# Patient Record
Sex: MALE | Race: WHITE | Employment: FULL TIME | URBAN - METROPOLITAN AREA
[De-identification: names, ages, dates, MRNs, and addresses within clinical notes are randomized per-mention and may not be internally consistent; named-entity substitution may affect disease eponyms.]

---

## 2017-01-25 DIAGNOSIS — E11.40 DIABETIC NEUROPATHY WITH NEUROLOGIC COMPLICATION (HCC): ICD-10-CM

## 2017-01-25 DIAGNOSIS — Z79.4 TYPE 2 DIABETES MELLITUS WITH DIABETIC POLYNEUROPATHY, WITH LONG-TERM CURRENT USE OF INSULIN (HCC): ICD-10-CM

## 2017-01-25 DIAGNOSIS — E11.42 TYPE 2 DIABETES MELLITUS WITH DIABETIC POLYNEUROPATHY, WITH LONG-TERM CURRENT USE OF INSULIN (HCC): ICD-10-CM

## 2017-01-25 DIAGNOSIS — E11.49 DIABETIC NEUROPATHY WITH NEUROLOGIC COMPLICATION (HCC): ICD-10-CM

## 2017-01-29 DIAGNOSIS — E11.40 DIABETIC NEUROPATHY WITH NEUROLOGIC COMPLICATION (HCC): ICD-10-CM

## 2017-01-29 DIAGNOSIS — E11.49 DIABETIC NEUROPATHY WITH NEUROLOGIC COMPLICATION (HCC): ICD-10-CM

## 2017-03-17 ENCOUNTER — PATIENT MESSAGE (OUTPATIENT)
Dept: FAMILY MEDICINE CLINIC | Age: 67
End: 2017-03-17

## 2017-03-17 DIAGNOSIS — I10 ESSENTIAL HYPERTENSION: ICD-10-CM

## 2017-03-17 DIAGNOSIS — E11.49 DIABETIC NEUROPATHY WITH NEUROLOGIC COMPLICATION (HCC): Primary | ICD-10-CM

## 2017-03-17 DIAGNOSIS — Z11.59 NEED FOR HEPATITIS C SCREENING TEST: ICD-10-CM

## 2017-03-17 DIAGNOSIS — E11.40 DIABETIC NEUROPATHY WITH NEUROLOGIC COMPLICATION (HCC): ICD-10-CM

## 2017-03-17 DIAGNOSIS — E11.42 TYPE 2 DIABETES MELLITUS WITH DIABETIC POLYNEUROPATHY, WITH LONG-TERM CURRENT USE OF INSULIN (HCC): ICD-10-CM

## 2017-03-17 DIAGNOSIS — G63 POLYNEUROPATHY ASSOCIATED WITH UNDERLYING DISEASE (HCC): ICD-10-CM

## 2017-03-17 DIAGNOSIS — Z79.4 TYPE 2 DIABETES MELLITUS WITH DIABETIC POLYNEUROPATHY, WITH LONG-TERM CURRENT USE OF INSULIN (HCC): ICD-10-CM

## 2017-03-17 DIAGNOSIS — E11.40 DIABETIC NEUROPATHY WITH NEUROLOGIC COMPLICATION (HCC): Primary | ICD-10-CM

## 2017-03-17 DIAGNOSIS — E11.49 DIABETIC NEUROPATHY WITH NEUROLOGIC COMPLICATION (HCC): ICD-10-CM

## 2017-03-17 DIAGNOSIS — E78.2 MIXED HYPERLIPIDEMIA: ICD-10-CM

## 2017-03-21 RX ORDER — DULOXETIN HYDROCHLORIDE 60 MG/1
CAPSULE, DELAYED RELEASE ORAL
Qty: 90 CAPSULE | Refills: 3 | Status: SHIPPED | OUTPATIENT
Start: 2017-03-21 | End: 2018-03-15 | Stop reason: SDUPTHER

## 2017-03-21 RX ORDER — PREGABALIN 100 MG/1
100 CAPSULE ORAL 3 TIMES DAILY
Qty: 270 CAPSULE | Refills: 1 | Status: SHIPPED | OUTPATIENT
Start: 2017-03-21 | End: 2017-09-23 | Stop reason: SDUPTHER

## 2017-03-22 ENCOUNTER — TELEPHONE (OUTPATIENT)
Dept: FAMILY MEDICINE CLINIC | Age: 67
End: 2017-03-22

## 2017-03-23 ENCOUNTER — TELEPHONE (OUTPATIENT)
Dept: FAMILY MEDICINE CLINIC | Age: 67
End: 2017-03-23

## 2017-03-23 DIAGNOSIS — E11.49 DIABETIC NEUROPATHY WITH NEUROLOGIC COMPLICATION (HCC): Primary | ICD-10-CM

## 2017-03-23 DIAGNOSIS — E11.40 DIABETIC NEUROPATHY WITH NEUROLOGIC COMPLICATION (HCC): Primary | ICD-10-CM

## 2017-03-25 RX ORDER — LANCETS 30 GAUGE
EACH MISCELLANEOUS
Qty: 100 EACH | Refills: 3 | Status: SHIPPED | OUTPATIENT
Start: 2017-03-25 | End: 2018-01-11

## 2017-03-25 RX ORDER — BLOOD-GLUCOSE METER
1 KIT MISCELLANEOUS DAILY
Qty: 1 KIT | Refills: 0 | Status: SHIPPED | OUTPATIENT
Start: 2017-03-25 | End: 2020-07-07 | Stop reason: SDUPTHER

## 2017-04-14 ENCOUNTER — HOSPITAL ENCOUNTER (OUTPATIENT)
Dept: LAB | Age: 67
Discharge: HOME | End: 2017-04-14
Payer: MEDICARE

## 2017-04-14 DIAGNOSIS — Z11.59 NEED FOR HEPATITIS C SCREENING TEST: ICD-10-CM

## 2017-04-14 DIAGNOSIS — E78.2 MIXED HYPERLIPIDEMIA: ICD-10-CM

## 2017-04-14 DIAGNOSIS — I10 ESSENTIAL HYPERTENSION: ICD-10-CM

## 2017-04-14 DIAGNOSIS — E11.40 DIABETIC NEUROPATHY WITH NEUROLOGIC COMPLICATION (HCC): ICD-10-CM

## 2017-04-14 DIAGNOSIS — E11.49 DIABETIC NEUROPATHY WITH NEUROLOGIC COMPLICATION (HCC): ICD-10-CM

## 2017-04-14 LAB
ACANTHOCYTES: 0
ALBUMIN SERPL-MCNC: 3.8 G/DL (ref 3.9–4.9)
ALP BLD-CCNC: 64 U/L (ref 35–104)
ALT SERPL-CCNC: 19 U/L (ref 0–41)
ANION GAP SERPL CALCULATED.3IONS-SCNC: 5 MEQ/L (ref 7–13)
ANISOCYTOSIS: 0
AST SERPL-CCNC: 18 U/L (ref 0–40)
AUER RODS: 0
BASOPHILIC STIPPLING: 0
BASOPHILS ABSOLUTE: 0.1 K/UL (ref 0–0.2)
BASOPHILS RELATIVE PERCENT: 3 %
BILIRUB SERPL-MCNC: 0.2 MG/DL (ref 0–1.2)
BUN BLDV-MCNC: 17 MG/DL (ref 8–23)
BURR CELLS: 0
CABOT RINGS: 0
CALCIUM SERPL-MCNC: 9 MG/DL (ref 8.6–10.2)
CHLORIDE BLD-SCNC: 105 MEQ/L (ref 98–107)
CHOLESTEROL, TOTAL: 105 MG/DL (ref 0–199)
CO2: 28 MEQ/L (ref 22–29)
CREAT SERPL-MCNC: 0.84 MG/DL (ref 0.7–1.2)
CREATININE URINE: 131.9 MG/DL
DOHLE BODIES: 0
EOSINOPHILS ABSOLUTE: 0.1 K/UL (ref 0–0.7)
EOSINOPHILS RELATIVE PERCENT: 4 %
GFR AFRICAN AMERICAN: >60
GFR NON-AFRICAN AMERICAN: >60
GLOBULIN: 2.2 G/DL (ref 2.3–3.5)
GLUCOSE BLD-MCNC: 117 MG/DL (ref 74–109)
HAIRY CELLS: 0
HBA1C MFR BLD: 7.7 % (ref 4.8–5.9)
HCT VFR BLD CALC: 35.9 % (ref 42–52)
HDLC SERPL-MCNC: 52 MG/DL (ref 40–59)
HEMOGLOBIN: 12.3 G/DL (ref 14–18)
HEPATITIS C ANTIBODY INTERPRETATION: NORMAL
HOWELL-JOLLY BODIES: 0
HYPERSEGMENTED NEUTROPHILS: 0
HYPOCHROMIA: 0
LDL CHOLESTEROL CALCULATED: 40 MG/DL (ref 0–129)
LYMPHOCYTES ABSOLUTE: 1 K/UL (ref 1–4.8)
LYMPHOCYTES RELATIVE PERCENT: 32 %
MACROCYTES: 0
MCH RBC QN AUTO: 30.9 PG (ref 27–31.3)
MCHC RBC AUTO-ENTMCNC: 34.3 % (ref 33–37)
MCV RBC AUTO: 90.1 FL (ref 80–100)
MICROALBUMIN UR-MCNC: 9 MG/DL
MICROALBUMIN/CREAT UR-RTO: 68.2 MG/G (ref 0–30)
MICROCYTES: 0
MONOCYTES ABSOLUTE: 0.5 K/UL (ref 0.2–0.8)
MONOCYTES RELATIVE PERCENT: 17.1 %
NEUTROPHILS ABSOLUTE: 1.4 K/UL (ref 1.4–6.5)
NEUTROPHILS RELATIVE PERCENT: 44 %
OVALOCYTES: 0
PAPPENHEIMER BODIES: 0
PDW BLD-RTO: 12.8 % (ref 11.5–14.5)
PELGER HUET CELLS: 0 %
PLATELET # BLD: 107 K/UL (ref 130–400)
PLATELET SLIDE REVIEW: ABNORMAL
POIKILOCYTES: 0
POLYCHROMASIA: 0
POTASSIUM SERPL-SCNC: 4.7 MEQ/L (ref 3.5–5.1)
RBC # BLD: 3.98 M/UL (ref 4.7–6.1)
RBC # BLD: NORMAL 10*6/UL
SCHISTOCYTES: 0
SICKLE CELLS: 0
SODIUM BLD-SCNC: 138 MEQ/L (ref 132–144)
SPHEROCYTES: 0
STOMATOCYTES: 0
TARGET CELLS: 0
TEAR DROP CELLS: 0
TOTAL PROTEIN: 6 G/DL (ref 6.4–8.1)
TOXIC GRANULATION: 0
TRIGL SERPL-MCNC: 65 MG/DL (ref 0–200)
TSH SERPL DL<=0.05 MIU/L-ACNC: 3.46 UIU/ML (ref 0.27–4.2)
VACUOLATED NEUTROPHILS: 0
WBC # BLD: 3.1 K/UL (ref 4.8–10.8)

## 2017-04-14 PROCEDURE — 82043 UR ALBUMIN QUANTITATIVE: CPT

## 2017-04-14 PROCEDURE — 84443 ASSAY THYROID STIM HORMONE: CPT

## 2017-04-14 PROCEDURE — 36415 COLL VENOUS BLD VENIPUNCTURE: CPT

## 2017-04-14 PROCEDURE — 85025 COMPLETE CBC W/AUTO DIFF WBC: CPT

## 2017-04-14 PROCEDURE — 83036 HEMOGLOBIN GLYCOSYLATED A1C: CPT

## 2017-04-14 PROCEDURE — 80061 LIPID PANEL: CPT

## 2017-04-14 PROCEDURE — 86803 HEPATITIS C AB TEST: CPT

## 2017-04-14 PROCEDURE — 80053 COMPREHEN METABOLIC PANEL: CPT

## 2017-04-14 PROCEDURE — 82570 ASSAY OF URINE CREATININE: CPT

## 2017-06-15 ENCOUNTER — TELEPHONE (OUTPATIENT)
Dept: ADMINISTRATIVE | Age: 67
End: 2017-06-15

## 2017-06-27 ENCOUNTER — TELEPHONE (OUTPATIENT)
Dept: PHARMACY | Facility: CLINIC | Age: 67
End: 2017-06-27

## 2017-10-31 RX ORDER — DIAPER,BRIEF,ADULT, DISPOSABLE
EACH MISCELLANEOUS
Qty: 200 EACH | Refills: 5 | Status: SHIPPED | OUTPATIENT
Start: 2017-10-31 | End: 2018-01-11 | Stop reason: SDUPTHER

## 2017-11-14 ENCOUNTER — OFFICE VISIT (OUTPATIENT)
Dept: FAMILY MEDICINE CLINIC | Age: 67
End: 2017-11-14

## 2017-11-14 VITALS
WEIGHT: 250 LBS | BODY MASS INDEX: 33.86 KG/M2 | HEIGHT: 72 IN | TEMPERATURE: 97.5 F | RESPIRATION RATE: 12 BRPM | SYSTOLIC BLOOD PRESSURE: 104 MMHG | DIASTOLIC BLOOD PRESSURE: 58 MMHG | HEART RATE: 54 BPM

## 2017-11-14 DIAGNOSIS — Z79.4 TYPE 2 DIABETES MELLITUS WITH DIABETIC POLYNEUROPATHY, WITH LONG-TERM CURRENT USE OF INSULIN (HCC): Primary | ICD-10-CM

## 2017-11-14 DIAGNOSIS — E78.2 MIXED HYPERLIPIDEMIA: ICD-10-CM

## 2017-11-14 DIAGNOSIS — I10 ESSENTIAL HYPERTENSION: ICD-10-CM

## 2017-11-14 DIAGNOSIS — Z23 NEED FOR INFLUENZA VACCINATION: ICD-10-CM

## 2017-11-14 DIAGNOSIS — N52.8 OTHER MALE ERECTILE DYSFUNCTION: ICD-10-CM

## 2017-11-14 DIAGNOSIS — Z23 NEED FOR 23-POLYVALENT PNEUMOCOCCAL POLYSACCHARIDE VACCINE: ICD-10-CM

## 2017-11-14 DIAGNOSIS — M25.522 LEFT ELBOW PAIN: ICD-10-CM

## 2017-11-14 DIAGNOSIS — E11.42 TYPE 2 DIABETES MELLITUS WITH DIABETIC POLYNEUROPATHY, WITH LONG-TERM CURRENT USE OF INSULIN (HCC): Primary | ICD-10-CM

## 2017-11-14 PROCEDURE — 90732 PPSV23 VACC 2 YRS+ SUBQ/IM: CPT | Performed by: FAMILY MEDICINE

## 2017-11-14 PROCEDURE — 90662 IIV NO PRSV INCREASED AG IM: CPT | Performed by: FAMILY MEDICINE

## 2017-11-14 PROCEDURE — G0008 ADMIN INFLUENZA VIRUS VAC: HCPCS | Performed by: FAMILY MEDICINE

## 2017-11-14 PROCEDURE — 99214 OFFICE O/P EST MOD 30 MIN: CPT | Performed by: FAMILY MEDICINE

## 2017-11-14 PROCEDURE — G0009 ADMIN PNEUMOCOCCAL VACCINE: HCPCS | Performed by: FAMILY MEDICINE

## 2017-11-14 RX ORDER — TADALAFIL 5 MG/1
5 TABLET ORAL DAILY
Qty: 90 TABLET | Refills: 3 | Status: SHIPPED | OUTPATIENT
Start: 2017-11-14 | End: 2018-09-20 | Stop reason: SDUPTHER

## 2017-11-14 RX ORDER — CELECOXIB 200 MG/1
200 CAPSULE ORAL DAILY
Qty: 90 CAPSULE | Refills: 3 | Status: SHIPPED | OUTPATIENT
Start: 2017-11-14 | End: 2018-09-20 | Stop reason: SDUPTHER

## 2017-11-14 RX ORDER — EZETIMIBE 10 MG/1
10 TABLET ORAL DAILY
COMMUNITY
End: 2017-11-14 | Stop reason: ALTCHOICE

## 2017-11-14 ASSESSMENT — ENCOUNTER SYMPTOMS
TROUBLE SWALLOWING: 0
CONSTIPATION: 0
CHEST TIGHTNESS: 0
NAUSEA: 0
SHORTNESS OF BREATH: 0
BLOOD IN STOOL: 0
DIARRHEA: 0
COUGH: 0
VOMITING: 0
ABDOMINAL PAIN: 0

## 2017-11-14 ASSESSMENT — PATIENT HEALTH QUESTIONNAIRE - PHQ9
1. LITTLE INTEREST OR PLEASURE IN DOING THINGS: 0
2. FEELING DOWN, DEPRESSED OR HOPELESS: 0
SUM OF ALL RESPONSES TO PHQ9 QUESTIONS 1 & 2: 0
SUM OF ALL RESPONSES TO PHQ QUESTIONS 1-9: 0

## 2017-11-14 NOTE — PROGRESS NOTES
Diabetes Mellitus Type 2: Current symptoms/problems include neuropathy. Home blood sugar records: patient tests 4 time(s) per day  Any episodes of hypoglycemia? Yes, occasionally. Known diabetic complications: none  Hypertension is well-controlled with current medicine and low-salt diet. Hyperlipidemia has been well controlled with current diet and medication. He has no side effects. Left elbow pain he has responded fairly well to the nonsteroidal anti-inflammatory. Erectile dysfunction responds well to C[place] Patient is due for influenza and Pneumovax vaccinations. Blood pressure less than 131/81,   BP Readings from Last 1 Encounters:   11/14/17 (!) 104/58     Social history: This pt is not a smoker. This pt does take an aspirin a day. Last eye exam was 01/2016. Last diabetic foot exam was TODAY. Lab results for chronic conditions:    GFR Non- (no units)   Date Value   04/14/2017 >60.0   09/23/2016 >60.0   03/17/2016 >60.0     No results found for: GFR  Cholesterol, Total (mg/dL)   Date Value   04/14/2017 105   09/23/2016 119   03/17/2016 119     Triglycerides (mg/dL)   Date Value   04/14/2017 65   09/23/2016 140   03/17/2016 126     HDL (mg/dL)   Date Value   04/14/2017 52   09/23/2016 48   03/17/2016 47     LDL Calculated (mg/dL)   Date Value   04/14/2017 40   09/23/2016 43   03/17/2016 47     TSH (uIU/mL)   Date Value   04/14/2017 3.460   02/20/2012 3.525     Hemoglobin A1C (%)   Date Value   04/14/2017 7.7 (H)   09/23/2016 7.2 (H)   03/17/2016 8.0 (H)     MICROALBUMIN, RANDOM URINE (mg/dL)   Date Value   04/14/2017 9.00 (H)     Review of Systems   HENT: Negative for congestion and trouble swallowing. Respiratory: Negative for cough, chest tightness and shortness of breath. Cardiovascular: Negative for chest pain, palpitations and leg swelling.    Gastrointestinal: Negative for abdominal pain, blood in stool, constipation, diarrhea, nausea and vomiting. Endocrine: Negative for cold intolerance and heat intolerance. Neurological: Negative for dizziness and light-headedness. Psychiatric/Behavioral: Negative for confusion. The patient is not nervous/anxious. Diabetes Counseling   Patient was counseled regarding disease risks and adopting healthy behaviors. Patient was provided education materials to assist with self management. Patient was provided log (or received log during previous visit) to record blood pressure, food intake and/or blood sugar. Patient was instructed to keep log up-to-date and to always bring log to all office visits. Insulin R and N 30 units in the morning and 40 of each at supper. EXAM:  Constitutional Blood pressure (!) 104/58, pulse 54, temperature 97.5 °F (36.4 °C), temperature source Temporal, resp. rate 12, height 5' 11.5\" (1.816 m), weight 250 lb (113.4 kg). .   Physical Exam   Constitutional: He appears well-developed and well-nourished. HENT:   Head: Normocephalic. Eyes: Pupils are equal, round, and reactive to light. Neck: Normal range of motion. Cardiovascular: Normal rate and regular rhythm. Pulmonary/Chest: Effort normal and breath sounds normal.   Abdominal: Soft. This patient is obese. Body mass index is 34.38 kg/m². Musculoskeletal:   There is no costovertebral angle tenderness. Lumbar spine and sacroiliac joints are non tender. Dorsalis pedis and posterior tibial pulses are symmetric. No fissures between the toes. No open sores on the feet. Sensation intact to monofilament testing in 8 of 8 areas tested. No edema in feet. Radial pulses strong and symmetric. No edema in hands or wrists. Skin: Skin is warm and dry. Psychiatric: He has a normal mood and affect. His behavior is normal.       DIAGNOSIS:   1.  Type 2 diabetes mellitus with diabetic polyneuropathy, with long-term current use of insulin (HCC)  Hemoglobin A1C     DIABETES FOOT EXAM    insulin

## 2017-11-16 DIAGNOSIS — E11.42 TYPE 2 DIABETES MELLITUS WITH DIABETIC POLYNEUROPATHY, WITH LONG-TERM CURRENT USE OF INSULIN (HCC): ICD-10-CM

## 2017-11-16 DIAGNOSIS — Z79.4 TYPE 2 DIABETES MELLITUS WITH DIABETIC POLYNEUROPATHY, WITH LONG-TERM CURRENT USE OF INSULIN (HCC): ICD-10-CM

## 2017-12-27 DIAGNOSIS — G63 POLYNEUROPATHY ASSOCIATED WITH UNDERLYING DISEASE (HCC): ICD-10-CM

## 2017-12-27 RX ORDER — PREGABALIN 100 MG/1
100 CAPSULE ORAL 3 TIMES DAILY
Qty: 270 CAPSULE | Refills: 0 | Status: SHIPPED | OUTPATIENT
Start: 2017-12-27 | End: 2018-03-15 | Stop reason: SDUPTHER

## 2017-12-27 NOTE — TELEPHONE ENCOUNTER
From: Gabriela Walker  Sent: 12/27/2017 9:22 AM EST  Subject: Medication Renewal Request    Isaías Flores would like a refill of the following medications:  LYRICA 100 MG capsule Alexys Robles MD]    Preferred pharmacy: [place] [address] [phone] - F [phone]    Comment:  Could you please send this to Luis E[facility] so I may get this filled under this years prescription plan.  Thank you, happy holidays, Isaías Olivera

## 2017-12-27 NOTE — TELEPHONE ENCOUNTER
Pharmacy requests refill on medication. Please approve or deny this request.  Last seen by you on 11/14/2017. LAST FILLED 9/24/2017    No future appointments.

## 2018-01-11 ENCOUNTER — TELEPHONE (OUTPATIENT)
Dept: FAMILY MEDICINE CLINIC | Age: 68
End: 2018-01-11

## 2018-01-11 DIAGNOSIS — E11.42 TYPE 2 DIABETES MELLITUS WITH DIABETIC POLYNEUROPATHY, WITH LONG-TERM CURRENT USE OF INSULIN (HCC): Primary | ICD-10-CM

## 2018-01-11 DIAGNOSIS — Z79.4 TYPE 2 DIABETES MELLITUS WITH DIABETIC POLYNEUROPATHY, WITH LONG-TERM CURRENT USE OF INSULIN (HCC): Primary | ICD-10-CM

## 2018-01-11 RX ORDER — LANCETS 30 GAUGE
EACH MISCELLANEOUS
Qty: 200 EACH | Refills: 5 | Status: SHIPPED | OUTPATIENT
Start: 2018-01-11

## 2018-01-11 NOTE — TELEPHONE ENCOUNTER
From: Elvin Lidas  Sent: 1/11/2018 11:19 AM EST  Subject: Medication Renewal Request    Colton May CARMELITA Diaz would like a refill of the following medications:  Kareem Salazar TEST strip Marilyn Hartman MD]    Preferred pharmacy: [facility] [id number] , [address]    Comment:  One Touch Meters & Test Strips Dr Luh Prince, I need a prescription for 2 meters, one for work and one for home as the insurance company will not fill my TrueTrack. They will only fill OneTouch and I would like 2 OneTouch Verio IQ Meters and test strips to work with those meters, 5 per day [employer] Strips. Thank You Huy Mcmahan [address] 14-

## 2018-01-11 NOTE — TELEPHONE ENCOUNTER
[facility] called as they received the RX for test strips. They also need an order for a monitor and lancets. Order pended. For 5 times daily testing the RX's will require a prior authorization. Do you want a PA done or change number of testing times per day?

## 2018-01-11 NOTE — TELEPHONE ENCOUNTER
PHARMACY requesting refill please approve or deny    Last OV 11-14-17    Next Visit Date:  No future appointments.

## 2018-02-21 ENCOUNTER — TELEPHONE (OUTPATIENT)
Dept: FAMILY MEDICINE CLINIC | Age: 68
End: 2018-02-21

## 2018-02-21 DIAGNOSIS — E11.42 TYPE 2 DIABETES MELLITUS WITH DIABETIC POLYNEUROPATHY, WITH LONG-TERM CURRENT USE OF INSULIN (HCC): ICD-10-CM

## 2018-02-21 DIAGNOSIS — Z79.4 TYPE 2 DIABETES MELLITUS WITH DIABETIC POLYNEUROPATHY, WITH LONG-TERM CURRENT USE OF INSULIN (HCC): ICD-10-CM

## 2018-03-13 ENCOUNTER — HOSPITAL ENCOUNTER (OUTPATIENT)
Dept: LAB | Age: 68
Discharge: HOME | End: 2018-03-13
Payer: MEDICARE

## 2018-03-13 DIAGNOSIS — Z79.4 TYPE 2 DIABETES MELLITUS WITH DIABETIC POLYNEUROPATHY, WITH LONG-TERM CURRENT USE OF INSULIN (HCC): ICD-10-CM

## 2018-03-13 DIAGNOSIS — E11.42 TYPE 2 DIABETES MELLITUS WITH DIABETIC POLYNEUROPATHY, WITH LONG-TERM CURRENT USE OF INSULIN (HCC): ICD-10-CM

## 2018-03-13 DIAGNOSIS — E78.2 MIXED HYPERLIPIDEMIA: ICD-10-CM

## 2018-03-13 DIAGNOSIS — I10 ESSENTIAL HYPERTENSION: ICD-10-CM

## 2018-03-13 LAB
ALBUMIN SERPL-MCNC: 4.1 G/DL (ref 3.9–4.9)
ALP BLD-CCNC: 75 U/L (ref 35–104)
ALT SERPL-CCNC: 23 U/L (ref 0–41)
ANION GAP SERPL CALCULATED.3IONS-SCNC: 10 MEQ/L (ref 7–13)
AST SERPL-CCNC: 18 U/L (ref 0–40)
BASOPHILS ABSOLUTE: 0 K/UL (ref 0–0.2)
BASOPHILS RELATIVE PERCENT: 1.1 %
BILIRUB SERPL-MCNC: 0.3 MG/DL (ref 0–1.2)
BUN BLDV-MCNC: 14 MG/DL (ref 8–23)
CALCIUM SERPL-MCNC: 9 MG/DL (ref 8.6–10.2)
CHLORIDE BLD-SCNC: 101 MEQ/L (ref 98–107)
CHOLESTEROL, TOTAL: 179 MG/DL (ref 0–199)
CO2: 28 MEQ/L (ref 22–29)
CREAT SERPL-MCNC: 0.84 MG/DL (ref 0.7–1.2)
EOSINOPHILS ABSOLUTE: 0.2 K/UL (ref 0–0.7)
EOSINOPHILS RELATIVE PERCENT: 3.9 %
GFR AFRICAN AMERICAN: >60
GFR NON-AFRICAN AMERICAN: >60
GLOBULIN: 2.4 G/DL (ref 2.3–3.5)
GLUCOSE BLD-MCNC: 159 MG/DL (ref 74–109)
HBA1C MFR BLD: 8.7 % (ref 4.8–5.9)
HCT VFR BLD CALC: 45.7 % (ref 42–52)
HDLC SERPL-MCNC: 48 MG/DL (ref 40–59)
HEMOGLOBIN: 15 G/DL (ref 14–18)
LDL CHOLESTEROL CALCULATED: 93 MG/DL (ref 0–129)
LYMPHOCYTES ABSOLUTE: 1.1 K/UL (ref 1–4.8)
LYMPHOCYTES RELATIVE PERCENT: 29.1 %
MCH RBC QN AUTO: 30.9 PG (ref 27–31.3)
MCHC RBC AUTO-ENTMCNC: 32.8 % (ref 33–37)
MCV RBC AUTO: 94.2 FL (ref 80–100)
MONOCYTES ABSOLUTE: 0.4 K/UL (ref 0.2–0.8)
MONOCYTES RELATIVE PERCENT: 9.1 %
NEUTROPHILS ABSOLUTE: 2.2 K/UL (ref 1.4–6.5)
NEUTROPHILS RELATIVE PERCENT: 56.8 %
PDW BLD-RTO: 14.9 % (ref 11.5–14.5)
PLATELET # BLD: 128 K/UL (ref 130–400)
POTASSIUM SERPL-SCNC: 4.4 MEQ/L (ref 3.5–5.1)
RBC # BLD: 4.85 M/UL (ref 4.7–6.1)
SLIDE REVIEW: ABNORMAL
SODIUM BLD-SCNC: 139 MEQ/L (ref 132–144)
TOTAL PROTEIN: 6.5 G/DL (ref 6.4–8.1)
TRIGL SERPL-MCNC: 189 MG/DL (ref 0–200)
WBC # BLD: 3.9 K/UL (ref 4.8–10.8)

## 2018-03-13 PROCEDURE — 80061 LIPID PANEL: CPT

## 2018-03-13 PROCEDURE — 85025 COMPLETE CBC W/AUTO DIFF WBC: CPT

## 2018-03-13 PROCEDURE — 36415 COLL VENOUS BLD VENIPUNCTURE: CPT

## 2018-03-13 PROCEDURE — 80053 COMPREHEN METABOLIC PANEL: CPT

## 2018-03-13 PROCEDURE — 83036 HEMOGLOBIN GLYCOSYLATED A1C: CPT

## 2018-03-15 DIAGNOSIS — G63 POLYNEUROPATHY ASSOCIATED WITH UNDERLYING DISEASE (HCC): ICD-10-CM

## 2018-03-15 RX ORDER — DULOXETIN HYDROCHLORIDE 60 MG/1
CAPSULE, DELAYED RELEASE ORAL
Qty: 90 CAPSULE | Refills: 3 | Status: SHIPPED | OUTPATIENT
Start: 2018-03-15 | End: 2018-09-20 | Stop reason: SDUPTHER

## 2018-03-15 RX ORDER — PREGABALIN 100 MG/1
100 CAPSULE ORAL 3 TIMES DAILY
Qty: 270 CAPSULE | Refills: 0 | Status: SHIPPED | OUTPATIENT
Start: 2018-03-15 | End: 2018-06-20 | Stop reason: SDUPTHER

## 2018-03-15 NOTE — TELEPHONE ENCOUNTER
From: Eugene Harper  Sent: 3/15/2018 10:46 AM EDT  Subject: Medication Renewal Request    Peter Price. Jasmintyolr Navarro would like a refill of the following medications:     DULoxetine (CYMBALTA) 60 MG extended release capsule Meet Osman MD]     pregabalin (LYRICA) 100 MG capsule Meet Osman MD]    Preferred pharmacy: [facility]    Comment:  Please send to [facility].  Thanks, Dave Gar

## 2018-06-20 ENCOUNTER — OFFICE VISIT (OUTPATIENT)
Dept: FAMILY MEDICINE CLINIC | Age: 68
End: 2018-06-20
Payer: MEDICARE

## 2018-06-20 VITALS
DIASTOLIC BLOOD PRESSURE: 62 MMHG | RESPIRATION RATE: 16 BRPM | WEIGHT: 249 LBS | BODY MASS INDEX: 33.72 KG/M2 | SYSTOLIC BLOOD PRESSURE: 104 MMHG | HEART RATE: 68 BPM | HEIGHT: 72 IN | TEMPERATURE: 98 F

## 2018-06-20 DIAGNOSIS — G63 POLYNEUROPATHY ASSOCIATED WITH UNDERLYING DISEASE (HCC): ICD-10-CM

## 2018-06-20 DIAGNOSIS — E11.42 TYPE 2 DIABETES MELLITUS WITH DIABETIC POLYNEUROPATHY, WITH LONG-TERM CURRENT USE OF INSULIN (HCC): ICD-10-CM

## 2018-06-20 DIAGNOSIS — Z79.4 TYPE 2 DIABETES MELLITUS WITH DIABETIC POLYNEUROPATHY, WITH LONG-TERM CURRENT USE OF INSULIN (HCC): ICD-10-CM

## 2018-06-20 DIAGNOSIS — E66.09 CLASS 1 OBESITY DUE TO EXCESS CALORIES WITH SERIOUS COMORBIDITY AND BODY MASS INDEX (BMI) OF 34.0 TO 34.9 IN ADULT: ICD-10-CM

## 2018-06-20 DIAGNOSIS — Z79.4 TYPE 2 DIABETES MELLITUS WITH DIABETIC POLYNEUROPATHY, WITH LONG-TERM CURRENT USE OF INSULIN (HCC): Primary | ICD-10-CM

## 2018-06-20 DIAGNOSIS — E11.42 TYPE 2 DIABETES MELLITUS WITH DIABETIC POLYNEUROPATHY, WITH LONG-TERM CURRENT USE OF INSULIN (HCC): Primary | ICD-10-CM

## 2018-06-20 PROBLEM — Z83.518 FAMILY HISTORY OF MACULAR DEGENERATION: Status: ACTIVE | Noted: 2018-02-21

## 2018-06-20 PROBLEM — H52.4 HYPEROPIA OF BOTH EYES WITH ASTIGMATISM AND PRESBYOPIA: Status: ACTIVE | Noted: 2017-01-06

## 2018-06-20 PROBLEM — H52.03 HYPEROPIA OF BOTH EYES WITH ASTIGMATISM AND PRESBYOPIA: Status: ACTIVE | Noted: 2017-01-06

## 2018-06-20 PROBLEM — H25.11 NUCLEAR SCLEROTIC CATARACT OF RIGHT EYE: Status: ACTIVE | Noted: 2017-01-06

## 2018-06-20 PROBLEM — H52.203 HYPEROPIA OF BOTH EYES WITH ASTIGMATISM AND PRESBYOPIA: Status: ACTIVE | Noted: 2017-01-06

## 2018-06-20 LAB
CREATININE URINE: 92.6 MG/DL
HBA1C MFR BLD: 7.3 %
MICROALBUMIN UR-MCNC: 5.1 MG/DL
MICROALBUMIN/CREAT UR-RTO: 55.1 MG/G (ref 0–30)

## 2018-06-20 PROCEDURE — 99214 OFFICE O/P EST MOD 30 MIN: CPT | Performed by: NURSE PRACTITIONER

## 2018-06-20 PROCEDURE — 83036 HEMOGLOBIN GLYCOSYLATED A1C: CPT | Performed by: NURSE PRACTITIONER

## 2018-06-20 RX ORDER — ICOSAPENT ETHYL 1000 MG/1
CAPSULE ORAL
COMMUNITY
Start: 2018-03-21 | End: 2019-03-21

## 2018-06-20 RX ORDER — EZETIMIBE 10 MG/1
10 TABLET ORAL DAILY
COMMUNITY

## 2018-06-20 RX ORDER — PREGABALIN 100 MG/1
100 CAPSULE ORAL 3 TIMES DAILY
Qty: 90 CAPSULE | Refills: 0 | Status: SHIPPED | OUTPATIENT
Start: 2018-06-20 | End: 2018-07-17 | Stop reason: SDUPTHER

## 2018-06-20 RX ORDER — METFORMIN HYDROCHLORIDE 500 MG/1
500 TABLET, EXTENDED RELEASE ORAL 2 TIMES DAILY
Qty: 60 TABLET | Refills: 3 | Status: SHIPPED | OUTPATIENT
Start: 2018-06-20 | End: 2018-09-20

## 2018-06-27 PROBLEM — G63 POLYNEUROPATHY ASSOCIATED WITH UNDERLYING DISEASE (HCC): Status: ACTIVE | Noted: 2018-06-27

## 2018-06-27 ASSESSMENT — ENCOUNTER SYMPTOMS
COUGH: 0
VISUAL CHANGE: 0
BLURRED VISION: 0
WHEEZING: 0
CHEST TIGHTNESS: 0
SHORTNESS OF BREATH: 0
BLOOD IN STOOL: 0

## 2018-07-17 DIAGNOSIS — G63 POLYNEUROPATHY ASSOCIATED WITH UNDERLYING DISEASE (HCC): ICD-10-CM

## 2018-07-17 NOTE — TELEPHONE ENCOUNTER
Pt requests refill on medication.  Please approve or deny this request.    LOV 11/14/17 by PADMA  6/20/18 for Check up with Sami Clarke    Last refill 6/20/18    Future Appointments  Date Time Provider Amado Aceves   9/20/2018 9:15 AM Navjot Chow MD [address] [facility]

## 2018-07-24 RX ORDER — PREGABALIN 100 MG/1
100 CAPSULE ORAL 3 TIMES DAILY
Qty: 90 CAPSULE | Refills: 2 | Status: SHIPPED | OUTPATIENT
Start: 2018-07-24 | End: 2018-09-20 | Stop reason: SDUPTHER

## 2018-09-13 RX ORDER — BLOOD SUGAR DIAGNOSTIC
STRIP MISCELLANEOUS
Qty: 200 STRIP | Refills: 5 | Status: SHIPPED | OUTPATIENT
Start: 2018-09-13 | End: 2019-03-21 | Stop reason: SDUPTHER

## 2018-09-13 NOTE — TELEPHONE ENCOUNTER
Pharmacy requesting refill. Medication pended for approval/denial. Thank you.     LOV  06/20/2018 with [place]    NEXT APPT:  Future Appointments  Date Time Provider Amado Aceves   9/20/2018 9:15 AM Eugenia Lozada MD [address]

## 2018-09-20 ENCOUNTER — OFFICE VISIT (OUTPATIENT)
Dept: FAMILY MEDICINE CLINIC | Age: 68
End: 2018-09-20
Payer: MEDICARE

## 2018-09-20 VITALS
RESPIRATION RATE: 14 BRPM | HEART RATE: 61 BPM | OXYGEN SATURATION: 96 % | HEIGHT: 72 IN | BODY MASS INDEX: 34.32 KG/M2 | DIASTOLIC BLOOD PRESSURE: 62 MMHG | WEIGHT: 253.4 LBS | TEMPERATURE: 97.5 F | SYSTOLIC BLOOD PRESSURE: 104 MMHG

## 2018-09-20 DIAGNOSIS — E78.2 MIXED HYPERLIPIDEMIA: ICD-10-CM

## 2018-09-20 DIAGNOSIS — G63 POLYNEUROPATHY ASSOCIATED WITH UNDERLYING DISEASE (HCC): ICD-10-CM

## 2018-09-20 DIAGNOSIS — Z12.5 SPECIAL SCREENING FOR MALIGNANT NEOPLASM OF PROSTATE: ICD-10-CM

## 2018-09-20 DIAGNOSIS — Z23 NEED FOR INFLUENZA VACCINATION: ICD-10-CM

## 2018-09-20 DIAGNOSIS — E11.42 TYPE 2 DIABETES MELLITUS WITH DIABETIC POLYNEUROPATHY, WITH LONG-TERM CURRENT USE OF INSULIN (HCC): ICD-10-CM

## 2018-09-20 DIAGNOSIS — I10 ESSENTIAL HYPERTENSION: ICD-10-CM

## 2018-09-20 DIAGNOSIS — Z79.4 TYPE 2 DIABETES MELLITUS WITH DIABETIC POLYNEUROPATHY, WITH LONG-TERM CURRENT USE OF INSULIN (HCC): Primary | ICD-10-CM

## 2018-09-20 DIAGNOSIS — D61.818 PANCYTOPENIA (HCC): ICD-10-CM

## 2018-09-20 DIAGNOSIS — Z79.4 TYPE 2 DIABETES MELLITUS WITH DIABETIC POLYNEUROPATHY, WITH LONG-TERM CURRENT USE OF INSULIN (HCC): ICD-10-CM

## 2018-09-20 DIAGNOSIS — E11.42 TYPE 2 DIABETES MELLITUS WITH DIABETIC POLYNEUROPATHY, WITH LONG-TERM CURRENT USE OF INSULIN (HCC): Primary | ICD-10-CM

## 2018-09-20 DIAGNOSIS — N52.8 OTHER MALE ERECTILE DYSFUNCTION: ICD-10-CM

## 2018-09-20 DIAGNOSIS — M25.522 LEFT ELBOW PAIN: ICD-10-CM

## 2018-09-20 PROBLEM — Z83.518 FAMILY HISTORY OF MACULAR DEGENERATION: Status: RESOLVED | Noted: 2018-02-21 | Resolved: 2018-09-20

## 2018-09-20 LAB
ALBUMIN SERPL-MCNC: 4.1 G/DL (ref 3.9–4.9)
ALP BLD-CCNC: 71 U/L (ref 35–104)
ALT SERPL-CCNC: 29 U/L (ref 0–41)
ANION GAP SERPL CALCULATED.3IONS-SCNC: 14 MEQ/L (ref 7–13)
AST SERPL-CCNC: 27 U/L (ref 0–40)
BASOPHILS ABSOLUTE: 0 K/UL (ref 0–0.2)
BASOPHILS RELATIVE PERCENT: 0.6 %
BILIRUB SERPL-MCNC: 0.5 MG/DL (ref 0–1.2)
BUN BLDV-MCNC: 17 MG/DL (ref 8–23)
CALCIUM SERPL-MCNC: 9 MG/DL (ref 8.6–10.2)
CHLORIDE BLD-SCNC: 101 MEQ/L (ref 98–107)
CHOLESTEROL, TOTAL: 111 MG/DL (ref 0–199)
CO2: 24 MEQ/L (ref 22–29)
CREAT SERPL-MCNC: 0.83 MG/DL (ref 0.7–1.2)
EOSINOPHILS ABSOLUTE: 0.2 K/UL (ref 0–0.7)
EOSINOPHILS RELATIVE PERCENT: 4.5 %
GFR AFRICAN AMERICAN: >60
GFR NON-AFRICAN AMERICAN: >60
GLOBULIN: 2.5 G/DL (ref 2.3–3.5)
GLUCOSE BLD-MCNC: 147 MG/DL (ref 74–109)
HBA1C MFR BLD: 8.4 % (ref 4.8–5.9)
HCT VFR BLD CALC: 42.2 % (ref 42–52)
HDLC SERPL-MCNC: 47 MG/DL (ref 40–59)
HEMOGLOBIN: 14.6 G/DL (ref 14–18)
LDL CHOLESTEROL CALCULATED: 44 MG/DL (ref 0–129)
LYMPHOCYTES ABSOLUTE: 1.1 K/UL (ref 1–4.8)
LYMPHOCYTES RELATIVE PERCENT: 27.5 %
MCH RBC QN AUTO: 33.1 PG (ref 27–31.3)
MCHC RBC AUTO-ENTMCNC: 34.6 % (ref 33–37)
MCV RBC AUTO: 95.7 FL (ref 80–100)
MONOCYTES ABSOLUTE: 0.3 K/UL (ref 0.2–0.8)
MONOCYTES RELATIVE PERCENT: 8 %
NEUTROPHILS ABSOLUTE: 2.3 K/UL (ref 1.4–6.5)
NEUTROPHILS RELATIVE PERCENT: 59.4 %
PDW BLD-RTO: 14 % (ref 11.5–14.5)
PLATELET # BLD: 120 K/UL (ref 130–400)
POTASSIUM SERPL-SCNC: 4.6 MEQ/L (ref 3.5–5.1)
PROSTATE SPECIFIC ANTIGEN: 2.53 NG/ML (ref 0–6.22)
RBC # BLD: 4.41 M/UL (ref 4.7–6.1)
SLIDE REVIEW: ABNORMAL
SODIUM BLD-SCNC: 139 MEQ/L (ref 132–144)
TOTAL PROTEIN: 6.6 G/DL (ref 6.4–8.1)
TRIGL SERPL-MCNC: 100 MG/DL (ref 0–200)
WBC # BLD: 3.8 K/UL (ref 4.8–10.8)

## 2018-09-20 PROCEDURE — 90662 IIV NO PRSV INCREASED AG IM: CPT | Performed by: FAMILY MEDICINE

## 2018-09-20 PROCEDURE — G8510 SCR DEP NEG, NO PLAN REQD: HCPCS | Performed by: FAMILY MEDICINE

## 2018-09-20 PROCEDURE — G0008 ADMIN INFLUENZA VIRUS VAC: HCPCS | Performed by: FAMILY MEDICINE

## 2018-09-20 PROCEDURE — 3288F FALL RISK ASSESSMENT DOCD: CPT | Performed by: FAMILY MEDICINE

## 2018-09-20 PROCEDURE — 99214 OFFICE O/P EST MOD 30 MIN: CPT | Performed by: FAMILY MEDICINE

## 2018-09-20 RX ORDER — CELECOXIB 200 MG/1
200 CAPSULE ORAL DAILY
Qty: 90 CAPSULE | Refills: 3 | Status: SHIPPED | OUTPATIENT
Start: 2018-09-20 | End: 2019-09-05 | Stop reason: SDUPTHER

## 2018-09-20 RX ORDER — PREGABALIN 100 MG/1
100 CAPSULE ORAL 3 TIMES DAILY
Qty: 90 CAPSULE | Refills: 2 | Status: SHIPPED | OUTPATIENT
Start: 2018-09-20 | End: 2018-12-20 | Stop reason: SDUPTHER

## 2018-09-20 RX ORDER — TADALAFIL 5 MG/1
5 TABLET ORAL DAILY
Qty: 90 TABLET | Refills: 3 | Status: SHIPPED | OUTPATIENT
Start: 2018-09-20 | End: 2019-12-05 | Stop reason: SDUPTHER

## 2018-09-20 RX ORDER — DULOXETIN HYDROCHLORIDE 60 MG/1
CAPSULE, DELAYED RELEASE ORAL
Qty: 90 CAPSULE | Refills: 3 | Status: SHIPPED | OUTPATIENT
Start: 2018-09-20 | End: 2019-12-17 | Stop reason: SDUPTHER

## 2018-09-20 ASSESSMENT — ENCOUNTER SYMPTOMS
SHORTNESS OF BREATH: 0
COUGH: 0
ABDOMINAL PAIN: 0
DIARRHEA: 0
CONSTIPATION: 0
BLOOD IN STOOL: 0
TROUBLE SWALLOWING: 0
CHEST TIGHTNESS: 0
VOMITING: 0
NAUSEA: 0

## 2018-09-20 ASSESSMENT — PATIENT HEALTH QUESTIONNAIRE - PHQ9
2. FEELING DOWN, DEPRESSED OR HOPELESS: 0
SUM OF ALL RESPONSES TO PHQ QUESTIONS 1-9: 0
SUM OF ALL RESPONSES TO PHQ QUESTIONS 1-9: 0
SUM OF ALL RESPONSES TO PHQ9 QUESTIONS 1 & 2: 0
1. LITTLE INTEREST OR PLEASURE IN DOING THINGS: 0

## 2018-09-20 NOTE — PROGRESS NOTES
Vaccine Information Sheet, \"Influenza - Inactivated\" OR \"Live - Intranasal\"  given to [employer]. Patient responses:    Have you ever had a reaction to a flu vaccine? No  Are you able to eat eggs without adverse effects? Yes  Do you have any current illness? No  Have you ever had Guillian Newington Syndrome? No    Flu vaccine given per order. Please see immunization tab.
([facility] [address]  metFORMIN (GLUCOPHAGE) 1000 MG tablet    Hemoglobin A1C    Well controlled, continue current medication. 2. Essential hypertension  Comprehensive Metabolic Panel    Well controlled, continue current treatment. 3. Mixed hyperlipidemia  Lipid Panel    Well controlled, continue current treatment. 4. Polyneuropathy associated with underlying disease ([facility] [address]  pregabalin (LYRICA) 100 MG capsule    DULoxetine (CYMBALTA) 60 MG extended release capsule    Well controlled, continue current medications. 5. Pancytopenia (HCC)  CBC Auto Differential    Improving, platelet count only thing low last time, recheck status. 6.  erectile dysfunction  tadalafil (CIALIS) 5 MG tablet    Well controlled, continue current medication. 7. Left elbow pain  celecoxib (CELEBREX) 200 MG capsule    symptomatic, likely tendonitis add NSAID   8. Special screening for malignant neoplasm of prostate  Psa screening    Laboratory orders for next visit placed. 9. Need for influenza vaccination  INFLUENZA, HIGH DOSE, 65 YRS +, IM, PF, PREFILL SYR, 0.5ML (FLUZONE HD)    Immunize patient today. Plan for follow up: Follow up in 3 months with blood work as ordered. Other follow up as needed.       Electronically signed by Jeffrey Rodriguez, 10:15 AM 9/20/18

## 2018-10-23 ENCOUNTER — OFFICE VISIT (OUTPATIENT)
Dept: FAMILY MEDICINE CLINIC | Age: 68
End: 2018-10-23
Payer: MEDICARE

## 2018-10-23 VITALS
BODY MASS INDEX: 33.83 KG/M2 | TEMPERATURE: 97.8 F | RESPIRATION RATE: 14 BRPM | DIASTOLIC BLOOD PRESSURE: 64 MMHG | SYSTOLIC BLOOD PRESSURE: 110 MMHG | HEART RATE: 98 BPM | WEIGHT: 246 LBS

## 2018-10-23 DIAGNOSIS — L03.115 CELLULITIS OF RIGHT LOWER EXTREMITY: Primary | ICD-10-CM

## 2018-10-23 PROCEDURE — 99213 OFFICE O/P EST LOW 20 MIN: CPT | Performed by: INTERNAL MEDICINE

## 2018-10-23 RX ORDER — CLINDAMYCIN HYDROCHLORIDE 300 MG/1
300 CAPSULE ORAL 4 TIMES DAILY
Qty: 28 CAPSULE | Refills: 0 | Status: SHIPPED | OUTPATIENT
Start: 2018-10-23 | End: 2018-10-30

## 2018-10-23 NOTE — PROGRESS NOTES
Subjective:      Patient ID: Michael Castelan is a 70 y.o. male    Right leg pain x 6 hours    HPI    Pt presents with 6 hours of sudden onset redness and soreness of the front of the right leg. This was noticed this morning upon attempting to put his socks on. Pain was rated 2/10, nonradiating, aggravated by touch and relieved with use of tylenol. No such episode on the left leg, no known insect bite, no hx malignancy, no blood stained sputum. No chest pain, hx of DVT/PE. Compliant with Xarelto for afib. Travelled to [place] a few weeks ago. Pt is very mobile (not sedentary) at work. Attests to sudden feverish feeling with chills 3 days ago, with no URTI. This improved after using Tylenol and remnant clindamycin from his dentist.       Past Medical History:   Diagnosis Date    Bell's palsy 2002    CAD (coronary artery disease)     Diabetes mellitus ([address]     insulin    ED (erectile dysfunction)     GERD (gastroesophageal reflux disease)     Herpes zoster 2000    Hyperlipidemia     Hypertension     Obesity     Palpitations     followed by cardiology    Pancytopenia 2008    mild    Peripheral neuropathy     followed neurology    Screening PSA (prostate specific antigen) 12/15/2010    1.36    Type II diabetes mellitus without complication, uncontrolled      Past Surgical History:   Procedure Laterality Date    CATARACT REMOVAL  12/18/13    LEFT EYE    COLONOSCOPY  2004    KNEE ARTHROSCOPY  12/2009    KNEE SURGERY      LIPOMA RESECTION  1987    scrotal     Social History     Social History    Marital status:      Spouse name: N/A    Number of children: N/A    Years of education: N/A     Occupational History    Not on file.      Social History Main Topics    Smoking status: Never Smoker    Smokeless tobacco: Never Used    Alcohol use 0.6 - 1.2 oz/week     1 - 2 Glasses of wine per week    Drug use: No    Sexual activity: Not on file     Other Topics Concern    Not on file

## 2018-10-24 ASSESSMENT — ENCOUNTER SYMPTOMS
BACK PAIN: 0
NAUSEA: 0
DIARRHEA: 0
SORE THROAT: 0
COLOR CHANGE: 1
ABDOMINAL PAIN: 0
SHORTNESS OF BREATH: 0
RHINORRHEA: 0
WHEEZING: 0
SINUS PRESSURE: 0
VOMITING: 0
SINUS PAIN: 0
COUGH: 0

## 2018-12-20 ENCOUNTER — OFFICE VISIT (OUTPATIENT)
Dept: FAMILY MEDICINE CLINIC | Age: 68
End: 2018-12-20
Payer: MEDICARE

## 2018-12-20 VITALS
DIASTOLIC BLOOD PRESSURE: 60 MMHG | HEART RATE: 67 BPM | SYSTOLIC BLOOD PRESSURE: 120 MMHG | HEIGHT: 72 IN | OXYGEN SATURATION: 95 % | BODY MASS INDEX: 32.8 KG/M2 | WEIGHT: 242.2 LBS | TEMPERATURE: 97 F | RESPIRATION RATE: 14 BRPM

## 2018-12-20 DIAGNOSIS — G63 POLYNEUROPATHY ASSOCIATED WITH UNDERLYING DISEASE (HCC): ICD-10-CM

## 2018-12-20 DIAGNOSIS — E78.2 MIXED HYPERLIPIDEMIA: ICD-10-CM

## 2018-12-20 DIAGNOSIS — D61.818 PANCYTOPENIA (HCC): ICD-10-CM

## 2018-12-20 DIAGNOSIS — N31.8 HYPERACTIVITY OF BLADDER: ICD-10-CM

## 2018-12-20 DIAGNOSIS — Z79.4 TYPE 2 DIABETES MELLITUS WITH DIABETIC POLYNEUROPATHY, WITH LONG-TERM CURRENT USE OF INSULIN (HCC): Primary | ICD-10-CM

## 2018-12-20 DIAGNOSIS — E11.42 TYPE 2 DIABETES MELLITUS WITH DIABETIC POLYNEUROPATHY, WITH LONG-TERM CURRENT USE OF INSULIN (HCC): Primary | ICD-10-CM

## 2018-12-20 DIAGNOSIS — I10 ESSENTIAL HYPERTENSION: ICD-10-CM

## 2018-12-20 LAB — HBA1C MFR BLD: 7.8 %

## 2018-12-20 PROCEDURE — 99214 OFFICE O/P EST MOD 30 MIN: CPT | Performed by: FAMILY MEDICINE

## 2018-12-20 PROCEDURE — 83036 HEMOGLOBIN GLYCOSYLATED A1C: CPT | Performed by: FAMILY MEDICINE

## 2018-12-20 RX ORDER — PREGABALIN 150 MG/1
150 CAPSULE ORAL 3 TIMES DAILY
Qty: 270 CAPSULE | Refills: 1 | Status: SHIPPED | OUTPATIENT
Start: 2018-12-20 | End: 2019-03-21 | Stop reason: SDUPTHER

## 2018-12-20 ASSESSMENT — PATIENT HEALTH QUESTIONNAIRE - PHQ9
SUM OF ALL RESPONSES TO PHQ9 QUESTIONS 1 & 2: 0
SUM OF ALL RESPONSES TO PHQ QUESTIONS 1-9: 0
1. LITTLE INTEREST OR PLEASURE IN DOING THINGS: 0
SUM OF ALL RESPONSES TO PHQ QUESTIONS 1-9: 0
2. FEELING DOWN, DEPRESSED OR HOPELESS: 0

## 2018-12-22 ASSESSMENT — ENCOUNTER SYMPTOMS
NAUSEA: 0
VOMITING: 0
CONSTIPATION: 0
ABDOMINAL PAIN: 0
CHEST TIGHTNESS: 0
DIARRHEA: 0
BLOOD IN STOOL: 0
COUGH: 0
SHORTNESS OF BREATH: 0

## 2019-01-10 PROBLEM — N31.8 HYPERACTIVITY OF BLADDER: Status: ACTIVE | Noted: 2019-01-10

## 2019-03-15 ENCOUNTER — HOSPITAL ENCOUNTER (OUTPATIENT)
Dept: LAB | Age: 69
Discharge: HOME | End: 2019-03-15
Payer: MEDICARE

## 2019-03-15 DIAGNOSIS — Z79.4 TYPE 2 DIABETES MELLITUS WITH DIABETIC POLYNEUROPATHY, WITH LONG-TERM CURRENT USE OF INSULIN (HCC): ICD-10-CM

## 2019-03-15 DIAGNOSIS — I10 ESSENTIAL HYPERTENSION: ICD-10-CM

## 2019-03-15 DIAGNOSIS — D61.818 PANCYTOPENIA (HCC): ICD-10-CM

## 2019-03-15 DIAGNOSIS — E11.42 TYPE 2 DIABETES MELLITUS WITH DIABETIC POLYNEUROPATHY, WITH LONG-TERM CURRENT USE OF INSULIN (HCC): ICD-10-CM

## 2019-03-15 DIAGNOSIS — E78.2 MIXED HYPERLIPIDEMIA: ICD-10-CM

## 2019-03-15 LAB
ALBUMIN SERPL-MCNC: 4 G/DL (ref 3.5–4.6)
ALP BLD-CCNC: 64 U/L (ref 35–104)
ALT SERPL-CCNC: 29 U/L (ref 0–41)
ANION GAP SERPL CALCULATED.3IONS-SCNC: 11 MEQ/L (ref 9–15)
AST SERPL-CCNC: 46 U/L (ref 0–40)
BASOPHILS ABSOLUTE: 0 K/UL (ref 0–0.2)
BASOPHILS RELATIVE PERCENT: 0.8 %
BILIRUB SERPL-MCNC: 0.3 MG/DL (ref 0.2–0.7)
BUN BLDV-MCNC: 18 MG/DL (ref 8–23)
CALCIUM SERPL-MCNC: 8.9 MG/DL (ref 8.5–9.9)
CHLORIDE BLD-SCNC: 106 MEQ/L (ref 95–107)
CHOLESTEROL, TOTAL: 115 MG/DL (ref 0–199)
CO2: 25 MEQ/L (ref 20–31)
CREAT SERPL-MCNC: 0.88 MG/DL (ref 0.7–1.2)
EOSINOPHILS ABSOLUTE: 0.2 K/UL (ref 0–0.7)
EOSINOPHILS RELATIVE PERCENT: 4.8 %
GFR AFRICAN AMERICAN: >60
GFR NON-AFRICAN AMERICAN: >60
GLOBULIN: 2.7 G/DL (ref 2.3–3.5)
GLUCOSE BLD-MCNC: 161 MG/DL (ref 70–99)
HBA1C MFR BLD: 8.4 % (ref 4.8–5.9)
HCT VFR BLD CALC: 41.7 % (ref 42–52)
HDLC SERPL-MCNC: 45 MG/DL (ref 40–59)
HEMOGLOBIN: 14.2 G/DL (ref 14–18)
LDL CHOLESTEROL CALCULATED: 53 MG/DL (ref 0–129)
LYMPHOCYTES ABSOLUTE: 1 K/UL (ref 1–4.8)
LYMPHOCYTES RELATIVE PERCENT: 28.9 %
MCH RBC QN AUTO: 32.1 PG (ref 27–31.3)
MCHC RBC AUTO-ENTMCNC: 34 % (ref 33–37)
MCV RBC AUTO: 94.2 FL (ref 80–100)
MONOCYTES ABSOLUTE: 0.3 K/UL (ref 0.2–0.8)
MONOCYTES RELATIVE PERCENT: 9.2 %
NEUTROPHILS ABSOLUTE: 2 K/UL (ref 1.4–6.5)
NEUTROPHILS RELATIVE PERCENT: 56.3 %
PDW BLD-RTO: 13.6 % (ref 11.5–14.5)
PLATELET # BLD: 145 K/UL (ref 130–400)
POTASSIUM SERPL-SCNC: 4.7 MEQ/L (ref 3.4–4.9)
RBC # BLD: 4.43 M/UL (ref 4.7–6.1)
SLIDE REVIEW: ABNORMAL
SODIUM BLD-SCNC: 142 MEQ/L (ref 135–144)
TOTAL PROTEIN: 6.7 G/DL (ref 6.3–8)
TRIGL SERPL-MCNC: 84 MG/DL (ref 0–150)
WBC # BLD: 3.6 K/UL (ref 4.8–10.8)

## 2019-03-15 PROCEDURE — 36415 COLL VENOUS BLD VENIPUNCTURE: CPT

## 2019-03-15 PROCEDURE — 83036 HEMOGLOBIN GLYCOSYLATED A1C: CPT

## 2019-03-15 PROCEDURE — 80061 LIPID PANEL: CPT

## 2019-03-15 PROCEDURE — 80053 COMPREHEN METABOLIC PANEL: CPT

## 2019-03-15 PROCEDURE — 85025 COMPLETE CBC W/AUTO DIFF WBC: CPT

## 2019-03-21 ENCOUNTER — OFFICE VISIT (OUTPATIENT)
Dept: FAMILY MEDICINE CLINIC | Age: 69
End: 2019-03-21
Payer: MEDICARE

## 2019-03-21 VITALS
BODY MASS INDEX: 34.24 KG/M2 | RESPIRATION RATE: 18 BRPM | WEIGHT: 244.6 LBS | DIASTOLIC BLOOD PRESSURE: 64 MMHG | SYSTOLIC BLOOD PRESSURE: 108 MMHG | HEART RATE: 74 BPM | HEIGHT: 71 IN | OXYGEN SATURATION: 98 % | TEMPERATURE: 96.9 F

## 2019-03-21 DIAGNOSIS — G63 POLYNEUROPATHY ASSOCIATED WITH UNDERLYING DISEASE (HCC): ICD-10-CM

## 2019-03-21 DIAGNOSIS — I10 ESSENTIAL HYPERTENSION: ICD-10-CM

## 2019-03-21 DIAGNOSIS — E78.2 MIXED HYPERLIPIDEMIA: ICD-10-CM

## 2019-03-21 DIAGNOSIS — E11.42 TYPE 2 DIABETES MELLITUS WITH DIABETIC POLYNEUROPATHY, WITH LONG-TERM CURRENT USE OF INSULIN (HCC): Primary | ICD-10-CM

## 2019-03-21 DIAGNOSIS — N31.8 HYPERACTIVITY OF BLADDER: ICD-10-CM

## 2019-03-21 DIAGNOSIS — Z79.4 TYPE 2 DIABETES MELLITUS WITH DIABETIC POLYNEUROPATHY, WITH LONG-TERM CURRENT USE OF INSULIN (HCC): Primary | ICD-10-CM

## 2019-03-21 PROCEDURE — 99214 OFFICE O/P EST MOD 30 MIN: CPT | Performed by: FAMILY MEDICINE

## 2019-03-21 RX ORDER — PREGABALIN 150 MG/1
150 CAPSULE ORAL 3 TIMES DAILY
Qty: 270 CAPSULE | Refills: 1 | Status: SHIPPED | OUTPATIENT
Start: 2019-03-21 | End: 2019-12-16 | Stop reason: SDUPTHER

## 2019-03-21 RX ORDER — ROSUVASTATIN CALCIUM 20 MG/1
20 TABLET, COATED ORAL EVERY EVENING
COMMUNITY
Start: 2013-12-11

## 2019-03-21 ASSESSMENT — PATIENT HEALTH QUESTIONNAIRE - PHQ9
SUM OF ALL RESPONSES TO PHQ9 QUESTIONS 1 & 2: 0
SUM OF ALL RESPONSES TO PHQ QUESTIONS 1-9: 0
2. FEELING DOWN, DEPRESSED OR HOPELESS: 0
SUM OF ALL RESPONSES TO PHQ QUESTIONS 1-9: 0
1. LITTLE INTEREST OR PLEASURE IN DOING THINGS: 0

## 2019-03-22 ASSESSMENT — ENCOUNTER SYMPTOMS
ABDOMINAL PAIN: 0
CONSTIPATION: 0
SHORTNESS OF BREATH: 0
DIARRHEA: 0
VOMITING: 0
BLOOD IN STOOL: 0
CHEST TIGHTNESS: 0
NAUSEA: 0
COUGH: 0

## 2019-09-05 ENCOUNTER — OFFICE VISIT (OUTPATIENT)
Dept: FAMILY MEDICINE CLINIC | Age: 69
End: 2019-09-05
Payer: MEDICARE

## 2019-09-05 VITALS
TEMPERATURE: 97 F | SYSTOLIC BLOOD PRESSURE: 116 MMHG | DIASTOLIC BLOOD PRESSURE: 70 MMHG | OXYGEN SATURATION: 99 % | RESPIRATION RATE: 16 BRPM | HEART RATE: 63 BPM | BODY MASS INDEX: 35.33 KG/M2 | WEIGHT: 246.8 LBS | HEIGHT: 70 IN

## 2019-09-05 DIAGNOSIS — M25.522 LEFT ELBOW PAIN: ICD-10-CM

## 2019-09-05 DIAGNOSIS — Z23 NEED FOR VACCINATION: ICD-10-CM

## 2019-09-05 DIAGNOSIS — Z79.4 TYPE 2 DIABETES MELLITUS WITH DIABETIC POLYNEUROPATHY, WITH LONG-TERM CURRENT USE OF INSULIN (HCC): Primary | ICD-10-CM

## 2019-09-05 DIAGNOSIS — I48.0 PAROXYSMAL ATRIAL FIBRILLATION (HCC): Chronic | ICD-10-CM

## 2019-09-05 DIAGNOSIS — D61.818 PANCYTOPENIA (HCC): ICD-10-CM

## 2019-09-05 DIAGNOSIS — B35.1 ONYCHOMYCOSIS: ICD-10-CM

## 2019-09-05 DIAGNOSIS — I25.10 CORONARY ARTERY DISEASE INVOLVING NATIVE CORONARY ARTERY OF NATIVE HEART WITHOUT ANGINA PECTORIS: ICD-10-CM

## 2019-09-05 DIAGNOSIS — E78.2 MIXED HYPERLIPIDEMIA: ICD-10-CM

## 2019-09-05 DIAGNOSIS — I10 ESSENTIAL HYPERTENSION: ICD-10-CM

## 2019-09-05 DIAGNOSIS — E11.42 TYPE 2 DIABETES MELLITUS WITH DIABETIC POLYNEUROPATHY, WITH LONG-TERM CURRENT USE OF INSULIN (HCC): Primary | ICD-10-CM

## 2019-09-05 DIAGNOSIS — K21.9 GASTROESOPHAGEAL REFLUX DISEASE, ESOPHAGITIS PRESENCE NOT SPECIFIED: ICD-10-CM

## 2019-09-05 PROCEDURE — 99215 OFFICE O/P EST HI 40 MIN: CPT | Performed by: FAMILY MEDICINE

## 2019-09-05 RX ORDER — POLYETHYLENE GLYCOL 3350 17 G/17G
17 POWDER, FOR SOLUTION ORAL DAILY
COMMUNITY

## 2019-09-05 RX ORDER — PREGABALIN 150 MG/1
150 CAPSULE ORAL 3 TIMES DAILY
Qty: 270 CAPSULE | Refills: 0 | Status: CANCELLED | OUTPATIENT
Start: 2019-09-05 | End: 2019-12-04

## 2019-09-05 RX ORDER — CELECOXIB 200 MG/1
200 CAPSULE ORAL DAILY
Qty: 90 CAPSULE | Refills: 1 | Status: SHIPPED | OUTPATIENT
Start: 2019-09-05 | End: 2019-12-05 | Stop reason: SDUPTHER

## 2019-09-05 SDOH — HEALTH STABILITY: MENTAL HEALTH: HOW MANY STANDARD DRINKS CONTAINING ALCOHOL DO YOU HAVE ON A TYPICAL DAY?: 1 OR 2

## 2019-09-05 SDOH — HEALTH STABILITY: MENTAL HEALTH: HOW OFTEN DO YOU HAVE A DRINK CONTAINING ALCOHOL?: 4 OR MORE TIMES A WEEK

## 2019-09-05 ASSESSMENT — ENCOUNTER SYMPTOMS
COUGH: 0
CHEST TIGHTNESS: 0
NAUSEA: 0
ABDOMINAL PAIN: 0
BLOOD IN STOOL: 0
DIARRHEA: 0
VOMITING: 0
SHORTNESS OF BREATH: 0
CONSTIPATION: 0
WHEEZING: 0
ANAL BLEEDING: 0
BACK PAIN: 1

## 2019-09-05 NOTE — PROGRESS NOTES
Subjective:      Patient ID: Bahman Ramirez is a 78 y.o. male who presents today for:     Chief Complaint   Patient presents with   [facility] today to transfer care from Dr. Lorin Xavier who is no longer a [id number] [place] [address] Physician       HPI     Patient presents to [facility] care. He was previously seen by Dr. Lorin Xavier, his most recent visit was 6 months ago. He has a known history of diabetes with diabetic polyneuropathy, hypertension, hyperlipidemia, coronary artery disease, GERD, and pancytopenia. He denies adhering to any specific lower carbohydrate or lower salt diet. He denies any routine exercise outside of his normal day-to-day activity. He denies any new onset polyuria or polydipsia, new onset vision changes, or new onset paresthesias. He denies any chest pain, dyspnea, palpitations, lightheadedness, dizziness, lower extremity edema, or syncope.     Past Medical History:   Diagnosis Date    Bell's palsy 2002    CAD (coronary artery disease)     ED (erectile dysfunction)     GERD (gastroesophageal reflux disease)     Herpes zoster 2000    Hyperactivity of bladder 1/10/2019    Hyperlipidemia     Hyperopia of both eyes with astigmatism and presbyopia 1/6/2017    Hypertension     Nuclear sclerotic cataract of right eye 1/6/2017    Obesity     Pancytopenia ([address] 2008    Paroxysmal atrial fibrillation ([address] 9/5/2019    Type 2 diabetes mellitus with diabetic polyneuropathy, with long-term current use of insulin ([address] 11/14/2017     Past Surgical History:   Procedure Laterality Date    CATARACT REMOVAL Left 12/18/2013    COLONOSCOPY  2004    DR GUADARRAMA    COLONOSCOPY  01/28/2016    diverticulosis, int hemorrhoids, 5y repeat (DR MANTILLA)    CYST REMOVAL  1987    testicular cyst    KNEE ARTHROSCOPY  12/2009    LIPOMA RESECTION  1987    scrotal    TONSILLECTOMY  1954    TOTAL KNEE ARTHROPLASTY Left 2013    DR OKEEFE     Family History   Problem Relation Age of Onset    Diabetes Mother    [facility] [employer] games, fishing, boating, vegetable garden, cooking     Current Outpatient Medications on File Prior to Visit   Medication Sig Dispense Refill    insulin lispro (HUMALOG) 100 UNIT/ML injection vial 80 units      Beclomethasone Diprop Monohyd (BECONASE AQ NA) by Nasal route      polyethylene glycol (GLYCOLAX) powder Take 17 g by mouth daily      rosuvastatin (CRESTOR) 20 MG tablet Take 20 mg by mouth      blood glucose test strips (ONETOUCH VERIO) strip USE ONE STRIP TO CHECK GLUCOSE FIVE TIMES DAILY AS NEEDED 200 strip 5    insulin NPH (NOVOLIN N) 100 UNIT/ML injection vial 30 units before breakfast, 40 units with supper, 15 units at bed time 8 vial 5    pregabalin (LYRICA) 150 MG capsule Take 1 capsule by mouth 3 times daily for 180 days. 270 capsule 1    Mirabegron ER 50 MG TB24 Take 50 mg by mouth daily 90 tablet 3    DULoxetine (CYMBALTA) 60 MG extended release capsule One by mouth daily 90 capsule 3    tadalafil (CIALIS) 5 MG tablet Take 1 tablet by mouth daily 90 tablet 3    IRBESARTAN PO Take 300 mg by mouth Take 1/2 tablet BID      insulin regular (NOVOLIN R) 100 UNIT/ML injection Inject SC tid per sliding scale up to 100 units daily prn .00 6 vial 3    ezetimibe (ZETIA) 10 MG tablet Take 10 mg by mouth daily      Blood Glucose Monitoring Suppl XU Test five times daily 1 Device 0    Lancets MISC Test five times daily. 200 each 5    aspirin 81 MG tablet Take 81 mg by mouth daily      Insulin Syringes, Disposable, U-100 1 ML MISC Use qid for dosing insulin as directed. Dx 250.00 400 each 3    glucose monitoring kit (FREESTYLE) monitoring kit 1 kit by Does not apply route daily Free style lite 1 kit 0    ULTICARE INSULIN SYRINGE 30G X 1/2\" 1 ML MISC USE 4 TIMES DAILY FOR DOSING INSULIN AS DIRECTED 300 each 3    XARELTO 20 MG TABS tablet       Docusate Sodium (COLACE PO) Take 300 mg by mouth.  metoprolol (TOPROL XL) 50 MG XL tablet Take 50 mg by mouth daily.          No 54:09 PM    Click here for a link to the UpToDate guideline \"Atrial Fibrillation: Anticoagulation therapy to prevent embolization    Disclaimer: Risk Score calculation is dependent on accuracy of patient problem list and past encounter diagnosis. Assessment & Plan:      1. Type 2 diabetes mellitus with diabetic polyneuropathy, with long-term current use of insulin ([facility]  I discussed with patient the need to establish care with endocrinology due to his uncontrolled diabetes with multiple forms of insulin. He has chronic ophthalmology follow-up for diabetic eye exams and I have referred him to podiatry to help with long-term foot care due to noted neuropathy along with calluses and onychomycosis. I stressed with him the importance of routine office follow-up visits and lab work monitoring.    - metFORMIN (GLUCOPHAGE) 1000 MG tablet; Take 1 tablet by mouth 2 times daily (with meals)  Dispense: 180 tablet; Refill: 1  - Comprehensive Metabolic Panel; Future  - Lipid Panel; Future  - Hemoglobin A1C; Future  - Microalbumin / Creatinine Urine Ratio; Future  - [facility], [facility] , Veronica Sanon DPM, Podiatry, [place]    2. Paroxysmal atrial fibrillation (HCC)  Stable. Asymptomatic and rate controlled. He is on anticoagulation without signs of bleeding. We will follow over time    3. Essential hypertension  Blood pressure within normal limits today in the office. Continue current medication    - Comprehensive Metabolic Panel; Future    4. Mixed hyperlipidemia    - Comprehensive Metabolic Panel; Future  - Lipid Panel; Future    5. Coronary artery disease involving native coronary artery of native heart without angina pectoris  Patient established with cardiology for long-term follow-up. Denies any acute change in activity tolerance. We reviewed the importance of tight blood glucose, lipid, and blood pressure control long term.     - Lipid Panel;

## 2019-09-11 LAB — DIABETIC RETINOPATHY: NORMAL

## 2019-09-24 ENCOUNTER — OFFICE VISIT (OUTPATIENT)
Dept: ENDOCRINOLOGY | Age: 69
End: 2019-09-24
Payer: MEDICARE

## 2019-09-24 VITALS
HEART RATE: 64 BPM | SYSTOLIC BLOOD PRESSURE: 100 MMHG | DIASTOLIC BLOOD PRESSURE: 63 MMHG | HEIGHT: 70 IN | WEIGHT: 248 LBS | BODY MASS INDEX: 35.5 KG/M2

## 2019-09-24 DIAGNOSIS — Z79.4 TYPE 2 DIABETES MELLITUS WITH DIABETIC POLYNEUROPATHY, WITH LONG-TERM CURRENT USE OF INSULIN (HCC): Primary | Chronic | ICD-10-CM

## 2019-09-24 DIAGNOSIS — E11.42 TYPE 2 DIABETES MELLITUS WITH DIABETIC POLYNEUROPATHY, WITH LONG-TERM CURRENT USE OF INSULIN (HCC): Primary | Chronic | ICD-10-CM

## 2019-09-24 LAB
CHP ED QC CHECK: NORMAL
GLUCOSE BLD-MCNC: 122 MG/DL
HBA1C MFR BLD: 7.1 %

## 2019-09-24 PROCEDURE — 83036 HEMOGLOBIN GLYCOSYLATED A1C: CPT | Performed by: INTERNAL MEDICINE

## 2019-09-24 PROCEDURE — 95250 CONT GLUC MNTR PHYS/QHP EQP: CPT | Performed by: INTERNAL MEDICINE

## 2019-09-24 PROCEDURE — 99203 OFFICE O/P NEW LOW 30 MIN: CPT | Performed by: INTERNAL MEDICINE

## 2019-09-24 RX ORDER — FLASH GLUCOSE SCANNING READER
EACH MISCELLANEOUS
Qty: 1 DEVICE | Refills: 0 | Status: SHIPPED | OUTPATIENT
Start: 2019-09-24 | End: 2020-05-04 | Stop reason: SDUPTHER

## 2019-09-24 RX ORDER — FLASH GLUCOSE SENSOR
KIT MISCELLANEOUS
Qty: 2 EACH | Refills: 6 | Status: SHIPPED | OUTPATIENT
Start: 2019-09-24 | End: 2020-05-04 | Stop reason: SDUPTHER

## 2019-09-24 NOTE — PROGRESS NOTES
, Rfl:     Beclomethasone Diprop Monohyd (BECONASE AQ NA), by Nasal route, Disp: , Rfl:     polyethylene glycol (GLYCOLAX) powder, Take 17 g by mouth daily, Disp: , Rfl:     celecoxib (CELEBREX) 200 MG capsule, Take 1 capsule by mouth daily, Disp: 90 capsule, Rfl: 1    metFORMIN (GLUCOPHAGE) 1000 MG tablet, Take 1 tablet by mouth 2 times daily (with meals), Disp: 180 tablet, Rfl: 1    rosuvastatin (CRESTOR) 20 MG tablet, Take 20 mg by mouth, Disp: , Rfl:     blood glucose test strips (ONETOUCH VERIO) strip, USE ONE STRIP TO CHECK GLUCOSE FIVE TIMES DAILY AS NEEDED, Disp: 200 strip, Rfl: 5    insulin NPH (NOVOLIN N) 100 UNIT/ML injection vial, 30 units before breakfast, 40 units with supper, 15 units at bed time, Disp: 8 vial, Rfl: 5    Mirabegron ER 50 MG TB24, Take 50 mg by mouth daily, Disp: 90 tablet, Rfl: 3    DULoxetine (CYMBALTA) 60 MG extended release capsule, One by mouth daily, Disp: 90 capsule, Rfl: 3    tadalafil (CIALIS) 5 MG tablet, Take 1 tablet by mouth daily, Disp: 90 tablet, Rfl: 3    IRBESARTAN PO, Take 300 mg by mouth Take 1/2 tablet BID, Disp: , Rfl:     insulin regular (NOVOLIN R) 100 UNIT/ML injection, Inject SC tid per sliding scale up to 100 units daily prn .00, Disp: 6 vial, Rfl: 3    ezetimibe (ZETIA) 10 MG tablet, Take 10 mg by mouth daily, Disp: , Rfl:     Blood Glucose Monitoring Suppl XU, Test five times daily, Disp: 1 Device, Rfl: 0    Lancets MISC, Test five times daily. , Disp: 200 each, Rfl: 5    aspirin 81 MG tablet, Take 81 mg by mouth daily, Disp: , Rfl:     Insulin Syringes, Disposable, U-100 1 ML MISC, Use qid for dosing insulin as directed.  Dx 250.00, Disp: 400 each, Rfl: 3    glucose monitoring kit (FREESTYLE) monitoring kit, 1 kit by Does not apply route daily Free style lite, Disp: 1 kit, Rfl: 0    ULTICARE INSULIN SYRINGE 30G X 1/2\" 1 ML MISC, USE 4 TIMES DAILY FOR DOSING INSULIN AS DIRECTED, Disp: 300 each, Rfl: 3    XARELTO 20 MG TABS tablet, ,

## 2019-10-01 ENCOUNTER — TELEPHONE (OUTPATIENT)
Dept: FAMILY MEDICINE CLINIC | Age: 69
End: 2019-10-01

## 2019-10-02 ENCOUNTER — OFFICE VISIT (OUTPATIENT)
Dept: FAMILY MEDICINE CLINIC | Age: 69
End: 2019-10-02
Payer: MEDICARE

## 2019-10-02 VITALS
DIASTOLIC BLOOD PRESSURE: 58 MMHG | BODY MASS INDEX: 35.1 KG/M2 | RESPIRATION RATE: 16 BRPM | WEIGHT: 245.2 LBS | HEART RATE: 74 BPM | OXYGEN SATURATION: 99 % | HEIGHT: 70 IN | SYSTOLIC BLOOD PRESSURE: 112 MMHG | TEMPERATURE: 97.4 F

## 2019-10-02 DIAGNOSIS — L03.115 CELLULITIS OF RIGHT LOWER EXTREMITY: Primary | ICD-10-CM

## 2019-10-02 PROCEDURE — 99213 OFFICE O/P EST LOW 20 MIN: CPT | Performed by: FAMILY MEDICINE

## 2019-10-02 RX ORDER — CLINDAMYCIN HYDROCHLORIDE 300 MG/1
300 CAPSULE ORAL 4 TIMES DAILY
Qty: 40 CAPSULE | Refills: 0 | Status: SHIPPED | OUTPATIENT
Start: 2019-10-02 | End: 2019-10-12

## 2019-10-02 ASSESSMENT — ENCOUNTER SYMPTOMS
SHORTNESS OF BREATH: 0
COLOR CHANGE: 1
ABDOMINAL PAIN: 0
DIARRHEA: 0
CHEST TIGHTNESS: 0
VOMITING: 0
NAUSEA: 0

## 2019-10-09 ENCOUNTER — TELEPHONE (OUTPATIENT)
Dept: ENDOCRINOLOGY | Age: 69
End: 2019-10-09

## 2019-10-09 ENCOUNTER — NURSE ONLY (OUTPATIENT)
Dept: ENDOCRINOLOGY | Age: 69
End: 2019-10-09
Payer: MEDICARE

## 2019-10-09 DIAGNOSIS — E11.42 TYPE 2 DIABETES MELLITUS WITH DIABETIC POLYNEUROPATHY, WITH LONG-TERM CURRENT USE OF INSULIN (HCC): Primary | Chronic | ICD-10-CM

## 2019-10-09 DIAGNOSIS — Z79.4 TYPE 2 DIABETES MELLITUS WITH DIABETIC POLYNEUROPATHY, WITH LONG-TERM CURRENT USE OF INSULIN (HCC): Primary | Chronic | ICD-10-CM

## 2019-10-09 PROCEDURE — 95251 CONT GLUC MNTR ANALYSIS I&R: CPT | Performed by: INTERNAL MEDICINE

## 2019-10-11 ENCOUNTER — HOSPITAL ENCOUNTER (OUTPATIENT)
Dept: LAB | Age: 69
Discharge: HOME | End: 2019-10-11
Payer: MEDICARE

## 2019-10-11 DIAGNOSIS — Z79.4 TYPE 2 DIABETES MELLITUS WITH DIABETIC POLYNEUROPATHY, WITH LONG-TERM CURRENT USE OF INSULIN (HCC): ICD-10-CM

## 2019-10-11 DIAGNOSIS — I25.10 CORONARY ARTERY DISEASE INVOLVING NATIVE CORONARY ARTERY OF NATIVE HEART WITHOUT ANGINA PECTORIS: ICD-10-CM

## 2019-10-11 DIAGNOSIS — E78.2 MIXED HYPERLIPIDEMIA: ICD-10-CM

## 2019-10-11 DIAGNOSIS — D61.818 PANCYTOPENIA (HCC): ICD-10-CM

## 2019-10-11 DIAGNOSIS — I10 ESSENTIAL HYPERTENSION: ICD-10-CM

## 2019-10-11 DIAGNOSIS — E11.42 TYPE 2 DIABETES MELLITUS WITH DIABETIC POLYNEUROPATHY, WITH LONG-TERM CURRENT USE OF INSULIN (HCC): ICD-10-CM

## 2019-10-11 LAB
ALBUMIN SERPL-MCNC: 4 G/DL (ref 3.5–4.6)
ALP BLD-CCNC: 61 U/L (ref 35–104)
ALT SERPL-CCNC: 26 U/L (ref 0–41)
ANION GAP SERPL CALCULATED.3IONS-SCNC: 10 MEQ/L (ref 9–15)
AST SERPL-CCNC: 23 U/L (ref 0–40)
BASOPHILS ABSOLUTE: 0.1 K/UL (ref 0–0.2)
BASOPHILS RELATIVE PERCENT: 1.2 %
BILIRUB SERPL-MCNC: <0.2 MG/DL (ref 0.2–0.7)
BUN BLDV-MCNC: 15 MG/DL (ref 8–23)
CALCIUM SERPL-MCNC: 9.5 MG/DL (ref 8.5–9.9)
CHLORIDE BLD-SCNC: 102 MEQ/L (ref 95–107)
CHOLESTEROL, TOTAL: 107 MG/DL (ref 0–199)
CO2: 28 MEQ/L (ref 20–31)
CREAT SERPL-MCNC: 0.78 MG/DL (ref 0.7–1.2)
CREATININE URINE: 109.3 MG/DL
EOSINOPHILS ABSOLUTE: 0.1 K/UL (ref 0–0.7)
EOSINOPHILS RELATIVE PERCENT: 3.3 %
GFR AFRICAN AMERICAN: >60
GFR NON-AFRICAN AMERICAN: >60
GLOBULIN: 2.8 G/DL (ref 2.3–3.5)
GLUCOSE BLD-MCNC: 85 MG/DL (ref 70–99)
HBA1C MFR BLD: 7.7 % (ref 4.8–5.9)
HCT VFR BLD CALC: 40.5 % (ref 42–52)
HDLC SERPL-MCNC: 44 MG/DL (ref 40–59)
HEMOGLOBIN: 13.5 G/DL (ref 14–18)
LDL CHOLESTEROL CALCULATED: 44 MG/DL (ref 0–129)
LYMPHOCYTES ABSOLUTE: 1.3 K/UL (ref 1–4.8)
LYMPHOCYTES RELATIVE PERCENT: 28.7 %
MCH RBC QN AUTO: 31.5 PG (ref 27–31.3)
MCHC RBC AUTO-ENTMCNC: 33.4 % (ref 33–37)
MCV RBC AUTO: 94.2 FL (ref 80–100)
MICROALBUMIN UR-MCNC: 5.6 MG/DL
MICROALBUMIN/CREAT UR-RTO: 51.2 MG/G (ref 0–30)
MONOCYTES ABSOLUTE: 0.4 K/UL (ref 0.2–0.8)
MONOCYTES RELATIVE PERCENT: 8.2 %
NEUTROPHILS ABSOLUTE: 2.6 K/UL (ref 1.4–6.5)
NEUTROPHILS RELATIVE PERCENT: 58.6 %
PDW BLD-RTO: 14 % (ref 11.5–14.5)
PLATELET # BLD: 222 K/UL (ref 130–400)
POTASSIUM SERPL-SCNC: 4.4 MEQ/L (ref 3.4–4.9)
RBC # BLD: 4.3 M/UL (ref 4.7–6.1)
SODIUM BLD-SCNC: 140 MEQ/L (ref 135–144)
TOTAL PROTEIN: 6.8 G/DL (ref 6.3–8)
TRIGL SERPL-MCNC: 97 MG/DL (ref 0–150)
WBC # BLD: 4.4 K/UL (ref 4.8–10.8)

## 2019-10-11 PROCEDURE — 80061 LIPID PANEL: CPT

## 2019-10-11 PROCEDURE — 80053 COMPREHEN METABOLIC PANEL: CPT

## 2019-10-11 PROCEDURE — 85025 COMPLETE CBC W/AUTO DIFF WBC: CPT

## 2019-10-11 PROCEDURE — 82570 ASSAY OF URINE CREATININE: CPT

## 2019-10-11 PROCEDURE — 36415 COLL VENOUS BLD VENIPUNCTURE: CPT

## 2019-10-11 PROCEDURE — 84681 ASSAY OF C-PEPTIDE: CPT

## 2019-10-11 PROCEDURE — 82043 UR ALBUMIN QUANTITATIVE: CPT

## 2019-10-11 PROCEDURE — 83036 HEMOGLOBIN GLYCOSYLATED A1C: CPT

## 2019-10-12 LAB — C-PEPTIDE: 0.4 NG/ML (ref 1.1–4.4)

## 2019-10-25 ENCOUNTER — OFFICE VISIT (OUTPATIENT)
Dept: ENDOCRINOLOGY | Age: 69
End: 2019-10-25
Payer: MEDICARE

## 2019-10-25 VITALS
WEIGHT: 250 LBS | HEART RATE: 52 BPM | HEIGHT: 70 IN | DIASTOLIC BLOOD PRESSURE: 87 MMHG | BODY MASS INDEX: 35.79 KG/M2 | SYSTOLIC BLOOD PRESSURE: 127 MMHG

## 2019-10-25 PROBLEM — E11.42 TYPE 2 DIABETES MELLITUS WITH DIABETIC POLYNEUROPATHY, WITH LONG-TERM CURRENT USE OF INSULIN (HCC): Chronic | Status: RESOLVED | Noted: 2017-11-14 | Resolved: 2019-10-25

## 2019-10-25 PROBLEM — Z79.4 TYPE 2 DIABETES MELLITUS WITH DIABETIC POLYNEUROPATHY, WITH LONG-TERM CURRENT USE OF INSULIN (HCC): Chronic | Status: RESOLVED | Noted: 2017-11-14 | Resolved: 2019-10-25

## 2019-10-25 LAB
CHP ED QC CHECK: NORMAL
GLUCOSE BLD-MCNC: 88 MG/DL

## 2019-10-25 PROCEDURE — 82962 GLUCOSE BLOOD TEST: CPT | Performed by: INTERNAL MEDICINE

## 2019-10-25 PROCEDURE — 99213 OFFICE O/P EST LOW 20 MIN: CPT | Performed by: INTERNAL MEDICINE

## 2019-12-05 ENCOUNTER — OFFICE VISIT (OUTPATIENT)
Dept: FAMILY MEDICINE CLINIC | Age: 69
End: 2019-12-05
Payer: MEDICARE

## 2019-12-05 VITALS
TEMPERATURE: 96.9 F | WEIGHT: 250 LBS | DIASTOLIC BLOOD PRESSURE: 78 MMHG | HEIGHT: 70 IN | HEART RATE: 62 BPM | BODY MASS INDEX: 35.79 KG/M2 | OXYGEN SATURATION: 98 % | SYSTOLIC BLOOD PRESSURE: 124 MMHG | RESPIRATION RATE: 16 BRPM

## 2019-12-05 DIAGNOSIS — Z91.81 AT HIGH RISK FOR FALLS: ICD-10-CM

## 2019-12-05 DIAGNOSIS — I10 ESSENTIAL HYPERTENSION: Chronic | ICD-10-CM

## 2019-12-05 DIAGNOSIS — R80.9 MICROALBUMINURIA DUE TO TYPE 1 DIABETES MELLITUS (HCC): ICD-10-CM

## 2019-12-05 DIAGNOSIS — E10.29 MICROALBUMINURIA DUE TO TYPE 1 DIABETES MELLITUS (HCC): ICD-10-CM

## 2019-12-05 DIAGNOSIS — M54.41 BILATERAL LOW BACK PAIN WITH BILATERAL SCIATICA, UNSPECIFIED CHRONICITY: ICD-10-CM

## 2019-12-05 DIAGNOSIS — M25.50 ARTHRALGIA, UNSPECIFIED JOINT: ICD-10-CM

## 2019-12-05 DIAGNOSIS — M54.42 BILATERAL LOW BACK PAIN WITH BILATERAL SCIATICA, UNSPECIFIED CHRONICITY: ICD-10-CM

## 2019-12-05 DIAGNOSIS — N52.8 OTHER MALE ERECTILE DYSFUNCTION: ICD-10-CM

## 2019-12-05 DIAGNOSIS — N31.8 HYPERACTIVITY OF BLADDER: ICD-10-CM

## 2019-12-05 DIAGNOSIS — I48.0 PAROXYSMAL ATRIAL FIBRILLATION (HCC): Chronic | ICD-10-CM

## 2019-12-05 DIAGNOSIS — I25.10 CORONARY ARTERY DISEASE INVOLVING NATIVE CORONARY ARTERY OF NATIVE HEART WITHOUT ANGINA PECTORIS: Chronic | ICD-10-CM

## 2019-12-05 DIAGNOSIS — E78.2 MIXED HYPERLIPIDEMIA: Chronic | ICD-10-CM

## 2019-12-05 DIAGNOSIS — G63 POLYNEUROPATHY ASSOCIATED WITH UNDERLYING DISEASE (HCC): ICD-10-CM

## 2019-12-05 PROCEDURE — 99214 OFFICE O/P EST MOD 30 MIN: CPT | Performed by: FAMILY MEDICINE

## 2019-12-05 RX ORDER — TADALAFIL 5 MG/1
5 TABLET ORAL DAILY
Qty: 90 TABLET | Refills: 3 | Status: SHIPPED | OUTPATIENT
Start: 2019-12-05 | End: 2021-01-22 | Stop reason: SDUPTHER

## 2019-12-05 RX ORDER — ACETAMINOPHEN 500 MG
1000 TABLET ORAL EVERY 6 HOURS PRN
COMMUNITY

## 2019-12-05 RX ORDER — CELECOXIB 200 MG/1
200 CAPSULE ORAL DAILY
Qty: 90 CAPSULE | Refills: 3 | Status: SHIPPED | OUTPATIENT
Start: 2019-12-05 | End: 2021-03-15

## 2019-12-05 RX ORDER — NAPROXEN SODIUM 220 MG
440 TABLET ORAL 2 TIMES DAILY WITH MEALS
COMMUNITY
End: 2019-12-05

## 2019-12-05 RX ORDER — PREGABALIN 150 MG/1
150 CAPSULE ORAL 3 TIMES DAILY
Qty: 270 CAPSULE | Refills: 0 | Status: CANCELLED | OUTPATIENT
Start: 2019-12-05 | End: 2020-03-04

## 2019-12-05 ASSESSMENT — ENCOUNTER SYMPTOMS
NAUSEA: 0
DIARRHEA: 0
ANAL BLEEDING: 0
COUGH: 0
SHORTNESS OF BREATH: 0
CHEST TIGHTNESS: 0
CONSTIPATION: 0
WHEEZING: 0
ABDOMINAL PAIN: 0
VOMITING: 0
BLOOD IN STOOL: 0

## 2019-12-16 DIAGNOSIS — G63 POLYNEUROPATHY ASSOCIATED WITH UNDERLYING DISEASE (HCC): ICD-10-CM

## 2019-12-17 ENCOUNTER — TELEPHONE (OUTPATIENT)
Dept: FAMILY MEDICINE CLINIC | Age: 69
End: 2019-12-17

## 2019-12-17 DIAGNOSIS — G63 POLYNEUROPATHY ASSOCIATED WITH UNDERLYING DISEASE (HCC): ICD-10-CM

## 2019-12-17 RX ORDER — DULOXETIN HYDROCHLORIDE 60 MG/1
CAPSULE, DELAYED RELEASE ORAL
Qty: 90 CAPSULE | Refills: 3 | Status: SHIPPED | OUTPATIENT
Start: 2019-12-17 | End: 2021-06-08

## 2019-12-17 RX ORDER — PREGABALIN 150 MG/1
150 CAPSULE ORAL 3 TIMES DAILY
Qty: 270 CAPSULE | Refills: 0 | Status: SHIPPED | OUTPATIENT
Start: 2019-12-17 | End: 2020-03-13

## 2020-01-22 ENCOUNTER — HOSPITAL ENCOUNTER (OUTPATIENT)
Dept: LAB | Age: 70
Discharge: HOME | End: 2020-01-22
Payer: MEDICARE

## 2020-01-22 ENCOUNTER — APPOINTMENT (OUTPATIENT)
Dept: LAB | Age: 70
End: 2020-01-22
Payer: MEDICARE

## 2020-01-22 LAB
ANION GAP SERPL CALCULATED.3IONS-SCNC: 8 MEQ/L (ref 9–15)
BUN BLDV-MCNC: 22 MG/DL (ref 8–23)
CALCIUM SERPL-MCNC: 9.3 MG/DL (ref 8.5–9.9)
CHLORIDE BLD-SCNC: 99 MEQ/L (ref 95–107)
CO2: 25 MEQ/L (ref 20–31)
CREAT SERPL-MCNC: 0.95 MG/DL (ref 0.7–1.2)
CREATININE URINE: 137.7 MG/DL
GFR AFRICAN AMERICAN: >60
GFR NON-AFRICAN AMERICAN: >60
GLUCOSE BLD-MCNC: 169 MG/DL (ref 70–99)
HBA1C MFR BLD: 7.5 % (ref 4.8–5.9)
MICROALBUMIN UR-MCNC: 9.2 MG/DL
MICROALBUMIN/CREAT UR-RTO: 66.8 MG/G (ref 0–30)
POTASSIUM SERPL-SCNC: 4.9 MEQ/L (ref 3.4–4.9)
SODIUM BLD-SCNC: 132 MEQ/L (ref 135–144)

## 2020-01-22 PROCEDURE — 36415 COLL VENOUS BLD VENIPUNCTURE: CPT

## 2020-01-22 PROCEDURE — 80048 BASIC METABOLIC PNL TOTAL CA: CPT

## 2020-01-22 PROCEDURE — 83036 HEMOGLOBIN GLYCOSYLATED A1C: CPT

## 2020-01-22 PROCEDURE — 82570 ASSAY OF URINE CREATININE: CPT

## 2020-01-22 PROCEDURE — 82043 UR ALBUMIN QUANTITATIVE: CPT

## 2020-01-29 ENCOUNTER — OFFICE VISIT (OUTPATIENT)
Dept: ENDOCRINOLOGY | Age: 70
End: 2020-01-29
Payer: MEDICARE

## 2020-01-29 VITALS
HEIGHT: 72 IN | SYSTOLIC BLOOD PRESSURE: 120 MMHG | BODY MASS INDEX: 34.27 KG/M2 | OXYGEN SATURATION: 96 % | DIASTOLIC BLOOD PRESSURE: 77 MMHG | RESPIRATION RATE: 16 BRPM | HEART RATE: 56 BPM | WEIGHT: 253 LBS

## 2020-01-29 LAB
CHP ED QC CHECK: NORMAL
GLUCOSE BLD-MCNC: 139 MG/DL

## 2020-01-29 PROCEDURE — 82962 GLUCOSE BLOOD TEST: CPT | Performed by: INTERNAL MEDICINE

## 2020-01-29 PROCEDURE — 99213 OFFICE O/P EST LOW 20 MIN: CPT | Performed by: INTERNAL MEDICINE

## 2020-01-29 NOTE — PROGRESS NOTES
Subjective:      Patient ID: Anson Dowd is a 78 y.o. male. 3 months f/u on diabetes 1 diabetes lab review patient still looking into insulin pump  Sees cardiologist he is on ARB requesting medication refill  Eye exam done in September no retinopathy  Diabetes   He presents for his follow-up diabetic visit. He has type 1 diabetes mellitus. Symptoms are stable. Diabetic complications include heart disease and nephropathy. Current diabetic treatment includes insulin injections (Novolin and N R plus NovoLog plus metformin). He is currently taking insulin pre-breakfast, pre-lunch, pre-dinner and at bedtime. His overall blood glucose range is 180-200 mg/dl. (Lab Results       Component                Value               Date                       LABA1C                   7.5 (H)             01/22/2020              Tests 4-5 times day )       Results for Leonard Gilmore (MRN [id number]) as of 1/29/2020 10:04   Ref.  Range 1/22/2020 09:25 1/22/2020 09:26 1/29/2020 09:34   Sodium Latest Ref Range: 135 - 144 mEq/L 132 (L)     Potassium Latest Ref Range: 3.4 - 4.9 mEq/L 4.9     Chloride Latest Ref Range: 95 - 107 mEq/L 99     CO2 Latest Ref Range: 20 - 31 mEq/L 25     BUN Latest Ref Range: 8 - 23 mg/dL 22     Creatinine Latest Ref Range: 0.70 - 1.20 mg/dL 0.95     Anion Gap Latest Ref Range: 9 - 15 mEq/L 8 (L)     GFR Non- Latest Ref Range: >60  >60.0     GFR African American Latest Ref Range: >60  >60.0     Glucose Latest Units: mg/dL 169 (H)  139   Calcium Latest Ref Range: 8.5 - 9.9 mg/dL 9.3     Hemoglobin A1C Latest Ref Range: 4.8 - 5.9 % 7.5 (H)     Creatinine, Ur Latest Ref Range: Not Established mg/dL  137.7    Microalbumin Creatinine Ratio Latest Ref Range: 0.0 - 30.0 mg/G  66.8 (H)    Microalbumin, Random Urine Latest Ref Range: Not Established mg/dL  9.20 (H)      Patient Active Problem List   Diagnosis    Obesity    Pancytopenia (HCC)    Hypertension    Hyperlipidemia    CAD (coronary artery disease)    ED (erectile dysfunction)    GERD (gastroesophageal reflux disease)    Hyperopia of both eyes with astigmatism and presbyopia    Nuclear sclerotic cataract of right eye    Hyperactivity of bladder    Paroxysmal atrial fibrillation ([address]    DM w/ coma type I, uncontrolled ([address]    Microalbuminuria due to type 1 diabetes mellitus (HCC)     Allergies   Allergen Reactions    Atorvastatin Other (See Comments)     Muscle ache    Shellfish Allergy Itching, Rash and Swelling    Simvastatin Other (See Comments)     cough    Pcn [Penicillins] Hives and Rash     Caused eczema as child       Current Outpatient Medications:     blood glucose test strips (ONETOUCH VERIO) strip, USE ONE STRIP TO CHECK GLUCOSE FIVE TIMES DAILY AS NEEDED, Disp: 200 strip, Rfl: 5    insulin aspart (NOVOLOG) 100 UNIT/ML injection vial, Inject 25 Units into the skin 3 times daily (before meals) Indications: Sliding scale in AM and HS, Disp: 3 vial, Rfl: 3    insulin regular (NOVOLIN R) 100 UNIT/ML injection, Inject SC tid per sliding scale up to 100 units daily prn .00, Disp: 6 vial, Rfl: 3    pregabalin (LYRICA) 150 MG capsule, Take 1 capsule by mouth 3 times daily for 90 days. , Disp: 270 capsule, Rfl: 0    DULoxetine (CYMBALTA) 60 MG extended release capsule, One by mouth daily, Disp: 90 capsule, Rfl: 3    acetaminophen (TYLENOL) 500 MG tablet, Take 1,000 mg by mouth every 6 hours as needed for Pain, Disp: , Rfl:     metFORMIN (GLUCOPHAGE) 1000 MG tablet, Take 1 tablet by mouth 2 times daily (with meals), Disp: 180 tablet, Rfl: 3    celecoxib (CELEBREX) 200 MG capsule, Take 1 capsule by mouth daily, Disp: 90 capsule, Rfl: 3    tadalafil (CIALIS) 5 MG tablet, Take 1 tablet by mouth daily, Disp: 90 tablet, Rfl: 3    mirabegron (MYRBETRIQ) 50 MG TB24, Take 50 mg by mouth daily, Disp: 90 tablet, Rfl: 3    Continuous Blood Gluc  ([employer] NUBIA 14 DAY READER) XU, As directed, Disp: 1 Device, Rfl: 0   Continuous Blood Gluc Sensor (FREESTYLE NUBIA 14 DAY SENSOR) MISC, Every 2 weeks, Disp: 2 each, Rfl: 06    insulin lispro (HUMALOG) 100 UNIT/ML injection vial, 80 units, Disp: , Rfl:     Beclomethasone Diprop Monohyd (BECONASE AQ NA), by Nasal route, Disp: , Rfl:     polyethylene glycol (GLYCOLAX) powder, Take 17 g by mouth daily, Disp: , Rfl:     rosuvastatin (CRESTOR) 20 MG tablet, Take 20 mg by mouth, Disp: , Rfl:     insulin NPH (NOVOLIN N) 100 UNIT/ML injection vial, 30 units before breakfast, 40 units with supper, 15 units at bed time, Disp: 8 vial, Rfl: 5    IRBESARTAN PO, Take 300 mg by mouth Take 1/2 tablet BID, Disp: , Rfl:     ezetimibe (ZETIA) 10 MG tablet, Take 10 mg by mouth daily, Disp: , Rfl:     Blood Glucose Monitoring Suppl XU, Test five times daily, Disp: 1 Device, Rfl: 0    Lancets MISC, Test five times daily. , Disp: 200 each, Rfl: 5    aspirin 81 MG tablet, Take 81 mg by mouth daily, Disp: , Rfl:     Insulin Syringes, Disposable, U-100 1 ML MISC, Use qid for dosing insulin as directed. Dx 250.00, Disp: 400 each, Rfl: 3    glucose monitoring kit (FREESTYLE) monitoring kit, 1 kit by Does not apply route daily Free style lite, Disp: 1 kit, Rfl: 0    ULTICARE INSULIN SYRINGE 30G X 1/2\" 1 ML MISC, USE 4 TIMES DAILY FOR DOSING INSULIN AS DIRECTED, Disp: 300 each, Rfl: 3    XARELTO 20 MG TABS tablet, , Disp: , Rfl:     Docusate Sodium (COLACE PO), Take 300 mg by mouth.  , Disp: , Rfl:     metoprolol (TOPROL XL) 50 MG XL tablet, Take 50 mg by mouth daily. , Disp: , Rfl:       Review of Systems    Vitals:    01/29/20 0933   BP: 120/77   Site: Right Upper Arm   Position: Sitting   Cuff Size: Large Adult   Pulse: 56   Resp: 16   SpO2: 96%   Weight: 253 lb (114.8 kg)   Height: 6' (1.829 m)       Objective:   Physical Exam  Constitutional:       Appearance: Normal appearance. HENT:      Head: Normocephalic and atraumatic.    Neck:      Musculoskeletal: Normal range of motion and neck

## 2020-01-30 ENCOUNTER — HOSPITAL ENCOUNTER (OUTPATIENT)
Dept: GENERAL RADIOLOGY | Age: 70
Discharge: HOME | End: 2020-02-01
Payer: MEDICARE

## 2020-01-30 ENCOUNTER — HOSPITAL ENCOUNTER (OUTPATIENT)
Age: 70
Discharge: HOME | End: 2020-02-01
Payer: MEDICARE

## 2020-01-30 PROBLEM — E10.9 TYPE 1 DIABETES MELLITUS ON INSULIN THERAPY (HCC): Status: ACTIVE | Noted: 2020-01-30

## 2020-01-30 PROBLEM — G62.9 NEUROPATHY: Status: ACTIVE | Noted: 2020-01-30

## 2020-01-30 PROBLEM — G82.20: Status: ACTIVE | Noted: 2020-01-30

## 2020-01-30 PROCEDURE — 72100 X-RAY EXAM L-S SPINE 2/3 VWS: CPT

## 2020-02-12 ENCOUNTER — HOSPITAL ENCOUNTER (OUTPATIENT)
Dept: MRI IMAGING | Age: 70
Discharge: HOME | End: 2020-02-14
Payer: MEDICARE

## 2020-02-12 PROCEDURE — 72148 MRI LUMBAR SPINE W/O DYE: CPT

## 2020-02-27 PROBLEM — M48.062 LUMBAR STENOSIS WITH NEUROGENIC CLAUDICATION: Status: ACTIVE | Noted: 2020-02-27

## 2020-03-09 ENCOUNTER — HOSPITAL ENCOUNTER (OUTPATIENT)
Dept: PHYSICAL THERAPY | Age: 70
Setting detail: THERAPIES SERIES
Discharge: HOME | End: 2020-03-09
Payer: MEDICARE

## 2020-03-09 PROCEDURE — 97162 PT EVAL MOD COMPLEX 30 MIN: CPT

## 2020-03-09 PROCEDURE — 97112 NEUROMUSCULAR REEDUCATION: CPT

## 2020-03-09 PROCEDURE — 97530 THERAPEUTIC ACTIVITIES: CPT

## 2020-03-09 ASSESSMENT — PAIN DESCRIPTION - PAIN TYPE: TYPE: CHRONIC PAIN

## 2020-03-09 ASSESSMENT — PAIN SCALES - GENERAL: PAINLEVEL_OUTOF10: 1

## 2020-03-09 ASSESSMENT — PAIN DESCRIPTION - FREQUENCY: FREQUENCY: CONTINUOUS

## 2020-03-09 ASSESSMENT — PAIN DESCRIPTION - DESCRIPTORS: DESCRIPTORS: DULL

## 2020-03-09 ASSESSMENT — PAIN DESCRIPTION - LOCATION: LOCATION: BACK

## 2020-03-09 NOTE — PROGRESS NOTES
Physical Therapy  Initial Assessment  Date: 3/9/2020  Patient Name: Jayme Corrigan  MRN: [id number]  : 1950     Treatment Diagnosis: Lumbar Stenosis with neurogenic claudication   Subjective   General  Chart Reviewed: Yes  Additional Pertinent Hx: Pt reports approx 2 yrs ago noticing foot drop of both feet over time shooting pain down post thigh of both feet; Pt reports increasing falls and now having approx 2 falls per week due to decreased balance issues. Family / Caregiver Present: No  Referring Practitioner: Dr Alexandria Dow   Referral Date : 20  Diagnosis: Lumbar stenosis with neurogenic claudication   Other (Comment): Pt reports that his balance has declined greatly since Dec 2019   PT Visit Information  Onset Date: (approx 2 yrs )  Total # of Visits Approved: 12(1-2 x week for 4-6 weeks )  Total # of Visits to Date: 1  Plan of Care/Certification Expiration Date: 20  No Show: 0  Canceled Appointment: 0  Subjective  Subjective: 1/10 back pain currently \"dull\";  First thing in the morning with back pain leg pain \"8/10\" \"sharp\" pain   Pain Screening  Patient Currently in Pain: Yes  Pain Assessment  Pain Assessment: 0-10  Pain Level: 1  Pain Type: Chronic pain  Pain Location: Back  Pain Descriptors: Dull  Pain Frequency: Continuous  Vital Signs  Patient Currently in Pain: Yes         Objective          AROM RLE (degrees)  RLE AROM: WNL  AROM LLE (degrees)  LLE AROM : WNL  AROM RUE (degrees)  RUE AROM : WNL  AROM LUE (degrees)  LUE AROM : WNL  Spine  Lumbar: SB 50% motion but not able to maintain balance during test; Rot R and L 50% with LOB during turn; Flexion 50% while holding onto handrail on wall and full trunk extension holding onto railing due to LOB  Special Tests: Garza Balance Test 25/56 (<36 is 100% falls risk)    Strength RLE  R Hip Flexion: 4-/5  R Hip ABduction: 4+/5  R Hip ADduction: 4+/5  R Knee Flexion: 4/5  R Knee Extension: 4/5  R Ankle Dorsiflexion: 4-/5;3+/5  R Ankle Plantar flexion:

## 2020-03-11 ENCOUNTER — HOSPITAL ENCOUNTER (OUTPATIENT)
Dept: PHYSICAL THERAPY | Age: 70
Setting detail: THERAPIES SERIES
Discharge: HOME | End: 2020-03-11
Payer: MEDICARE

## 2020-03-11 PROCEDURE — 97530 THERAPEUTIC ACTIVITIES: CPT

## 2020-03-11 PROCEDURE — 97116 GAIT TRAINING THERAPY: CPT

## 2020-03-11 PROCEDURE — 97110 THERAPEUTIC EXERCISES: CPT

## 2020-03-11 ASSESSMENT — PAIN DESCRIPTION - FREQUENCY: FREQUENCY: CONTINUOUS

## 2020-03-11 ASSESSMENT — PAIN DESCRIPTION - LOCATION: LOCATION: BACK

## 2020-03-11 ASSESSMENT — PAIN DESCRIPTION - DESCRIPTORS: DESCRIPTORS: DULL

## 2020-03-11 ASSESSMENT — PAIN DESCRIPTION - PAIN TYPE: TYPE: CHRONIC PAIN

## 2020-03-11 ASSESSMENT — PAIN SCALES - GENERAL: PAINLEVEL_OUTOF10: 1

## 2020-03-11 NOTE — PROGRESS NOTES
Physical Therapy  Daily Treatment Note  Date: 3/11/2020  Patient Name: Andre Ramos  MRN: [id number]     :   1950    Treatment Diagnosis: Lumbar Stenosis with neurogenic claudication     Subjective:   General  Chart Reviewed: Yes  Additional Pertinent Hx: Pt reports approx 2 yrs ago noticing foot drop of both feet over time shooting pain down post thigh of both feet; Pt reports increasing falls and now having approx 2 falls per week due to decreased balance issues. Family / Caregiver Present: No  Referring Practitioner: Dr Darnell Santoyo   PT Visit Information  Onset Date: (approx 2 yrs )  Total # of Visits Approved: 12(1-2 x week for 4-6 weeks )  Total # of Visits to Date: 2  Plan of Care/Certification Expiration Date: 20  No Show: 0  Canceled Appointment: 0  Subjective  Subjective: 1/10 back pain currently denies numness and tingling this date. Pt. states he had several LOB, usually feet tripping over something per pt. Pt. states he did not call Dr. Abby Aguilar office yet. Pt. states he will call today. Pt. states he walks with a cane for the most part, at [address] walks per patient. Pain Screening  Patient Currently in Pain: Yes  Pain Assessment  Pain Assessment: 0-10  Pain Level: 1  Pain Type: Chronic pain  Pain Location: Back  Pain Descriptors: Dull  Pain Frequency: Continuous  Vital Signs  Patient Currently in Pain: Yes  Patient Observation  Observations:  Foot slap B L worse than R        Treatment Activities:                  Ambulation  Ambulation?: Yes  More Ambulation?: Yes  Ambulation 1  Surface: level tile;carpet  Device: No Device  Assistance: Contact guard assistance  Quality of Gait: decreased foot clearance and inc B foot slap tends to reach for stable curfaces for balance with gait   Distance: 20'  Ambulation 2  Surface - 2: level tile;carpet  Device 2: Single point cane  Assistance 2: Contact guard assistance  Quality of Gait 2: improved stability with cane still demos B foot drop Distance: 61'  Ambulation 3  Surface - 3: level tile;carpet  Device 3: [address] 3: Stand by assistance  Quality of Gait 3: improved foot clearance B,  appears more steady. Distance: 120'  Comments: recommended for longer distance or with increased fatigue  Stairs/Curb  Stairs?: No     Balance  Sitting - Static: Good  Sitting - Dynamic: Good  Standing - Static: Fair;-  Standing - Dynamic: Fair;-;Poor;+          Exercises  Exercise 1: bridge with abd bracing x 10 hold 3 sec (mild pain in tailbone )  Exercise 2: SLR with abdominal bracing x 10 hold 3 sec R and L   Exercise 3: hooklying march with abd bracing x 5 ea hold 3 sec tactile cues for core stabilization   Exercise 4: sit<>stand amb 3' ambulate turn 180' reach and sit  3 times   Exercise 5: seated heel and toe raises x 10 hold 5 sec (unable to perform standing)   Exercise 6: supported standing march x 15 hold 5 sec   Exercise 7: hooklying abd bracing x 20 hold 5 sec GTB      Modalities  Cryotherapy (Minutes\Location): pt. declined   Other: Attempted to fit for L AFO but shoe box to narrow for AFO and foot to fit into shoe. Will bring work shoes to therapy next session. Assessment:   Conditions Requiring Skilled Therapeutic Intervention  Body structures, Functions, Activity limitations: Decreased functional mobility ; Decreased strength;Decreased balance; Increased pain  Assessment: Pt. able to tolerate therex witnh no inc in pain except bridge. Mild pain in tailbone but able to continue. Pt. demos improved gait and stability using [employer] but declines. Recommend for long walks or with inc fatigue or pain. Pt. more agreeable to try AFO but footwear today did not allow for fit of AFO. Pt. with inc falls risk due to  nerve involvement in Lumbar spine.     Treatment Diagnosis: Lumbar Stenosis with neurogenic claudication   Prognosis: Good  REQUIRES PT FOLLOW UP: Yes      G-Code:     OutComes Score Goals:  Short term goals  Time Frame for Short term goals: 1-2 times per week from date of evaluation on 3/9/20   Short term goal 1: Pt reports back pain as 5/10 or less with ADLS and household chores   Long term goals  Time Frame for Long term goals : 4-6 weeks from date of evaluation on 3/9/20  Long term goal 1: Pt reports back pain as 2/10 or less with community ambulation and with work duties   Long term goal 2: Pt reports no falls for > 1 month   Long term goal 3: Increase B LE strength to 4+/5 so that pt can ambulate with no reports of pain and able to ambulate safely with or without an AD  Long term goal 4: Pt scores a 46 or > on the Garza Balance Test to decrease risk of fals  Long term goal 5: Indep with HEP   Patient Goals   Patient goals :  To be able to get rid of his back pain and function like normal in regards to his balance issues     Plan:      Plan  Times per week: 1-2x per week for 4-6 weeks   Current Treatment Recommendations: Strengthening, Gait Training, Manual Therapy - Joint Manipulation, Patient/Caregiver Education & Training, Balance Training, Neuromuscular Re-education, Pain Management, Modalities, Home Exercise Program  Plan Comment: estim, US, KT tape, CP/HP         Therapy Time   Individual Concurrent Group Co-treatment   Time In  1000         Time Out  1100         Minutes  [address] [place] [address]  License and [address] Number: [id number]

## 2020-03-12 ENCOUNTER — TELEPHONE (OUTPATIENT)
Dept: FAMILY MEDICINE CLINIC | Age: 70
End: 2020-03-12

## 2020-03-12 DIAGNOSIS — G63 POLYNEUROPATHY ASSOCIATED WITH UNDERLYING DISEASE (HCC): ICD-10-CM

## 2020-03-13 RX ORDER — PREGABALIN 150 MG/1
150 CAPSULE ORAL 3 TIMES DAILY
Qty: 270 CAPSULE | Refills: 0 | OUTPATIENT
Start: 2020-03-13 | End: 2020-06-11

## 2020-03-13 RX ORDER — PREGABALIN 150 MG/1
150 CAPSULE ORAL 3 TIMES DAILY
Qty: 270 CAPSULE | Refills: 0 | Status: SHIPPED | OUTPATIENT
Start: 2020-03-13 | End: 2020-06-17 | Stop reason: SDUPTHER

## 2020-03-17 ENCOUNTER — HOSPITAL ENCOUNTER (OUTPATIENT)
Dept: PHYSICAL THERAPY | Age: 70
Setting detail: THERAPIES SERIES
Discharge: HOME | End: 2020-03-17
Payer: MEDICARE

## 2020-03-17 PROCEDURE — 97110 THERAPEUTIC EXERCISES: CPT

## 2020-03-17 PROCEDURE — 97530 THERAPEUTIC ACTIVITIES: CPT

## 2020-03-17 PROCEDURE — 97116 GAIT TRAINING THERAPY: CPT

## 2020-03-17 ASSESSMENT — PAIN DESCRIPTION - LOCATION: LOCATION: BACK

## 2020-03-17 ASSESSMENT — PAIN DESCRIPTION - PAIN TYPE: TYPE: CHRONIC PAIN

## 2020-03-19 ENCOUNTER — HOSPITAL ENCOUNTER (OUTPATIENT)
Dept: PHYSICAL THERAPY | Age: 70
Setting detail: THERAPIES SERIES
Discharge: HOME | End: 2020-03-19
Payer: MEDICARE

## 2020-03-19 PROCEDURE — 97110 THERAPEUTIC EXERCISES: CPT

## 2020-03-19 ASSESSMENT — PAIN DESCRIPTION - DESCRIPTORS: DESCRIPTORS: SHARP;DULL

## 2020-03-19 ASSESSMENT — PAIN DESCRIPTION - FREQUENCY: FREQUENCY: CONTINUOUS

## 2020-03-19 ASSESSMENT — PAIN DESCRIPTION - PAIN TYPE: TYPE: CHRONIC PAIN

## 2020-03-19 ASSESSMENT — PAIN DESCRIPTION - LOCATION: LOCATION: BACK

## 2020-03-19 NOTE — PROGRESS NOTES
Physical Therapy  Daily Treatment Note  Date: 3/19/2020  Patient Name: Archana Sanon  MRN: [id number]     :   1950    Subjective:   General  Chart Reviewed: Yes  Additional Pertinent Hx: Pt reports approx 2 yrs ago noticing foot drop of both feet over time shooting pain down post thigh of both feet; Pt reports increasing falls and now having approx 2 falls per week due to decreased balance issues. Family / Caregiver Present: No  Referring Practitioner: Dr Jose Schneider   PT Visit Information  Onset Date: (approx 2 yrs )  Total # of Visits Approved: 12(1-2 x week for 4-6 weeks )  Total # of Visits to Date: 4  Plan of Care/Certification Expiration Date: 20  No Show: 0  Canceled Appointment: 0  Subjective  Subjective: Pt reports 3/10 \"dull and but sharp with standing\"   Pain Screening  Patient Currently in Pain: Yes  Pain Assessment  Pain Assessment: 0-10  Pain Type: Chronic pain  Pain Location: Back  Pain Descriptors: Sharp;Dull  Pain Frequency: Continuous  Vital Signs  Patient Currently in Pain: Yes       Treatment Activities:                                      Exercises  Exercise 1: bridge with abd bracing x 10 hold 3 sec (mild pain in tailbone )  Exercise 2: SLR with abdominal bracing 1 x 10; 1 x 5 hold 3 sec R and L   Exercise 3: hooklying march with abd bracing x 20 ea hold 3 sec tactile cues for core stabilization   Exercise 11: Pelvic tilt x 10 H10  Exercise 12: Sidelying hip abd x 10 BLE   Exercise 13: Prone hip extension x 10 BLE   Exercise 14: Prone HS curls x 10 B LE  Exercise 15: Seated HS stretch x 3 H20  Exercise 16: Mini squats x 20                                   Assessment:   Conditions Requiring Skilled Therapeutic Intervention  Body structures, Functions, Activity limitations: Decreased functional mobility ; Decreased strength;Decreased balance; Increased pain  Assessment: Pt reports that his back pain is tolerable today.  Pt reports that he is having hard time reaching his doctors office to see about getting approval for possible surgery. Pt able to tolerate an increase in reps and also added new exercises with good tolerance. Pt is unable to do heel raises or toe raises in standing. PT gave pt a copy of HEP with the new execises from today. Advised pt to continue with exercises, especially if our office has to close do to the [employer] situation, so that the pt can maintain his current strength and also continue to gain strength and mobility. Treatment Diagnosis: Lumbar Stenosis with neurogenic claudication   Prognosis: Good  REQUIRES PT FOLLOW UP: Yes      G-Code:     OutComes Score                                                     Goals:  Short term goals  Time Frame for Short term goals: 1-2 times per week from date of evaluation on 3/9/20   Short term goal 1: Pt reports back pain as 5/10 or less with ADLS and household chores   Long term goals  Time Frame for Long term goals : 4-6 weeks from date of evaluation on 3/9/20  Long term goal 1: Pt reports back pain as 2/10 or less with community ambulation and with work duties   Long term goal 2: Pt reports no falls for > 1 month   Long term goal 3: Increase B LE strength to 4+/5 so that pt can ambulate with no reports of pain and able to ambulate safely with or without an AD  Long term goal 4: Pt scores a 46 or > on the Garza Balance Test to decrease risk of fals  Long term goal 5: Indep with HEP   Patient Goals   Patient goals : To be able to get rid of his back pain and function like normal in regards to his balance issues     Plan:  Continue with current plan.             Therapy Time   Individual Concurrent Group Co-treatment   Time In  9:00am         Time Out  10:00am         Minutes  60 min                 Tori Almazan, #[id number]

## 2020-03-24 ENCOUNTER — HOSPITAL ENCOUNTER (OUTPATIENT)
Dept: PREADMISSION TESTING | Age: 70
Discharge: HOME | End: 2020-03-28
Payer: MEDICARE

## 2020-03-24 VITALS
OXYGEN SATURATION: 99 % | WEIGHT: 245 LBS | HEIGHT: 70 IN | HEART RATE: 84 BPM | TEMPERATURE: 98.9 F | DIASTOLIC BLOOD PRESSURE: 78 MMHG | SYSTOLIC BLOOD PRESSURE: 161 MMHG | BODY MASS INDEX: 35.07 KG/M2 | RESPIRATION RATE: 16 BRPM

## 2020-03-24 LAB
ANION GAP SERPL CALCULATED.3IONS-SCNC: 13 MEQ/L (ref 9–15)
BUN BLDV-MCNC: 17 MG/DL (ref 8–23)
CALCIUM SERPL-MCNC: 10 MG/DL (ref 8.5–9.9)
CHLORIDE BLD-SCNC: 99 MEQ/L (ref 95–107)
CO2: 26 MEQ/L (ref 20–31)
CREAT SERPL-MCNC: 0.93 MG/DL (ref 0.7–1.2)
EKG ATRIAL RATE: 74 BPM
EKG P AXIS: 34 DEGREES
EKG P-R INTERVAL: 148 MS
EKG Q-T INTERVAL: 400 MS
EKG QRS DURATION: 120 MS
EKG QTC CALCULATION (BAZETT): 444 MS
EKG R AXIS: -68 DEGREES
EKG T AXIS: 33 DEGREES
EKG VENTRICULAR RATE: 74 BPM
GFR AFRICAN AMERICAN: >60
GFR NON-AFRICAN AMERICAN: >60
GLUCOSE BLD-MCNC: 186 MG/DL (ref 70–99)
HCT VFR BLD CALC: 43.4 % (ref 42–52)
HEMOGLOBIN: 14.6 G/DL (ref 14–18)
INR BLD: 0.9
MCH RBC QN AUTO: 31.7 PG (ref 27–31.3)
MCHC RBC AUTO-ENTMCNC: 33.6 % (ref 33–37)
MCV RBC AUTO: 94.4 FL (ref 80–100)
PDW BLD-RTO: 13.4 % (ref 11.5–14.5)
PLATELET # BLD: 149 K/UL (ref 130–400)
POTASSIUM SERPL-SCNC: 4.6 MEQ/L (ref 3.4–4.9)
PROTHROMBIN TIME: 12.7 SEC (ref 12.3–14.9)
RBC # BLD: 4.6 M/UL (ref 4.7–6.1)
SODIUM BLD-SCNC: 138 MEQ/L (ref 135–144)
WBC # BLD: 5.5 K/UL (ref 4.8–10.8)

## 2020-03-24 PROCEDURE — 80048 BASIC METABOLIC PNL TOTAL CA: CPT

## 2020-03-24 PROCEDURE — 85610 PROTHROMBIN TIME: CPT

## 2020-03-24 PROCEDURE — 86900 BLOOD TYPING SEROLOGIC ABO: CPT

## 2020-03-24 PROCEDURE — 86850 RBC ANTIBODY SCREEN: CPT

## 2020-03-24 PROCEDURE — 85027 COMPLETE CBC AUTOMATED: CPT

## 2020-03-24 PROCEDURE — 86901 BLOOD TYPING SEROLOGIC RH(D): CPT

## 2020-03-24 PROCEDURE — 93005 ELECTROCARDIOGRAM TRACING: CPT | Performed by: NURSE PRACTITIONER

## 2020-03-24 RX ORDER — SODIUM CHLORIDE, SODIUM LACTATE, POTASSIUM CHLORIDE, CALCIUM CHLORIDE 600; 310; 30; 20 MG/100ML; MG/100ML; MG/100ML; MG/100ML
INJECTION, SOLUTION INTRAVENOUS CONTINUOUS
Status: CANCELLED | OUTPATIENT
Start: 2020-03-27

## 2020-03-24 RX ORDER — SODIUM CHLORIDE 0.9 % (FLUSH) 0.9 %
10 SYRINGE (ML) INJECTION PRN
Status: CANCELLED | OUTPATIENT
Start: 2020-03-27

## 2020-03-24 RX ORDER — CEFAZOLIN SODIUM 2 G/50ML
2 SOLUTION INTRAVENOUS ONCE
Status: CANCELLED | OUTPATIENT
Start: 2020-03-27

## 2020-03-24 RX ORDER — LIDOCAINE HYDROCHLORIDE 10 MG/ML
1 INJECTION, SOLUTION EPIDURAL; INFILTRATION; INTRACAUDAL; PERINEURAL
Status: CANCELLED | OUTPATIENT
Start: 2020-03-27 | End: 2020-03-27

## 2020-03-24 RX ORDER — SODIUM CHLORIDE 0.9 % (FLUSH) 0.9 %
10 SYRINGE (ML) INJECTION EVERY 12 HOURS SCHEDULED
Status: CANCELLED | OUTPATIENT
Start: 2020-03-27

## 2020-03-24 RX ORDER — INSULIN PUMP CONTROLLER
6 EACH MISCELLANEOUS
Qty: 90 EACH | Refills: 3 | Status: ON HOLD
Start: 2020-03-24 | End: 2020-03-27

## 2020-03-24 ASSESSMENT — ENCOUNTER SYMPTOMS
SORE THROAT: 0
COUGH: 0
EYES NEGATIVE: 1
SHORTNESS OF BREATH: 0
CONSTIPATION: 0
TROUBLE SWALLOWING: 0
CHEST TIGHTNESS: 0
ABDOMINAL PAIN: 0
WHEEZING: 0
ALLERGIC/IMMUNOLOGIC NEGATIVE: 1
BACK PAIN: 1
STRIDOR: 0
DIARRHEA: 0
VOMITING: 0
NAUSEA: 0

## 2020-03-24 NOTE — H&P
Nurse Practitioner History and Physical      CHIEF COMPLAINT:  Back pain    HISTORY OF PRESENT ILLNESS:      The patient is a 69 y.o. male with significant past medical history of lumbar stenosis who presents for Lumbar  2-3, 3-4, 4-5 micro decompression. C/o numbness of legs & progressive  & instability. C/o intermittent low back pain. Right leg worse than left leg. PT has increased leg sx. Scheduled for OR. Past Medical History:        Diagnosis Date    Bell's palsy 2002    CAD (coronary artery disease)     ED (erectile dysfunction)     GERD (gastroesophageal reflux disease)     Herpes zoster 2000    Hyperactivity of bladder 1/10/2019    Hyperlipidemia     Hyperopia of both eyes with astigmatism and presbyopia 1/6/2017    Hypertension     Microalbuminuria due to type 1 diabetes mellitus ([address] 12/5/2019    Nuclear sclerotic cataract of right eye 1/6/2017    Obesity     Pancytopenia ([address] 2008    Paroxysmal atrial fibrillation ([address] 9/5/2019    Type 2 diabetes mellitus with diabetic polyneuropathy, with long-term current use of insulin ([address] 11/14/2017     Past Surgical History:    Past Surgical History:   Procedure Laterality Date    CATARACT REMOVAL Left 12/18/2013    COLONOSCOPY  2004    DR GUADARRAMA    COLONOSCOPY  01/28/2016    diverticulosis, int hemorrhoids, 5y repeat (DR MANTILLA)    CYST REMOVAL  1987    testicular cyst    KNEE ARTHROSCOPY  12/2009    LIPOMA RESECTION  1987    scrotal    TONSILLECTOMY  1954    TOTAL KNEE ARTHROPLASTY Left 2013    DR OKEEFE         Medications Prior to Admission:    Current Outpatient Medications   Medication Sig Dispense Refill    Insulin Disposable Pump (OMNIPOD DASH 5 PACK) MISC 6 boxes by Does not apply route every 3 days 90 each 3    pregabalin (LYRICA) 150 MG capsule Take 1 capsule by mouth 3 times daily for 90 days.  270 capsule 0    blood glucose test strips (ONETOUCH VERIO) strip USE ONE STRIP TO CHECK GLUCOSE FIVE TIMES DAILY AS NEEDED [address] [employer] Concern    Not on file   Social History Narrative    Lives alone        1 dog    3 cats        Hobbies: computer games, fishing, boating, vegetable garden, cooking       Family History:       Problem Relation Age of Onset    Diabetes Mother     High Blood Pressure Mother     High Cholesterol Mother     Cancer Father         esophagus (smoker)    Diabetes Brother     High Cholesterol Brother     Heart Attack Maternal Grandfather 76    Diabetes Paternal Grandmother     Heart Attack Paternal Grandfather 71    No Known Problems Daughter     No Known Problems Daughter        Review of Systems   Constitutional: Negative. Negative for chills and fever. HENT: Negative for congestion, ear pain, hearing loss, sore throat, tinnitus and trouble swallowing. Eyes: Negative. Negative for visual disturbance. IOL OS   Respiratory: Negative for cough, chest tightness, shortness of breath, wheezing and stridor. Cardiovascular: Negative for chest pain and palpitations. Gastrointestinal: Negative for abdominal pain, constipation, diarrhea, nausea and vomiting. Endocrine:        Hx diabetes   Genitourinary: Negative for dysuria and frequency. Myrbetrig   Musculoskeletal: Positive for back pain. Negative for myalgias and neck pain. Skin: Negative. Allergic/Immunologic: Negative. Neurological: Negative. Negative for seizures and headaches. Hematological: Negative. Psychiatric/Behavioral: Negative. Vitals: There were no vitals taken for this visit. Physical Exam  Constitutional:       Appearance: He is well-developed. He is obese. HENT:      Head: Normocephalic and atraumatic. Right Ear: Ear canal and external ear normal. There is impacted cerumen. Left Ear: Ear canal and external ear normal. There is impacted cerumen. Nose: No congestion. Mouth/Throat:      Mouth: Mucous membranes are moist.   Eyes:      General: No scleral icterus. mellitus ([facility]    Chronic progressive paraparesis (HCC)    Type 1 diabetes mellitus on insulin therapy ([facility]    Neuropathy    Lumbar stenosis with neurogenic claudication         Plan:  Scheduled for Lumbar 2-3, 3-4, 4-5 micro decompression.     KANE Julio - CNP  3/24/2020  12:54 PM

## 2020-03-25 LAB
ABO/RH: NORMAL
ANTIBODY SCREEN: NORMAL

## 2020-03-26 PROCEDURE — 93010 ELECTROCARDIOGRAM REPORT: CPT | Performed by: INTERNAL MEDICINE

## 2020-03-27 ENCOUNTER — ANESTHESIA (OUTPATIENT)
Dept: OPERATING ROOM | Age: 70
End: 2020-03-27
Payer: MEDICARE

## 2020-03-27 ENCOUNTER — HOSPITAL ENCOUNTER (OUTPATIENT)
Dept: GENERAL RADIOLOGY | Age: 70
Discharge: HOME | End: 2020-03-29
Attending: NEUROLOGICAL SURGERY
Payer: MEDICARE

## 2020-03-27 ENCOUNTER — ANESTHESIA EVENT (OUTPATIENT)
Dept: OPERATING ROOM | Age: 70
End: 2020-03-27
Payer: MEDICARE

## 2020-03-27 ENCOUNTER — HOSPITAL ENCOUNTER (OUTPATIENT)
Age: 70
Discharge: HOME | End: 2020-03-28
Attending: NEUROLOGICAL SURGERY | Admitting: NEUROLOGICAL SURGERY
Payer: MEDICARE

## 2020-03-27 VITALS
OXYGEN SATURATION: 96 % | HEART RATE: 77 BPM | HEIGHT: 70 IN | TEMPERATURE: 97.7 F | WEIGHT: 245 LBS | DIASTOLIC BLOOD PRESSURE: 56 MMHG | BODY MASS INDEX: 35.07 KG/M2 | RESPIRATION RATE: 16 BRPM | SYSTOLIC BLOOD PRESSURE: 132 MMHG

## 2020-03-27 VITALS — TEMPERATURE: 99.9 F | SYSTOLIC BLOOD PRESSURE: 145 MMHG | OXYGEN SATURATION: 97 % | DIASTOLIC BLOOD PRESSURE: 69 MMHG

## 2020-03-27 LAB
GLUCOSE BLD-MCNC: 105 MG/DL (ref 60–115)
GLUCOSE BLD-MCNC: 210 MG/DL (ref 60–115)
GLUCOSE BLD-MCNC: 247 MG/DL (ref 60–115)
GLUCOSE BLD-MCNC: 296 MG/DL (ref 60–115)
PERFORMED ON: ABNORMAL
PERFORMED ON: NORMAL

## 2020-03-27 PROCEDURE — 3209999900 FLUORO FOR SURGICAL PROCEDURES

## 2020-03-27 PROCEDURE — 2500000003 HC RX 250 WO HCPCS: Performed by: NURSE ANESTHETIST, CERTIFIED REGISTERED

## 2020-03-27 PROCEDURE — 2580000003 HC RX 258: Performed by: NEUROLOGICAL SURGERY

## 2020-03-27 PROCEDURE — 6360000002 HC RX W HCPCS: Performed by: NEUROLOGICAL SURGERY

## 2020-03-27 PROCEDURE — 3700000001 HC ADD 15 MINUTES (ANESTHESIA): Performed by: NEUROLOGICAL SURGERY

## 2020-03-27 PROCEDURE — 6360000002 HC RX W HCPCS: Performed by: ANESTHESIOLOGY

## 2020-03-27 PROCEDURE — 6370000000 HC RX 637 (ALT 250 FOR IP): Performed by: NEUROLOGICAL SURGERY

## 2020-03-27 PROCEDURE — 2580000003 HC RX 258: Performed by: NURSE PRACTITIONER

## 2020-03-27 PROCEDURE — 6360000002 HC RX W HCPCS: Performed by: NURSE ANESTHETIST, CERTIFIED REGISTERED

## 2020-03-27 PROCEDURE — 2720000010 HC SURG SUPPLY STERILE: Performed by: NEUROLOGICAL SURGERY

## 2020-03-27 PROCEDURE — 3700000000 HC ANESTHESIA ATTENDED CARE: Performed by: NEUROLOGICAL SURGERY

## 2020-03-27 PROCEDURE — 3600000004 HC SURGERY LEVEL 4 BASE: Performed by: NEUROLOGICAL SURGERY

## 2020-03-27 PROCEDURE — 3600000014 HC SURGERY LEVEL 4 ADDTL 15MIN: Performed by: NEUROLOGICAL SURGERY

## 2020-03-27 PROCEDURE — 7100000000 HC PACU RECOVERY - FIRST 15 MIN: Performed by: NEUROLOGICAL SURGERY

## 2020-03-27 PROCEDURE — 6370000000 HC RX 637 (ALT 250 FOR IP): Performed by: INTERNAL MEDICINE

## 2020-03-27 PROCEDURE — 2500000003 HC RX 250 WO HCPCS: Performed by: ANESTHESIOLOGY

## 2020-03-27 PROCEDURE — 7100000001 HC PACU RECOVERY - ADDTL 15 MIN: Performed by: NEUROLOGICAL SURGERY

## 2020-03-27 PROCEDURE — 76942 ECHO GUIDE FOR BIOPSY: CPT | Performed by: NURSE ANESTHETIST, CERTIFIED REGISTERED

## 2020-03-27 PROCEDURE — 2500000003 HC RX 250 WO HCPCS: Performed by: NEUROLOGICAL SURGERY

## 2020-03-27 PROCEDURE — 2709999900 HC NON-CHARGEABLE SUPPLY: Performed by: NEUROLOGICAL SURGERY

## 2020-03-27 RX ORDER — METOCLOPRAMIDE HYDROCHLORIDE 5 MG/ML
10 INJECTION INTRAMUSCULAR; INTRAVENOUS
Status: DISCONTINUED | OUTPATIENT
Start: 2020-03-27 | End: 2020-03-27 | Stop reason: HOSPADM

## 2020-03-27 RX ORDER — DIPHENHYDRAMINE HYDROCHLORIDE 50 MG/ML
12.5 INJECTION INTRAMUSCULAR; INTRAVENOUS
Status: DISCONTINUED | OUTPATIENT
Start: 2020-03-27 | End: 2020-03-27 | Stop reason: HOSPADM

## 2020-03-27 RX ORDER — ASPIRIN 81 MG/1
81 TABLET, CHEWABLE ORAL DAILY
Status: DISCONTINUED | OUTPATIENT
Start: 2020-03-27 | End: 2020-03-28 | Stop reason: HOSPADM

## 2020-03-27 RX ORDER — SODIUM CHLORIDE 0.9 % (FLUSH) 0.9 %
10 SYRINGE (ML) INJECTION PRN
Status: DISCONTINUED | OUTPATIENT
Start: 2020-03-27 | End: 2020-03-28 | Stop reason: HOSPADM

## 2020-03-27 RX ORDER — DULOXETIN HYDROCHLORIDE 60 MG/1
60 CAPSULE, DELAYED RELEASE ORAL DAILY
Status: DISCONTINUED | OUTPATIENT
Start: 2020-03-28 | End: 2020-03-28 | Stop reason: HOSPADM

## 2020-03-27 RX ORDER — MAGNESIUM HYDROXIDE 1200 MG/15ML
LIQUID ORAL CONTINUOUS PRN
Status: COMPLETED | OUTPATIENT
Start: 2020-03-27 | End: 2020-03-27

## 2020-03-27 RX ORDER — HYDROCODONE BITARTRATE AND ACETAMINOPHEN 5; 325 MG/1; MG/1
2 TABLET ORAL PRN
Status: DISCONTINUED | OUTPATIENT
Start: 2020-03-27 | End: 2020-03-27 | Stop reason: HOSPADM

## 2020-03-27 RX ORDER — MIDAZOLAM HYDROCHLORIDE 1 MG/ML
INJECTION INTRAMUSCULAR; INTRAVENOUS PRN
Status: DISCONTINUED | OUTPATIENT
Start: 2020-03-27 | End: 2020-03-27 | Stop reason: SDUPTHER

## 2020-03-27 RX ORDER — IRBESARTAN 150 MG/1
150 TABLET ORAL EVERY MORNING
Status: DISCONTINUED | OUTPATIENT
Start: 2020-03-28 | End: 2020-03-27 | Stop reason: CLARIF

## 2020-03-27 RX ORDER — LIDOCAINE HYDROCHLORIDE AND EPINEPHRINE BITARTRATE 20; .01 MG/ML; MG/ML
INJECTION, SOLUTION SUBCUTANEOUS PRN
Status: DISCONTINUED | OUTPATIENT
Start: 2020-03-27 | End: 2020-03-27 | Stop reason: SDUPTHER

## 2020-03-27 RX ORDER — ROSUVASTATIN CALCIUM 20 MG/1
20 TABLET, COATED ORAL EVERY EVENING
Status: DISCONTINUED | OUTPATIENT
Start: 2020-03-27 | End: 2020-03-27

## 2020-03-27 RX ORDER — SODIUM CHLORIDE, SODIUM LACTATE, POTASSIUM CHLORIDE, CALCIUM CHLORIDE 600; 310; 30; 20 MG/100ML; MG/100ML; MG/100ML; MG/100ML
INJECTION, SOLUTION INTRAVENOUS CONTINUOUS
Status: DISCONTINUED | OUTPATIENT
Start: 2020-03-27 | End: 2020-03-27

## 2020-03-27 RX ORDER — LIDOCAINE HYDROCHLORIDE 10 MG/ML
1 INJECTION, SOLUTION EPIDURAL; INFILTRATION; INTRACAUDAL; PERINEURAL
Status: DISCONTINUED | OUTPATIENT
Start: 2020-03-27 | End: 2020-03-27 | Stop reason: HOSPADM

## 2020-03-27 RX ORDER — PROPOFOL 10 MG/ML
INJECTION, EMULSION INTRAVENOUS PRN
Status: DISCONTINUED | OUTPATIENT
Start: 2020-03-27 | End: 2020-03-27 | Stop reason: SDUPTHER

## 2020-03-27 RX ORDER — DEXTROSE MONOHYDRATE 50 MG/ML
100 INJECTION, SOLUTION INTRAVENOUS PRN
Status: DISCONTINUED | OUTPATIENT
Start: 2020-03-27 | End: 2020-03-28 | Stop reason: HOSPADM

## 2020-03-27 RX ORDER — CELECOXIB 100 MG/1
200 CAPSULE ORAL DAILY
Status: DISCONTINUED | OUTPATIENT
Start: 2020-03-27 | End: 2020-03-28 | Stop reason: HOSPADM

## 2020-03-27 RX ORDER — LOSARTAN POTASSIUM 25 MG/1
50 TABLET ORAL EVERY MORNING
Status: DISCONTINUED | OUTPATIENT
Start: 2020-03-28 | End: 2020-03-28 | Stop reason: HOSPADM

## 2020-03-27 RX ORDER — PREGABALIN 150 MG/1
150 CAPSULE ORAL 3 TIMES DAILY
Status: DISCONTINUED | OUTPATIENT
Start: 2020-03-27 | End: 2020-03-28 | Stop reason: HOSPADM

## 2020-03-27 RX ORDER — KETAMINE HYDROCHLORIDE 100 MG/ML
INJECTION, SOLUTION INTRAMUSCULAR; INTRAVENOUS PRN
Status: DISCONTINUED | OUTPATIENT
Start: 2020-03-27 | End: 2020-03-27 | Stop reason: SDUPTHER

## 2020-03-27 RX ORDER — ROPIVACAINE HYDROCHLORIDE 5 MG/ML
INJECTION, SOLUTION EPIDURAL; INFILTRATION; PERINEURAL PRN
Status: DISCONTINUED | OUTPATIENT
Start: 2020-03-27 | End: 2020-03-27 | Stop reason: SDUPTHER

## 2020-03-27 RX ORDER — MEPERIDINE HYDROCHLORIDE 25 MG/ML
12.5 INJECTION INTRAMUSCULAR; INTRAVENOUS; SUBCUTANEOUS EVERY 5 MIN PRN
Status: DISCONTINUED | OUTPATIENT
Start: 2020-03-27 | End: 2020-03-27 | Stop reason: HOSPADM

## 2020-03-27 RX ORDER — DEXTROSE MONOHYDRATE 25 G/50ML
12.5 INJECTION, SOLUTION INTRAVENOUS PRN
Status: DISCONTINUED | OUTPATIENT
Start: 2020-03-27 | End: 2020-03-28 | Stop reason: HOSPADM

## 2020-03-27 RX ORDER — BUPIVACAINE HYDROCHLORIDE AND EPINEPHRINE 5; 5 MG/ML; UG/ML
INJECTION, SOLUTION EPIDURAL; INTRACAUDAL; PERINEURAL PRN
Status: DISCONTINUED | OUTPATIENT
Start: 2020-03-27 | End: 2020-03-27 | Stop reason: ALTCHOICE

## 2020-03-27 RX ORDER — ROSUVASTATIN CALCIUM 20 MG/1
20 TABLET, COATED ORAL EVERY EVENING
Status: DISCONTINUED | OUTPATIENT
Start: 2020-03-27 | End: 2020-03-28 | Stop reason: HOSPADM

## 2020-03-27 RX ORDER — ACETAMINOPHEN 325 MG/1
650 TABLET ORAL EVERY 6 HOURS PRN
Status: DISCONTINUED | OUTPATIENT
Start: 2020-03-27 | End: 2020-03-28 | Stop reason: HOSPADM

## 2020-03-27 RX ORDER — ROCURONIUM BROMIDE 10 MG/ML
INJECTION, SOLUTION INTRAVENOUS PRN
Status: DISCONTINUED | OUTPATIENT
Start: 2020-03-27 | End: 2020-03-27 | Stop reason: SDUPTHER

## 2020-03-27 RX ORDER — METOPROLOL SUCCINATE 50 MG/1
50 TABLET, EXTENDED RELEASE ORAL NIGHTLY
Status: DISCONTINUED | OUTPATIENT
Start: 2020-03-27 | End: 2020-03-28 | Stop reason: HOSPADM

## 2020-03-27 RX ORDER — SODIUM CHLORIDE 0.9 % (FLUSH) 0.9 %
10 SYRINGE (ML) INJECTION PRN
Status: DISCONTINUED | OUTPATIENT
Start: 2020-03-27 | End: 2020-03-27 | Stop reason: HOSPADM

## 2020-03-27 RX ORDER — VANCOMYCIN HYDROCHLORIDE 1 G/20ML
INJECTION, POWDER, LYOPHILIZED, FOR SOLUTION INTRAVENOUS PRN
Status: DISCONTINUED | OUTPATIENT
Start: 2020-03-27 | End: 2020-03-27 | Stop reason: ALTCHOICE

## 2020-03-27 RX ORDER — LIDOCAINE HYDROCHLORIDE 20 MG/ML
INJECTION, SOLUTION INTRAVENOUS PRN
Status: DISCONTINUED | OUTPATIENT
Start: 2020-03-27 | End: 2020-03-27 | Stop reason: SDUPTHER

## 2020-03-27 RX ORDER — ACETAMINOPHEN 325 MG/1
650 TABLET ORAL EVERY 4 HOURS PRN
Status: DISCONTINUED | OUTPATIENT
Start: 2020-03-27 | End: 2020-03-28 | Stop reason: HOSPADM

## 2020-03-27 RX ORDER — DEXAMETHASONE SODIUM PHOSPHATE 10 MG/ML
INJECTION INTRAMUSCULAR; INTRAVENOUS PRN
Status: DISCONTINUED | OUTPATIENT
Start: 2020-03-27 | End: 2020-03-27 | Stop reason: SDUPTHER

## 2020-03-27 RX ORDER — SODIUM CHLORIDE 9 MG/ML
INJECTION, SOLUTION INTRAVENOUS CONTINUOUS
Status: DISCONTINUED | OUTPATIENT
Start: 2020-03-27 | End: 2020-03-27

## 2020-03-27 RX ORDER — SODIUM CHLORIDE 0.9 % (FLUSH) 0.9 %
10 SYRINGE (ML) INJECTION EVERY 12 HOURS SCHEDULED
Status: DISCONTINUED | OUTPATIENT
Start: 2020-03-27 | End: 2020-03-27 | Stop reason: HOSPADM

## 2020-03-27 RX ORDER — FENTANYL CITRATE 50 UG/ML
25 INJECTION, SOLUTION INTRAMUSCULAR; INTRAVENOUS EVERY 10 MIN PRN
Status: DISCONTINUED | OUTPATIENT
Start: 2020-03-27 | End: 2020-03-27 | Stop reason: HOSPADM

## 2020-03-27 RX ORDER — ONDANSETRON 2 MG/ML
4 INJECTION INTRAMUSCULAR; INTRAVENOUS
Status: DISCONTINUED | OUTPATIENT
Start: 2020-03-27 | End: 2020-03-27 | Stop reason: HOSPADM

## 2020-03-27 RX ORDER — SODIUM CHLORIDE 0.9 % (FLUSH) 0.9 %
10 SYRINGE (ML) INJECTION EVERY 12 HOURS SCHEDULED
Status: DISCONTINUED | OUTPATIENT
Start: 2020-03-27 | End: 2020-03-28 | Stop reason: HOSPADM

## 2020-03-27 RX ORDER — ONDANSETRON 2 MG/ML
INJECTION INTRAMUSCULAR; INTRAVENOUS PRN
Status: DISCONTINUED | OUTPATIENT
Start: 2020-03-27 | End: 2020-03-27 | Stop reason: SDUPTHER

## 2020-03-27 RX ORDER — HYDROCODONE BITARTRATE AND ACETAMINOPHEN 5; 325 MG/1; MG/1
1 TABLET ORAL PRN
Status: DISCONTINUED | OUTPATIENT
Start: 2020-03-27 | End: 2020-03-27 | Stop reason: HOSPADM

## 2020-03-27 RX ORDER — OXYCODONE HYDROCHLORIDE 5 MG/1
5 TABLET ORAL EVERY 4 HOURS PRN
Status: DISCONTINUED | OUTPATIENT
Start: 2020-03-27 | End: 2020-03-28 | Stop reason: HOSPADM

## 2020-03-27 RX ORDER — DEXAMETHASONE SODIUM PHOSPHATE 4 MG/ML
INJECTION, SOLUTION INTRA-ARTICULAR; INTRALESIONAL; INTRAMUSCULAR; INTRAVENOUS; SOFT TISSUE PRN
Status: DISCONTINUED | OUTPATIENT
Start: 2020-03-27 | End: 2020-03-27 | Stop reason: SDUPTHER

## 2020-03-27 RX ORDER — NICOTINE POLACRILEX 4 MG
15 LOZENGE BUCCAL PRN
Status: DISCONTINUED | OUTPATIENT
Start: 2020-03-27 | End: 2020-03-28 | Stop reason: HOSPADM

## 2020-03-27 RX ADMIN — ROCURONIUM BROMIDE 50 MG: 10 INJECTION INTRAVENOUS at 09:38

## 2020-03-27 RX ADMIN — OXYCODONE 5 MG: 5 TABLET ORAL at 21:08

## 2020-03-27 RX ADMIN — SUGAMMADEX 225 MG: 100 INJECTION, SOLUTION INTRAVENOUS at 13:41

## 2020-03-27 RX ADMIN — SODIUM CHLORIDE, POTASSIUM CHLORIDE, SODIUM LACTATE AND CALCIUM CHLORIDE: 600; 310; 30; 20 INJECTION, SOLUTION INTRAVENOUS at 12:56

## 2020-03-27 RX ADMIN — MIDAZOLAM HYDROCHLORIDE 2 MG: 2 INJECTION, SOLUTION INTRAMUSCULAR; INTRAVENOUS at 09:16

## 2020-03-27 RX ADMIN — VANCOMYCIN HYDROCHLORIDE 1000 MG: 1 INJECTION, POWDER, LYOPHILIZED, FOR SOLUTION INTRAVENOUS at 09:15

## 2020-03-27 RX ADMIN — SODIUM CHLORIDE: 9 INJECTION, SOLUTION INTRAVENOUS at 16:18

## 2020-03-27 RX ADMIN — METOPROLOL SUCCINATE 50 MG: 50 TABLET, EXTENDED RELEASE ORAL at 20:58

## 2020-03-27 RX ADMIN — ROCURONIUM BROMIDE 10 MG: 10 INJECTION INTRAVENOUS at 12:28

## 2020-03-27 RX ADMIN — ROPIVACAINE HYDROCHLORIDE 30 ML: 5 INJECTION, SOLUTION EPIDURAL; INFILTRATION; PERINEURAL at 09:22

## 2020-03-27 RX ADMIN — KETAMINE HYDROCHLORIDE 50 MG: 100 INJECTION INTRAMUSCULAR; INTRAVENOUS at 09:48

## 2020-03-27 RX ADMIN — BISACODYL 5 MG: 5 TABLET, COATED ORAL at 16:18

## 2020-03-27 RX ADMIN — METFORMIN HYDROCHLORIDE 1000 MG: 500 TABLET ORAL at 18:07

## 2020-03-27 RX ADMIN — ROCURONIUM BROMIDE 10 MG: 10 INJECTION INTRAVENOUS at 10:56

## 2020-03-27 RX ADMIN — ONDANSETRON 4 MG: 2 INJECTION INTRAMUSCULAR; INTRAVENOUS at 12:59

## 2020-03-27 RX ADMIN — DEXAMETHASONE SODIUM PHOSPHATE 4 MG: 10 INJECTION INTRAMUSCULAR; INTRAVENOUS at 09:47

## 2020-03-27 RX ADMIN — LIDOCAINE HYDROCHLORIDE,EPINEPHRINE BITARTRATE 10 ML: 20; .01 INJECTION, SOLUTION INFILTRATION; PERINEURAL at 09:22

## 2020-03-27 RX ADMIN — ROCURONIUM BROMIDE 30 MG: 10 INJECTION INTRAVENOUS at 10:13

## 2020-03-27 RX ADMIN — ROCURONIUM BROMIDE 10 MG: 10 INJECTION INTRAVENOUS at 12:01

## 2020-03-27 RX ADMIN — ASPIRIN 81 MG 81 MG: 81 TABLET ORAL at 18:11

## 2020-03-27 RX ADMIN — INSULIN LISPRO 3 UNITS: 100 INJECTION, SOLUTION INTRAVENOUS; SUBCUTANEOUS at 20:59

## 2020-03-27 RX ADMIN — CELECOXIB 200 MG: 100 CAPSULE ORAL at 18:07

## 2020-03-27 RX ADMIN — LIDOCAINE HYDROCHLORIDE 50 MG: 20 INJECTION, SOLUTION INTRAVENOUS at 09:38

## 2020-03-27 RX ADMIN — OXYCODONE 5 MG: 5 TABLET ORAL at 16:18

## 2020-03-27 RX ADMIN — ROCURONIUM BROMIDE 10 MG: 10 INJECTION INTRAVENOUS at 12:52

## 2020-03-27 RX ADMIN — VANCOMYCIN HYDROCHLORIDE 1500 MG: 5 INJECTION, POWDER, LYOPHILIZED, FOR SOLUTION INTRAVENOUS at 20:57

## 2020-03-27 RX ADMIN — INSULIN HUMAN 40 UNITS: 100 INJECTION, SUSPENSION SUBCUTANEOUS at 18:53

## 2020-03-27 RX ADMIN — SODIUM CHLORIDE, POTASSIUM CHLORIDE, SODIUM LACTATE AND CALCIUM CHLORIDE: 600; 310; 30; 20 INJECTION, SOLUTION INTRAVENOUS at 09:21

## 2020-03-27 RX ADMIN — Medication 10 ML: at 21:09

## 2020-03-27 RX ADMIN — PREGABALIN 150 MG: 150 CAPSULE ORAL at 20:58

## 2020-03-27 RX ADMIN — DEXAMETHASONE SODIUM PHOSPHATE 4 MG: 4 INJECTION, SOLUTION INTRA-ARTICULAR; INTRALESIONAL; INTRAMUSCULAR; INTRAVENOUS; SOFT TISSUE at 09:22

## 2020-03-27 RX ADMIN — PROPOFOL 200 MG: 10 INJECTION, EMULSION INTRAVENOUS at 09:38

## 2020-03-27 ASSESSMENT — PULMONARY FUNCTION TESTS
PIF_VALUE: 21
PIF_VALUE: 7
PIF_VALUE: 13
PIF_VALUE: 20
PIF_VALUE: 22
PIF_VALUE: 3
PIF_VALUE: 20
PIF_VALUE: 7
PIF_VALUE: 22
PIF_VALUE: 20
PIF_VALUE: 21
PIF_VALUE: 4
PIF_VALUE: 22
PIF_VALUE: 21
PIF_VALUE: 4
PIF_VALUE: 4
PIF_VALUE: 5
PIF_VALUE: 19
PIF_VALUE: 20
PIF_VALUE: 21
PIF_VALUE: 22
PIF_VALUE: 22
PIF_VALUE: 21
PIF_VALUE: 20
PIF_VALUE: 20
PIF_VALUE: 4
PIF_VALUE: 7
PIF_VALUE: 20
PIF_VALUE: 21
PIF_VALUE: 20
PIF_VALUE: 20
PIF_VALUE: 22
PIF_VALUE: 21
PIF_VALUE: 4
PIF_VALUE: 20
PIF_VALUE: 21
PIF_VALUE: 6
PIF_VALUE: 20
PIF_VALUE: 21
PIF_VALUE: 5
PIF_VALUE: 22
PIF_VALUE: 22
PIF_VALUE: 4
PIF_VALUE: 7
PIF_VALUE: 21
PIF_VALUE: 21
PIF_VALUE: 8
PIF_VALUE: 20
PIF_VALUE: 22
PIF_VALUE: 4
PIF_VALUE: 8
PIF_VALUE: 21
PIF_VALUE: 22
PIF_VALUE: 21
PIF_VALUE: 4
PIF_VALUE: 21
PIF_VALUE: 4
PIF_VALUE: 21
PIF_VALUE: 21
PIF_VALUE: 22
PIF_VALUE: 21
PIF_VALUE: 22
PIF_VALUE: 22
PIF_VALUE: 21
PIF_VALUE: 20
PIF_VALUE: 10
PIF_VALUE: 21
PIF_VALUE: 22
PIF_VALUE: 4
PIF_VALUE: 8
PIF_VALUE: 6
PIF_VALUE: 20
PIF_VALUE: 20
PIF_VALUE: 22
PIF_VALUE: 1
PIF_VALUE: 22
PIF_VALUE: 22
PIF_VALUE: 21
PIF_VALUE: 21
PIF_VALUE: 2
PIF_VALUE: 22
PIF_VALUE: 4
PIF_VALUE: 22
PIF_VALUE: 21
PIF_VALUE: 22
PIF_VALUE: 18
PIF_VALUE: 4
PIF_VALUE: 4
PIF_VALUE: 20
PIF_VALUE: 20
PIF_VALUE: 21
PIF_VALUE: 20
PIF_VALUE: 21
PIF_VALUE: 4
PIF_VALUE: 21
PIF_VALUE: 22
PIF_VALUE: 4
PIF_VALUE: 21
PIF_VALUE: 21
PIF_VALUE: 20
PIF_VALUE: 4
PIF_VALUE: 22
PIF_VALUE: 21
PIF_VALUE: 21
PIF_VALUE: 22
PIF_VALUE: 22
PIF_VALUE: 21
PIF_VALUE: 26
PIF_VALUE: 20
PIF_VALUE: 22
PIF_VALUE: 22
PIF_VALUE: 14
PIF_VALUE: 21
PIF_VALUE: 22
PIF_VALUE: 21
PIF_VALUE: 21
PIF_VALUE: 4
PIF_VALUE: 21
PIF_VALUE: 22
PIF_VALUE: 22
PIF_VALUE: 21
PIF_VALUE: 22
PIF_VALUE: 22
PIF_VALUE: 21
PIF_VALUE: 4
PIF_VALUE: 17
PIF_VALUE: 22
PIF_VALUE: 4
PIF_VALUE: 22
PIF_VALUE: 5
PIF_VALUE: 20
PIF_VALUE: 22
PIF_VALUE: 21
PIF_VALUE: 22
PIF_VALUE: 10
PIF_VALUE: 20
PIF_VALUE: 21
PIF_VALUE: 22
PIF_VALUE: 8
PIF_VALUE: 22
PIF_VALUE: 17
PIF_VALUE: 21
PIF_VALUE: 39
PIF_VALUE: 21
PIF_VALUE: 21
PIF_VALUE: 22
PIF_VALUE: 20
PIF_VALUE: 21
PIF_VALUE: 16
PIF_VALUE: 22
PIF_VALUE: 21
PIF_VALUE: 12
PIF_VALUE: 20
PIF_VALUE: 21
PIF_VALUE: 22
PIF_VALUE: 14
PIF_VALUE: 22
PIF_VALUE: 21
PIF_VALUE: 21
PIF_VALUE: 4
PIF_VALUE: 22
PIF_VALUE: 21
PIF_VALUE: 21
PIF_VALUE: 4
PIF_VALUE: 22
PIF_VALUE: 20
PIF_VALUE: 21
PIF_VALUE: 21
PIF_VALUE: 4
PIF_VALUE: 21
PIF_VALUE: 26
PIF_VALUE: 22
PIF_VALUE: 21
PIF_VALUE: 4
PIF_VALUE: 20
PIF_VALUE: 21
PIF_VALUE: 40
PIF_VALUE: 21
PIF_VALUE: 21
PIF_VALUE: 20
PIF_VALUE: 21
PIF_VALUE: 3
PIF_VALUE: 21
PIF_VALUE: 21
PIF_VALUE: 20
PIF_VALUE: 10
PIF_VALUE: 21
PIF_VALUE: 4
PIF_VALUE: 17
PIF_VALUE: 23
PIF_VALUE: 5
PIF_VALUE: 21
PIF_VALUE: 0
PIF_VALUE: 1
PIF_VALUE: 21
PIF_VALUE: 19
PIF_VALUE: 0
PIF_VALUE: 21
PIF_VALUE: 18
PIF_VALUE: 21
PIF_VALUE: 22
PIF_VALUE: 19
PIF_VALUE: 25
PIF_VALUE: 21
PIF_VALUE: 22
PIF_VALUE: 21
PIF_VALUE: 7
PIF_VALUE: 22
PIF_VALUE: 20
PIF_VALUE: 21
PIF_VALUE: 22
PIF_VALUE: 21
PIF_VALUE: 22
PIF_VALUE: 21
PIF_VALUE: 20
PIF_VALUE: 22
PIF_VALUE: 0
PIF_VALUE: 3
PIF_VALUE: 21

## 2020-03-27 ASSESSMENT — PAIN SCALES - GENERAL
PAINLEVEL_OUTOF10: 4
PAINLEVEL_OUTOF10: 2
PAINLEVEL_OUTOF10: 5

## 2020-03-27 ASSESSMENT — PAIN - FUNCTIONAL ASSESSMENT: PAIN_FUNCTIONAL_ASSESSMENT: 0-10

## 2020-03-27 NOTE — DISCHARGE INSTR - COC
Continuity of Care Form    Patient Name: Wesley Jerry   :  1950  MRN:  [id number]    [address] [place] [address] date:  3/27/2020  Discharge date:  ***    Code Status Order: No Order   Advance Directives:   Advance Care Flowsheet Documentation     Date/Time Healthcare Directive Type of Healthcare Directive Copy in [address] Agent's Name Healthcare Agent's Phone Number    [phone]  No, patient does not have an advance directive for healthcare treatment -- -- -- -- --          Admitting Physician:  Chaitanya Caldwell MD  PCP: Ismael Corona MD    Discharging Nurse: [facility] Unit/Room#: [address]  Discharging Unit Phone Number: ***    Emergency Contact:   Extended Emergency Contact Information  Primary Emergency Contact: Hyacinth Sneha [address] Phone: [phone]  Mobile Phone: [phone]  Relation: Child    Past Surgical History:  Past Surgical History:   Procedure Laterality Date    CARDIAC CATHETERIZATION      CATARACT REMOVAL Left 2013    COLONOSCOPY      DR GUADARRAMA    COLONOSCOPY  2016    diverticulosis, int hemorrhoids, 5y repeat (DR MANTILLA)    CYST REMOVAL Bilateral     testicular cyst     EYE SURGERY      [place] with IOL OS    JOINT REPLACEMENT      LT    KNEE ARTHROSCOPY Right 2009    LIPOMA RESECTION  1987    scrotal   [address] [facility]    TOTAL KNEE ARTHROPLASTY Left     DR Micheal Catalan       Immunization History:   Immunization History   Administered Date(s) Administered    Influenza 10/26/2012, 2013    Influenza Virus Vaccine 10/10/2010, 10/14/2014    Influenza, High Dose (Fluzone 65 yrs and older) 2015, 10/04/2016, 2017, 2018    Pneumococcal Conjugate 13-valent (Lpgtrtb26) 10/04/2016    Pneumococcal Polysaccharide (Qbpjlsbod44) 10/26/2012, 2017    Tdap (Boostrix, Adacel) 10/26/2012    Zoster Live (Zostavax) 2013    Zoster Recombinant (Shingrix) 2019       Active Problems:  Patient Active Problem List   Diagnosis Code    Obesity E66.9    Pancytopenia (HCC) D61.818    Hypertension I10    Hyperlipidemia E78.5    CAD (coronary artery disease) I25.10    ED (erectile dysfunction) N52.9    GERD (gastroesophageal reflux disease) K21.9    Type 2 diabetes mellitus with diabetic polyneuropathy, with long-term current use of insulin ([facility]) E11.42, Z79.4    Hyperopia of both eyes with astigmatism and presbyopia H52.03, H52.203, H52.4    Nuclear sclerotic cataract of right eye H25.11    Hyperactivity of bladder N31.8    Paroxysmal atrial fibrillation ([facility]) I48.0    DM w/ coma type I, uncontrolled ([facility]) E10.69, E10.65    Microalbuminuria due to type 1 diabetes mellitus ([facility]) E10.29, R80.9    Chronic progressive paraparesis ([facility]) G82.20    Type 1 diabetes mellitus on insulin therapy ([facility] E10.9    Neuropathy G62.9    Lumbar stenosis with neurogenic claudication M48.062       Isolation/Infection:   Isolation          No Isolation        Patient Infection Status     None to display          Nurse Assessment:  Last Vital Signs: /79   Pulse 98   Temp 98 °F (36.7 °C) (Temporal)   Resp 16   Ht 5' 10\" (1.778 m)   Wt 245 lb (111.1 kg)   SpO2 98%   BMI 35.15 kg/m²     Last documented pain score (0-10 scale): Pain Level: 3  Last Weight:   Wt Readings from Last 1 Encounters:   20 245 lb (111.1 kg)     Mental Status:  {IP PT MENTAL STATUS:63624}    IV Access:  { GASPER IV ACCESS:141338975}    Nursing Mobility/ADLs:  Walking   {CHP DME BVLW:846046762}  Transfer  {CHP DME IKFK:321023540}  Bathing  {CHP DME UUUL:295547396}  Dressing  {CHP DME FHIR:082285897}  Toileting  {CHP DME PINZ:562559212}  Feeding  {CHP DME FLOO:212940340}  Med Admin  {CHP DME XWF}  Med Delivery   {[facility] MED Delivery:225944467}    Wound Care Documentation and Therapy:        Elimination:  Continence:   · Bowel: {YES / HO:42304}  · Bladder: {YES / Rehab): {Prognosis:2496607568}    Recommended Labs or Other Treatments After Discharge: ***    Physician Certification: I certify the above information and transfer of Kya Burgos  is necessary for the continuing treatment of the diagnosis listed and that he requires {Admit to Appropriate Level of Care:02362} for {GREATER/LESS:769786023} 30 days.      Update Admission H&P: {CHP DME Changes in AXVWQ:926698175}    PHYSICIAN SIGNATURE:  Electronically signed by Ryan Mon MD on 3/27/20 at 1:32 PM EDT

## 2020-03-27 NOTE — BRIEF OP NOTE
Brief Postoperative Note      Patient: Evy Mora  YOB: 1950  MRN: [id number]    Date of Procedure: 3/27/2020    Pre-Op Diagnosis: L 2-3,3-4, 4-5 STENOSIS    Post-Op Diagnosis: Same       Procedure(s):  L 2-3, 3-4, 4-5 MICRODECOMPRESSION    Surgeon(s):  Horace Bonner MD    Assistant:  First Assistant: Mireya Jackman;  Fabby White    Anesthesia: General    Estimated Blood Loss (mL): less than 482     Complications: None    Drains: Wound drain    Findings: stenosis    Electronically signed by Nita Mendez MD on 3/27/2020 at 1:27 PM

## 2020-03-27 NOTE — H&P
Patient:  Dereje Covington  YOB: 1950  Date: 3 27 2020     The patient is a 69 y.o. male who presents today for evaluation of the following problems:          Chief Complaint   Patient presents with    Lower Back Pain         Referred by No ref. provider found     HISTORY OF PRESENT ILLNESS:     He has not tried physical therapy. Date of last of last PT treatment: none           Estimated body mass index is 33.91 kg/m² as calculated from the following:    Height as of this encounter: 6' (1.829 m). Weight as of this encounter: 250 lb (113.4 kg).      PAST MEDICAL, FAMILY AND SOCIAL HISTORY:  Past Medical History        Past Medical History:   Diagnosis Date    Bell's palsy 2002    CAD (coronary artery disease)      ED (erectile dysfunction)      GERD (gastroesophageal reflux disease)      Herpes zoster 2000    Hyperactivity of bladder 1/10/2019    Hyperlipidemia      Hyperopia of both eyes with astigmatism and presbyopia 1/6/2017    Hypertension      Microalbuminuria due to type 1 diabetes mellitus ([address] 12/5/2019    Nuclear sclerotic cataract of right eye 1/6/2017    Obesity      Pancytopenia ([address] 2008    Paroxysmal atrial fibrillation ([address] 9/5/2019    Type 2 diabetes mellitus with diabetic polyneuropathy, with long-term current use of insulin ([address] 11/14/2017         Past Surgical History         Past Surgical History:   Procedure Laterality Date    CATARACT REMOVAL Left 12/18/2013    COLONOSCOPY   2004     DR GUADARRAMA    COLONOSCOPY   01/28/2016     diverticulosis, int hemorrhoids, 5y repeat (DR MANTILLA)    CYST REMOVAL   1987     testicular cyst    KNEE ARTHROSCOPY   12/2009    LIPOMA RESECTION   1987     scrotal    TONSILLECTOMY   1954    TOTAL KNEE ARTHROPLASTY Left 2013     DR OKEEFE         Family History         Family History   Problem Relation Age of Onset    Diabetes Mother      High Blood Pressure Mother      High Cholesterol Mother      Cancer Father esophagus (smoker)    Diabetes Brother      High Cholesterol Brother      Heart Attack Maternal Grandfather 76    Diabetes Paternal Grandmother      Heart Attack Paternal Grandfather 71    No Known Problems Daughter      No Known Problems Daughter                  Current Outpatient Medications on File Prior to Visit   Medication Sig Dispense Refill    aspirin 81 MG tablet Take 81 mg by mouth daily        blood glucose test strips (ONETOUCH VERIO) strip USE ONE STRIP TO CHECK GLUCOSE FIVE TIMES DAILY AS NEEDED 200 strip 5    insulin aspart (NOVOLOG) 100 UNIT/ML injection vial Inject 25 Units into the skin 3 times daily (before meals) Indications: Sliding scale in AM and HS 3 vial 3    insulin regular (NOVOLIN R) 100 UNIT/ML injection Inject SC tid per sliding scale up to 100 units daily prn .00 6 vial 3    pregabalin (LYRICA) 150 MG capsule Take 1 capsule by mouth 3 times daily for 90 days.  270 capsule 0    DULoxetine (CYMBALTA) 60 MG extended release capsule One by mouth daily 90 capsule 3    acetaminophen (TYLENOL) 500 MG tablet Take 1,000 mg by mouth every 6 hours as needed for Pain        metFORMIN (GLUCOPHAGE) 1000 MG tablet Take 1 tablet by mouth 2 times daily (with meals) 180 tablet 3    celecoxib (CELEBREX) 200 MG capsule Take 1 capsule by mouth daily 90 capsule 3    tadalafil (CIALIS) 5 MG tablet Take 1 tablet by mouth daily 90 tablet 3    mirabegron (MYRBETRIQ) 50 MG TB24 Take 50 mg by mouth daily 90 tablet 3    Continuous Blood Gluc  (FREESTYLE NUBIA 14 DAY READER) XU As directed 1 Device 0    Continuous Blood Gluc Sensor (FREESTYLE NUBIA 14 DAY SENSOR) MISC Every 2 weeks 2 each 06    insulin lispro (HUMALOG) 100 UNIT/ML injection vial 80 units        Beclomethasone Diprop Monohyd (BECONASE AQ NA) by Nasal route        polyethylene glycol (GLYCOLAX) powder Take 17 g by mouth daily        rosuvastatin (CRESTOR) 20 MG tablet Take 20 mg by mouth        insulin NPH well maintained. Mild Schmorl's node formation adjacent to the discs. Mild anterior syndesmophytes. No disc-related anterior extradural defect, canal or foraminal stenosis. L1-2 (L1): Mild disc space narrowing and loss of T2 signal indicating mild desiccation. Mild anterior disc bulging and syndesmophyte formation. Loss of concavity of the posterior disc margin and mild to moderate ligamentous hypertrophy causing mild mass    effect on the posterior lateral aspect of the thecal sac. Overall minimal lumbar canal narrowing. Minimal foraminal narrowing due to facet joint arthrosis. L2-3 (L2): Moderate disc space narrowing and desiccation. Mild Schmorl's node formation at the disc margin and mixed  degenerative endplate signal changes. Diffuse bulge of the desiccated disc posteriorly along with moderate right greater than left facet    joint arthrosis results in mild to moderate lumbar canal narrowing. Moderate bilateral foraminal stenosis due to facet joint arthrosis and to a lesser extent disc bulging. L3-4 (L3): Moderate to severe disc space narrowing and desiccation. Vacuum disc phenomena within the disc. Mild anterior syndesmophytes. Diffuse bulge of the disc, facet joint arthrosis and ligamentous hypertrophy result in severe lumbar canal stenosis    with more narrowing of the right side of the spinal canal. Moderate to severe right foraminal stenosis due to facet joint arthrosis, disc space height loss and bulging desiccated disc. Foraminal narrowing on the left is moderate due to facet joint    arthrosis and disc bulging. L4-5 (L4): Moderate to severe disc space narrowing, desiccation and vacuum disc phenomena. Mild degenerative endplate signal changes at the superior endplate of L5. Diffuse bulge of the disc eccentric towards the right along with moderate right greater    than left ligamentous hypertrophy results in severe lumbar canal stenosis, slightly less pronounced than at L3-4. Severe right foraminal stenosis due to facet arthrosis, and disc endplate osteophyte complex. Moderate left foraminal stenosis due to facet    joint arthrosis and bulging disc. L5-S1 (L5):Moderate to severe disc space narrowing, desiccation and vacuum disc phenomena. Grade 1 anterolisthesis of L5 relative to S1 due to disc space height loss and moderate to marked lower facet joint arthrosis. Diffuse bulging of the disc causes    very minimal mass effect on the anterior thecal sac, but along with facet joint arthrosis there is mild to moderate lumbar canal narrowing. Severe left foraminal stenosis due to disc space height loss, anterolisthesis, facet joint arthrosis and    desiccated disc-endplate osteophyte complex. Milder degree of right foraminal stenosis mainly due to disc space height loss, disc bulge and the anterolisthesis. Impression       Diffuse degenerative disc disease, facet joint arthrosis and ligamentous hypertrophy with varying degrees of lumbar canal and foraminal stenosis described in detail above. Lumbar canal stenosis is severe at L3-4 and L4-5. HISTORY OF PRESENT ILLNESS:  Javier Arechiga,  1950. The patient is 78years old, 6 feet tall and 250 pounds with a BMI of 33. He comes in today with a positive history for progressive paraparesis in his lower extremities. He still maintains bladder and bowel control. He comes in complaining of pain but he is actually having not only pain, but severe weakness into both lower extremities, progressive over these last several months. 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 From the waist down he is having back pain. His legs are getting weak. He has had trouble with his balance. He has more weakness on the right side than he does on the left. Sensation is still intact but he gets some tingling into the feet.   However, he has neuropathy from his type 1 diabetes mellitus, being on insulin for over 30 years, cared for by Dr. Selvin Villa. In the mornings his buttocks and the quads and his calves all are tight, aching and hard to move. MRI and x-rays show severe canal stenosis L2, 3, 4, 5. He has an old 50-year injury to his right ankle. He has had his left knee replaced. He has had no treatment. This has been coming on progressively over this last year, but particularly bad these last few months. He has been using a cane for two weeks because his legs just will not support him. PHYSICAL EXAMINATION:  Examination shows a very pleasant, intelligent 54-year-old male. He gets up out of the chair. He walks with a shuffling type of gait. He cannot go up on his toes or his heels because of his weakness. Individual muscle strength testing shows he is paraparetic in both lower extremities, more so distally. Light touch still seemed to be intact but probably reduced, again not sure if this is from his neuropathy or from cauda equina syndrome. Deep tendon reflexes are absent in the lower extremities. Radialis pulses palpable. Abdomen protuberant. Bowel sounds present. No adenopathy in the groins or neck. He has good strength, sensation and range of motion in the upper extremities. Lungs were clear. Heart tones were normal.  No rubs or murmurs. Cranial nerves II through XII were grossly intact. TREATMENT AND RECOMMENDATIONS:  This patient has progressive paraparesis. I am very concerned about cauda equina syndrome. I have discussed the procedure, indications and risks of L2, 3, 4, 5 microdecompression. The risks are small but still present including something serious like even death, paralysis, sensory loss, loss of bladder or bowel control, pain, bleeding, infection, CSF leak, spinal instability, chance of recurrence and so forth. Surgery is not a guarantee of normalcy.   We cannot undo any permanent damage already done, and cannot change the course of any of the patient's other

## 2020-03-27 NOTE — DISCHARGE SUMMARY
[address]                      [address], [address] [facility]                               DISCHARGE SUMMARY    PATIENT NAME: Sudhir Celeste                     :        1950  MED REC NO:   [id number]                            ROOM:       W265  ACCOUNT NO:   [de-identified]                           ADMIT DATE: 2020  PROVIDER:     Morena Campbell MD                      [facility] [place] DATE: 2020, bilateral L2-3, L3-4, L4-5 microdissection and  decompression. Wound drain in place, removal on the next day. The  patient tolerated the procedure well. Once he is ambulatory with  bladder control, plan to discharge in good condition. The patient has diabetes mellitus, on insulin. The hospitalist was  consulted for evaluation and treatment of his diabetes and medication  needs. DISCHARGE DIAGNOSES:  1. L2-3, L3-4, L4-5 severe canal stenosis with neurogenic claudication  and paraparesis, improved. 2.  Insulin-dependent diabetes mellitus, long-term. The patient has been instructed to keep the wound clean and dry about  three days, avoiding soaking or soap for a week. Recheck in a month. If he has any questions or problems, contact the office.   Patient discharged to home postoperative day #1  Addended 3/30/2020    Redd Thomas MD    D: 2020 13:36:33       T: 2020 13:46:40     [email or web address]  Job#: [id number]     Doc#: [id number]    CC:

## 2020-03-27 NOTE — PROGRESS NOTES
Pt states he'd like a cool cloth for his eyes as his eyes are burning. Damp washcloth on pts eyes. Dr Uriah Erickson at bedside speaking with pt and was informed of above. States this is ok.

## 2020-03-27 NOTE — ANESTHESIA POSTPROCEDURE EVALUATION
Department of Anesthesiology  Postprocedure Note    Patient: Anson Dowd  MRN: [id number]  YOB: 1950  Date of evaluation: 3/27/2020  Time:  1:58 PM     Procedure Summary     Date:  03/27/20 Room / Location:  [address]    Anesthesia Start:  0419 Anesthesia Stop:  3908    Procedure:  L 2-3, 3-4, 4-5 MICRODECOMPRESSION (N/A ) Diagnosis:  (L 2-3,3-4, 4-5 STENOSIS)    Surgeon:  Roddy Sarmiento MD Responsible Provider:  KANE Lambert CRNA    Anesthesia Type:  general, regional ASA Status:  3          Anesthesia Type: general, regional    Maik Phase I: Maik Score: 8    Maik Phase II:      Last vitals: Reviewed and per EMR flowsheets.        Anesthesia Post Evaluation    Patient location during evaluation: bedside  Patient participation: complete - patient participated  Level of consciousness: awake and awake and alert  Pain score: 0  Airway patency: patent  Nausea & Vomiting: no nausea and no vomiting  Complications: no  Cardiovascular status: blood pressure returned to baseline and hemodynamically stable  Respiratory status: acceptable  Hydration status: euvolemic

## 2020-03-27 NOTE — ANESTHESIA PROCEDURE NOTES
Peripheral Block    Patient location during procedure: pre-op  Start time: 3/27/2020 9:18 AM  End time: 3/27/2020 9:26 AM  Staffing  Anesthesiologist: Leslie Jackson MD  Performed: anesthesiologist   Preanesthetic Checklist  Completed: patient identified, site marked, surgical consent, pre-op evaluation, timeout performed, IV checked, risks and benefits discussed, monitors and equipment checked, anesthesia consent given, oxygen available and patient being monitored  Peripheral Block  Patient position: sitting  Prep: ChloraPrep  Patient monitoring: cardiac monitor, continuous pulse ox, frequent blood pressure checks and IV access  Block type: Erector spinae  Laterality: bilateral  Injection technique: single-shot  Procedures: ultrasound guided and nerve stimulator  Local infiltration: ropivacaine, lidocaine and decadron (20 ml of NS added)  Infiltration strength: 0.5 %  Dose: 30 mL  Provider prep: mask and sterile gloves (Sterile probe cover)  Local infiltration: ropivacaine, lidocaine and decadron (20 ml of NS added)  Needle  Needle type: combined needle/nerve stimulator   Needle gauge: 21 G  Needle length: 10 cm  Needle localization: anatomical landmarks and ultrasound guidance  Assessment  Injection assessment: negative aspiration for heme, no paresthesia on injection and local visualized surrounding nerve on ultrasound  Paresthesia pain: immediately resolved  Slow fractionated injection: yes  Hemodynamics: stable  Additional Notes  Ultrasound image printed and saved in patient chart.     Sterile probe cover used    Reason for block: post-op pain management and at surgeon's request

## 2020-03-27 NOTE — ANESTHESIA PRE PROCEDURE
Department of Anesthesiology  Preprocedure Note       Name:  Beata Lloyd   Age:  69 y.o.  :  1950                                          MRN:  [id number]         Date:  3/27/2020      Surgeon: Mini Uribe):  Emma Hunt MD    Procedure: L 2-3, 3-4, 4-5 MICRODECOMPRESSION, 2.5 HRS/ 1 C-ARM/ POWER RONGEUR (N/A )    Medications prior to admission:   Prior to Admission medications    Medication Sig Start Date End Date Taking? Authorizing Provider   insulin lispro (HUMALOG) 100 UNIT/ML injection vial Inject 25 Units into the skin 3 times daily (before meals) Indications: at lunch time   Yes Historical Provider, MD   pregabalin (LYRICA) 150 MG capsule Take 1 capsule by mouth 3 times daily for 90 days.  3/13/20 6/11/20 Yes Sudheer Bell MD   blood glucose test strips (ONETOUCH VERIO) strip USE ONE STRIP TO CHECK GLUCOSE FIVE TIMES DAILY AS NEEDED 20  Yes GOLDIE Munroe MD   insulin aspart (NOVOLOG) 100 UNIT/ML injection vial Inject 25 Units into the skin 3 times daily (before meals) Indications: Sliding scale in AM and HS  Patient taking differently: Inject 25 Units into the skin 3 times daily (before meals) Indications: Sliding scale in AM and HS Took 12 units this AM 20  Yes GOLDIE Munroe MD   insulin regular (NOVOLIN R) 100 UNIT/ML injection Inject SC tid per sliding scale up to 100 units daily prn .00  Patient taking differently: Inject 40 Units into the skin every evening Indications: at supper / breakfast 30-35 units Inject SC tid per sliding scale up to 100 units daily prn .00 20  Yes GOLDIE Munroe MD   DULoxetine (CYMBALTA) 60 MG extended release capsule One by mouth daily  Patient taking differently: every evening One by mouth daily 19  Yes Sudheer Bell MD   acetaminophen (TYLENOL) 500 MG tablet Take 1,000 mg by mouth every 6 hours as needed for Pain   Yes Historical Provider, MD   metFORMIN (GLUCOPHAGE) 1000 MG tablet Take 1 tablet by mouth 2 times daily (with meals)  DM w/ coma type I, uncontrolled ([facility]) E10.69, E10.65    Microalbuminuria due to type 1 diabetes mellitus ([address] E10.29, R80.9    Chronic progressive paraparesis ([facility]) G82.20    Type 1 diabetes mellitus on insulin therapy ([address] E10.9    Neuropathy G62.9    Lumbar stenosis with neurogenic claudication M48.062       Past Medical History:        Diagnosis Date    Arthritis     Bell's palsy 2002    CAD (coronary artery disease)     no stents    ED (erectile dysfunction)     GERD (gastroesophageal reflux disease)     Herpes zoster 2000    Hyperactivity of bladder 1/10/2019    Hyperlipidemia     meds > 20 yrs    Hyperopia of both eyes with astigmatism and presbyopia 1/6/2017    Hypertension     meds > 20 yrs    Microalbuminuria due to type 1 diabetes mellitus ([address] 12/5/2019    Nuclear sclerotic cataract of right eye 1/6/2017    Obesity     Pancytopenia ([address] 2008    Paroxysmal atrial fibrillation ([address] 9/5/2019    Type 2 diabetes mellitus with diabetic polyneuropathy, with long-term current use of insulin ([address] 11/14/2017    hx since 1988 / Insulin dependent 1991       Past Surgical History:        Procedure Laterality Date    CARDIAC CATHETERIZATION  2007    CATARACT REMOVAL Left 12/18/2013    COLONOSCOPY  2004    DR GUADARRAMA    COLONOSCOPY  01/28/2016    diverticulosis, int hemorrhoids, 5y repeat (DR MANTILLA)    CYST REMOVAL Bilateral 1987    testicular cyst     EYE SURGERY      [place] with IOL OS    JOINT REPLACEMENT  2013    LTKR    KNEE ARTHROSCOPY Right 12/2009    LIPOMA RESECTION  1987    scrotal    TONSILLECTOMY  1954    TOTAL KNEE ARTHROPLASTY Left 2013    DR OKEEFE       Social History:    Social History     Tobacco Use    Smoking status: Never Smoker    Smokeless tobacco: Never Used   Substance Use Topics    Alcohol use:  Yes     Alcohol/week: 1.0 - 2.0 standard drinks     Types: 1 - 2 Glasses of wine per week     Frequency: 4 or more times a week     Drinks per session: 1 or 2 Binge frequency: Less than monthly     Comment: social                                Counseling given: Not Answered      Vital Signs (Current): There were no vitals filed for this visit. BP Readings from Last 3 Encounters:   03/24/20 (!) 161/78   01/29/20 120/77   12/05/19 124/78       NPO Status: Time of last liquid consumption: 2300                        Time of last solid consumption: 2200                        Date of last liquid consumption: 03/26/20                        Date of last solid food consumption: 03/26/20    BMI:   Wt Readings from Last 3 Encounters:   03/24/20 245 lb (111.1 kg)   02/27/20 250 lb (113.4 kg)   01/30/20 250 lb (113.4 kg)     There is no height or weight on file to calculate BMI.    CBC:   Lab Results   Component Value Date    WBC 5.5 03/24/2020    RBC 4.60 03/24/2020    RBC 4.66 02/20/2012    HGB 14.6 03/24/2020    HCT 43.4 03/24/2020    MCV 94.4 03/24/2020    RDW 13.4 03/24/2020     03/24/2020       CMP:   Lab Results   Component Value Date     03/24/2020    K 4.6 03/24/2020    CL 99 03/24/2020    CO2 26 03/24/2020    BUN 17 03/24/2020    CREATININE 0.93 03/24/2020    GFRAA >60.0 03/24/2020    LABGLOM >60.0 03/24/2020    GLUCOSE 186 03/24/2020    GLUCOSE 190 02/20/2012    PROT 6.8 10/11/2019    CALCIUM 10.0 03/24/2020    BILITOT <0.2 10/11/2019    ALKPHOS 61 10/11/2019    AST 23 10/11/2019    ALT 26 10/11/2019       POC Tests: No results for input(s): POCGLU, POCNA, POCK, POCCL, POCBUN, POCHEMO, POCHCT in the last 72 hours.     Coags:   Lab Results   Component Value Date    PROTIME 12.7 03/24/2020    PROTIME 10.2 02/20/2012    INR 0.9 03/24/2020       HCG (If Applicable): No results found for: PREGTESTUR, PREGSERUM, HCG, HCGQUANT     ABGs: No results found for: PHART, PO2ART, ZDU2UKB, ZBY6IBE, BEART, Y0WTNULJ     Type & Screen (If Applicable):  No results found for: ARMANI [facility]    Anesthesia Evaluation    Airway: Mallampati: II  TM distance: >3 FB   Neck ROM: full  Mouth opening: > = 3 FB Dental: normal exam         Pulmonary:Negative Pulmonary ROS breath sounds clear to auscultation                             Cardiovascular:    (+) hypertension:, hyperlipidemia    (-) CAD    ECG reviewed  Rhythm: regular                      Neuro/Psych:   (+) neuromuscular disease:,             GI/Hepatic/Renal:   (+) GERD: well controlled,           Endo/Other:    (+) DiabetesType II DM, using insulin, . Abdominal:           Vascular: negative vascular ROS. Anesthesia Plan      general and regional     ASA 3     (US guided Erector Spinae Plane block)  Induction: intravenous. MIPS: Prophylactic antiemetics administered. Anesthetic plan and risks discussed with patient. Plan discussed with CRNA.     Attending anesthesiologist reviewed and agrees with Pre Eval content              Mike Galvin MD   3/27/2020

## 2020-03-28 LAB
ANION GAP SERPL CALCULATED.3IONS-SCNC: 10 MEQ/L (ref 9–15)
BASOPHILS ABSOLUTE: 0 K/UL (ref 0–0.2)
BASOPHILS RELATIVE PERCENT: 0.5 %
BUN BLDV-MCNC: 21 MG/DL (ref 8–23)
CALCIUM SERPL-MCNC: 8.3 MG/DL (ref 8.5–9.9)
CHLORIDE BLD-SCNC: 101 MEQ/L (ref 95–107)
CO2: 26 MEQ/L (ref 20–31)
CREAT SERPL-MCNC: 1.13 MG/DL (ref 0.7–1.2)
EOSINOPHILS ABSOLUTE: 0 K/UL (ref 0–0.7)
EOSINOPHILS RELATIVE PERCENT: 0.5 %
GFR AFRICAN AMERICAN: >60
GFR NON-AFRICAN AMERICAN: >60
GLUCOSE BLD-MCNC: 174 MG/DL (ref 60–115)
GLUCOSE BLD-MCNC: 87 MG/DL (ref 70–99)
HBA1C MFR BLD: 7.5 % (ref 4.8–5.9)
HCT VFR BLD CALC: 36.2 % (ref 42–52)
HEMOGLOBIN: 12.3 G/DL (ref 14–18)
LYMPHOCYTES ABSOLUTE: 1.2 K/UL (ref 1–4.8)
LYMPHOCYTES RELATIVE PERCENT: 15.8 %
MCH RBC QN AUTO: 32.1 PG (ref 27–31.3)
MCHC RBC AUTO-ENTMCNC: 34.1 % (ref 33–37)
MCV RBC AUTO: 94.2 FL (ref 80–100)
MONOCYTES ABSOLUTE: 1 K/UL (ref 0.2–0.8)
MONOCYTES RELATIVE PERCENT: 12.7 %
NEUTROPHILS ABSOLUTE: 5.5 K/UL (ref 1.4–6.5)
NEUTROPHILS RELATIVE PERCENT: 70.5 %
PDW BLD-RTO: 13.3 % (ref 11.5–14.5)
PERFORMED ON: ABNORMAL
PLATELET # BLD: 154 K/UL (ref 130–400)
POTASSIUM SERPL-SCNC: 4.6 MEQ/L (ref 3.4–4.9)
RBC # BLD: 3.85 M/UL (ref 4.7–6.1)
SODIUM BLD-SCNC: 137 MEQ/L (ref 135–144)
WBC # BLD: 7.8 K/UL (ref 4.8–10.8)

## 2020-03-28 PROCEDURE — 36415 COLL VENOUS BLD VENIPUNCTURE: CPT

## 2020-03-28 PROCEDURE — 2580000003 HC RX 258: Performed by: NEUROLOGICAL SURGERY

## 2020-03-28 PROCEDURE — 83036 HEMOGLOBIN GLYCOSYLATED A1C: CPT

## 2020-03-28 PROCEDURE — 6370000000 HC RX 637 (ALT 250 FOR IP): Performed by: INTERNAL MEDICINE

## 2020-03-28 PROCEDURE — 80048 BASIC METABOLIC PNL TOTAL CA: CPT

## 2020-03-28 PROCEDURE — 85025 COMPLETE CBC W/AUTO DIFF WBC: CPT

## 2020-03-28 PROCEDURE — 6370000000 HC RX 637 (ALT 250 FOR IP): Performed by: NEUROLOGICAL SURGERY

## 2020-03-28 RX ADMIN — ACETAMINOPHEN 650 MG: 325 TABLET ORAL at 09:29

## 2020-03-28 RX ADMIN — ASPIRIN 81 MG 81 MG: 81 TABLET ORAL at 09:30

## 2020-03-28 RX ADMIN — Medication 10 ML: at 09:30

## 2020-03-28 RX ADMIN — INSULIN HUMAN 40 UNITS: 100 INJECTION, SUSPENSION SUBCUTANEOUS at 09:36

## 2020-03-28 RX ADMIN — LOSARTAN POTASSIUM 50 MG: 25 TABLET ORAL at 09:30

## 2020-03-28 RX ADMIN — METFORMIN HYDROCHLORIDE 1000 MG: 500 TABLET ORAL at 09:29

## 2020-03-28 RX ADMIN — OXYCODONE 5 MG: 5 TABLET ORAL at 02:19

## 2020-03-28 RX ADMIN — CELECOXIB 200 MG: 100 CAPSULE ORAL at 09:29

## 2020-03-28 RX ADMIN — PREGABALIN 150 MG: 150 CAPSULE ORAL at 09:29

## 2020-03-28 RX ADMIN — BISACODYL 5 MG: 5 TABLET, COATED ORAL at 09:29

## 2020-03-28 RX ADMIN — OXYCODONE 5 MG: 5 TABLET ORAL at 06:27

## 2020-03-28 RX ADMIN — DULOXETINE HYDROCHLORIDE 60 MG: 60 CAPSULE, DELAYED RELEASE ORAL at 09:29

## 2020-03-28 ASSESSMENT — PAIN SCALES - GENERAL
PAINLEVEL_OUTOF10: 6
PAINLEVEL_OUTOF10: 7
PAINLEVEL_OUTOF10: 5

## 2020-03-28 NOTE — OP NOTE
Foraminotomy  performed for the exit of the nerve root. This procedure was first  performed at right L4-L5, it was then repeated at the other five levels  right L3-L4, right L2-L3 then left L2-L3, left L3-L4 and left L4-L5. There was minimal blood loss. The patient tolerated it well. The wound  was thoroughly irrigated. Some vancomycin powder was placed. A wound  drain was placed. The micro retractors were removed. The fascia was  closed with 0 Vicryl suture. Subcutaneous tissues were closed with 2-0,  3-0, 4-0 Vicryl suture. EBL less than 100 mL. No blood transfused.         Rebecca Collins MD    D: 03/27/2020 13:34:27       T: 03/27/2020 13:44:31     [email or web address]  Job#: [id number]     Doc#: [id number]    CC:

## 2020-03-28 NOTE — PROGRESS NOTES
2100 axox4 follows commands c/o pain in legs medicated with oxycodone, neuro within normal limits, dressing dry intact to back    0625 hemovac drain discontinued

## 2020-03-28 NOTE — PROGRESS NOTES
Patient is doing well. The patient has been discharged by Dr. Luis Darden. Patient is already dressed up and ready to go. Patient denies any chest pain, abdominal pain, nausea, vomiting, diarrhea, dysuria or hematuria. ROS: 12 system review otherwise is negative for acute signs or symptoms over the last 24 hrs. General appearance: alert, appears stated age and cooperative, no acute distress, morbidly obese. Skin: Skin color, texture, turgor normal. No rashes or lesions  HEENT: Head: Normocephalic, no lesions, without obvious abnormality. Neck: no adenopathy, no carotid bruit, no JVD, supple, symmetrical, trachea midline and thyroid not enlarged, symmetric, no tenderness/mass/nodules  Lungs: clear to auscultation bilaterally  Heart: regular rate and rhythm, S1, S2 normal, no murmur, click, rub or gallop  Abdomen: soft, non-tender; bowel sounds normal; no masses,  no organomegaly increased abdominal girth therefore clinically I could not exclude the possibility of intra-abdominal mass or organomegaly. Extremities: extremities normal, atraumatic, no cyanosis or edema  Neurologic: Mental status: Alert, oriented, thought content appropriate       Assessment and plan: This documentation may or may not be complete, inclusive or conclusive. *Status post discectomy.     *Hypertension.     *Diabetes.     *Obesity.     *History of paroxysmal A. fib. Currently in sinus rhythm. Patient has been discharged already by Dr. Elmer Sullivan in. The patient was instructed to follow-up with the primary care doctor. Patient has been taking complex insulin regimen. Patient is not willing to change his regimen understanding of potential danger. Patient reported that he was seen by endocrinologist Dr. Alesha Londono few weeks ago. Dr. Cutler was not happy about his insulin regimen. Patient did not allow any change to take place. I may or may not have addressed all of this pt symptoms, medical issues, abnormal labs and findings.  Pt will

## 2020-03-28 NOTE — CONSULTS
Continuous Blood Gluc Sensor (FREESTYLE NUBIA 14 DAY SENSOR) MISC Every 2 weeks 9/24/19  Yes Will Portillo MD   Beclomethasone Diprop Monohyd (BECONASE AQ NA) by Nasal route 2 times daily    Yes Historical Provider, MD   polyethylene glycol (GLYCOLAX) powder Take 17 g by mouth daily   Yes Historical Provider, MD   insulin NPH (NOVOLIN N) 100 UNIT/ML injection vial 30 units before breakfast, 40 units with supper, 15 units at bed time  Patient taking differently: Inject 40 Units into the skin every evening Indications: with supper / 30-35 units at breakfast 30 units before breakfast, 40 units with supper, 15 units at bed time 3/21/19  Yes Renetta Cui MD   IRBESARTAN PO Take 300 mg by mouth every morning Take 1/2 tablet BID   Yes Historical Provider, MD   Blood Glucose Monitoring Suppl XU Test five times daily 1/11/18  Yes Renetta Cui MD   Lancets MISC Test five times daily. 1/11/18  Yes Renetta Cui MD   Insulin Syringes, Disposable, U-100 1 ML MISC Use qid for dosing insulin as directed.  Dx 250.00 11/14/17  Yes Renetta Cui MD   glucose monitoring kit (FREESTYLE) monitoring kit 1 kit by Does not apply route daily Free style lite 3/25/17  Yes Renetta Cui MD   Harlee Severance INSULIN SYRINGE 30G X 1/2\" 1 ML MISC USE 4 TIMES DAILY FOR DOSING INSULIN AS DIRECTED 10/18/14  Yes Renetta Cui MD   Docusate Sodium (COLACE PO) Take 400 mg by mouth every evening    Yes Historical Provider, MD   metoprolol (TOPROL XL) 50 MG XL tablet Take 50 mg by mouth nightly    Yes Historical Provider, MD   celecoxib (CELEBREX) 200 MG capsule Take 1 capsule by mouth daily 12/5/19   Guy Jaimes MD   rosuvastatin (CRESTOR) 20 MG tablet Take 20 mg by mouth every evening  12/11/13   Historical Provider, MD   ezetimibe (ZETIA) 10 MG tablet Take 10 mg by mouth daily Indications: takes 1-2 times per week     Historical Provider, MD   aspirin 81 MG tablet Take 81 mg by mouth daily    Historical Provider, MD   XARELTO 20 MG TABS tablet edema  Neurologic: Mental status: Alert, oriented, thought content appropriate     No results for input(s): WBC, HGB, PLT in the last 72 hours. No results for input(s): NA, K, CL, CO2, BUN, CREATININE, GLUCOSE, AST, ALT, ALB, BILITOT, ALKPHOS in the last 72 hours. Troponin T: No results for input(s): TROPONINI in the last 72 hours. ABGs: No results found for: PHART, PO2ART, TEL2CWQ  INR: No results for input(s): INR in the last 72 hours. URINALYSIS:No results for input(s): NITRITE, COLORU, PHUR, LABCAST, WBCUA, RBCUA, MUCUS, TRICHOMONAS, YEAST, BACTERIA, CLARITYU, SPECGRAV, LEUKOCYTESUR, UROBILINOGEN, BILIRUBINUR, BLOODU, GLUCOSEU, AMORPHOUS in the last 72 hours. Invalid input(s): Cinthia Chan  -----------------------------------------------------------------   Fluoro For Surgical Procedures    Result Date: 3/27/2020  Patient MRN: [id number] : 1950 Age:  69 years Gender: Male Order Date: 3/27/2020 8:59 AM. Exam: FLUORO FOR SURGICAL PROCEDURES Number of Views: 2 Indication:  Lumbar microdecompression Comparison: MRI lumbar spine 2020 Findings: 2 images obtained. Spinal needle noted superficial posterior elements L3-L4 motion segment level. Impression:  Fluoroscopic time 8.0 seconds. Spinal lead positioning as above. Assessment and Plan     *Status post discectomy. *Hypertension. *Diabetes. *Obesity. *History of paroxysmal A. fib. Currently in sinus rhythm. Plan:  Continue his preadmission home medications. Surgical team is to address his back surgery related issues including good ambulation status, wound care, DVT prophylaxis and other related issues. Patient would need to follow-up with his primary care doctor and endocrinologist.  Berdie Shed will be resumed only if cleared by spine surgery team.    Thank you Dr Kirk Covington for this consultation. Please call me anytime if needed.     Patient Active Problem List   Diagnosis Code    Obesity E66.9    Pancytopenia ([facility] S73.479  Hypertension I10    Hyperlipidemia E78.5    CAD (coronary artery disease) I25.10    ED (erectile dysfunction) N52.9    GERD (gastroesophageal reflux disease) K21.9    Type 2 diabetes mellitus with diabetic polyneuropathy, with long-term current use of insulin ([facility]) E11.42, Z79.4    Hyperopia of both eyes with astigmatism and presbyopia H52.03, H52.203, H52.4    Nuclear sclerotic cataract of right eye H25.11    Hyperactivity of bladder N31.8    Paroxysmal atrial fibrillation ([facility]) I48.0    DM w/ coma type I, uncontrolled ([facility]) E10.69, E10.65    Microalbuminuria due to type 1 diabetes mellitus ([facility]) E10.29, R80.9    Chronic progressive paraparesis ([facility]) G82.20    Type 1 diabetes mellitus on insulin therapy ([facility] E10.9    Neuropathy G62.9    Lumbar stenosis with neurogenic claudication M48.062       Shay Suazo MD  Admitting Hospitalist    TTS: 85mins where I focused more than 75% of my attention on rendering care, and planning treatment course for this patient, in addition to talking to RN team, mid levels, consulting with other physicians and following up on labs and imaging. High Risk Readmission Screening Tool Score Noted.      Emergency Contact:

## 2020-04-02 ENCOUNTER — TELEPHONE (OUTPATIENT)
Dept: ENDOCRINOLOGY | Age: 70
End: 2020-04-02

## 2020-04-24 RX ORDER — INSULIN PUMP CONTROLLER
EACH MISCELLANEOUS
Qty: 30 EACH | Refills: 3 | Status: SHIPPED | OUTPATIENT
Start: 2020-04-24 | End: 2020-07-07

## 2020-04-29 ENCOUNTER — VIRTUAL VISIT (OUTPATIENT)
Dept: ENDOCRINOLOGY | Age: 70
End: 2020-04-29
Payer: MEDICARE

## 2020-04-29 PROCEDURE — 99442 PR PHYS/QHP TELEPHONE EVALUATION 11-20 MIN: CPT | Performed by: INTERNAL MEDICINE

## 2020-04-29 NOTE — PROGRESS NOTES
87    POC Glucose Latest Ref Range: 60 - 115 mg/dl  174 (H)   Calcium Latest Ref Range: 8.5 - 9.9 mg/dL 8.3 (L)    Hemoglobin A1C Latest Ref Range: 4.8 - 5.9 % 7.5 (H)    WBC Latest Ref Range: 4.8 - 10.8 K/uL 7.8    RBC Latest Ref Range: 4.70 - 6.10 M/uL 3.85 (L)    Hemoglobin Quant Latest Ref Range: 14.0 - 18.0 g/dL 12.3 (L)    Hematocrit Latest Ref Range: 42.0 - 52.0 % 36.2 (L)    MCV Latest Ref Range: 80.0 - 100.0 fL 94.2    MCH Latest Ref Range: 27.0 - 31.3 pg 32.1 (H)    MCHC Latest Ref Range: 33.0 - 37.0 % 34.1    RDW Latest Ref Range: 11.5 - 14.5 % 13.3    Platelet Count Latest Ref Range: 130 - 400 K/uL 154    Neutrophils % Latest Units: % 70.5    Lymphocyte % Latest Units: % 15.8    Monocytes % Latest Units: % 12.7    Eosinophils % Latest Units: % 0.5    Basophils % Latest Units: % 0.5    Neutrophils Absolute Latest Ref Range: 1.4 - 6.5 K/uL 5.5    Lymphocytes Absolute Latest Ref Range: 1.0 - 4.8 K/uL 1.2    Monocytes Absolute Latest Ref Range: 0.2 - 0.8 K/uL 1.0 (H)    Eosinophils Absolute Latest Ref Range: 0.0 - 0.7 K/uL 0.0    Basophils Absolute Latest Ref Range: 0.0 - 0.2 K/uL 0.0          Review of Systems    Prior to Visit Medications    Medication Sig Taking? Authorizing Provider   Insulin Disposable Pump (OMNIPOD DASH 5 PACK) MISC Change every 72 hrs  Will Cabello MD   insulin NPH (NOVOLIN N) 100 UNIT/ML injection vial 30 units before breakfast, 40 units with supper, 15 units at bed time  Will Cabello MD   insulin lispro (HUMALOG) 100 UNIT/ML injection vial Inject 25 Units into the skin 3 times daily (before meals) Indications: at lunch time  Historical Provider, MD   pregabalin (LYRICA) 150 MG capsule Take 1 capsule by mouth 3 times daily for 90 days.   Dav Thomas MD   blood glucose test strips (ONETOUCH VERIO) strip USE ONE STRIP TO CHECK GLUCOSE FIVE TIMES DAILY AS NEEDED  Karena Franklin MD   insulin aspart (NOVOLOG) 100 UNIT/ML injection vial Inject 25 Units into the skin 3 times daily (before meals) Indications: Sliding scale in AM and HS  Patient taking differently: Inject 25 Units into the skin 3 times daily (before meals) Indications: Sliding scale in AM and HS Took 12 units this AM  Dequan Stallings MD   insulin regular (NOVOLIN R) 100 UNIT/ML injection Inject SC tid per sliding scale up to 100 units daily prn .00  Patient taking differently: Inject 40 Units into the skin every evening Indications: at supper / breakfast 30-35 units Inject SC tid per sliding scale up to 100 units daily prn .00  Dequan Stallings MD   DULoxetine (CYMBALTA) 60 MG extended release capsule One by mouth daily  Patient taking differently: every evening One by mouth daily  Kaci Ivy MD   acetaminophen (TYLENOL) 500 MG tablet Take 1,000 mg by mouth every 6 hours as needed for Pain  Historical Provider, MD   metFORMIN (GLUCOPHAGE) 1000 MG tablet Take 1 tablet by mouth 2 times daily (with meals)  Kaci Ivy MD   celecoxib (CELEBREX) 200 MG capsule Take 1 capsule by mouth daily  Kaci Ivy MD   tadalafil (CIALIS) 5 MG tablet Take 1 tablet by mouth daily  Kaci Ivy MD   mirabegron (MYRBETRIQ) 50 MG TB24 Take 50 mg by mouth daily  Kaci Ivy MD   Continuous Blood Gluc  (FREESTYLE NUBIA 14 DAY READER) XU As directed  Dequan Stallings MD   Continuous Blood Gluc Sensor (FREESTYLE NUBIA 14 DAY SENSOR) [facility] Every 2 weeks  Will Cabello MD   Beclomethasone Diprop Monohyd (BECONASE AQ NA) by Nasal route 2 times daily   Historical Provider, MD   polyethylene glycol (GLYCOLAX) powder Take 17 g by mouth daily  Historical Provider, MD   rosuvastatin (CRESTOR) 20 MG tablet Take 20 mg by mouth every evening   Historical Provider, MD   IRBESARTAN PO Take 300 mg by mouth every morning Take 1/2 tablet BID  Historical Provider, MD   ezetimibe (ZETIA) 10 MG tablet Take 10 mg by mouth daily Indications: takes 1-2 times per week   Historical Provider, MD   Blood Glucose Monitoring Suppl XU Test five times daily  Hayley Webb MD   Lancets MISC Test five times daily. Hayley Webb MD   aspirin 81 MG tablet Take 81 mg by mouth daily  Historical Provider, MD   Insulin Syringes, Disposable, U-100 1 ML MISC Use qid for dosing insulin as directed. Dx 250.00  Hayley Webb MD   glucose monitoring kit (FREESTYLE) monitoring kit 1 kit by Does not apply route daily Free style lite  Hayley Webb MD   [facility] INSULIN SYRINGE 30G X 1/2\" 1 ML MISC USE 4 TIMES DAILY FOR DOSING INSULIN AS DIRECTED  Hayley Webb MD   XARELTO 20 MG TABS tablet   Historical Provider, MD   Docusate Sodium (COLACE PO) Take 400 mg by mouth every evening   Historical Provider, MD   metoprolol (TOPROL XL) 50 MG XL tablet Take 50 mg by mouth nightly   Historical Provider, MD       Social History     Tobacco Use    Smoking status: Never Smoker    Smokeless tobacco: Never Used   Substance Use Topics    Alcohol use: Yes     Alcohol/week: 1.0 - 2.0 standard drinks     Types: 1 - 2 Glasses of wine per week     Frequency: 4 or more times a week     Drinks per session: 1 or 2     Binge frequency: Less than monthly     Comment: social    Drug use:  No            PHYSICAL EXAMINATION:  [ INSTRUCTIONS:  \"[x]\" Indicates a positive item  \"[]\" Indicates a negative item  -- DELETE ALL ITEMS NOT EXAMINED]  [] Alert  [] Oriented to person/place/time    [] No apparent distress  [] Toxic appearing    [] Face flushed appearing [] Sclera clear  [] Lips are cyanotic      [] Breathing appears normal  [] Appears tachypneic      [] Rash on visible skin    [] Cranial Nerves II-XII grossly intact    [] Motor grossly intact in visible upper extremities    [] Motor grossly intact in visible lower extremities    [] Normal Mood  [] Anxious appearing    [] Depressed appearing  [] Confused appearing      [] Poor short term memory  [] Poor long term memory    [] OTHER:      Due to this being a TeleHealth encounter, evaluation of the following organ systems is limited:

## 2020-05-04 RX ORDER — FLASH GLUCOSE SCANNING READER
EACH MISCELLANEOUS
Qty: 1 DEVICE | Refills: 0 | Status: SHIPPED | OUTPATIENT
Start: 2020-05-04 | End: 2021-07-23 | Stop reason: SDUPTHER

## 2020-05-04 RX ORDER — FLASH GLUCOSE SENSOR
KIT MISCELLANEOUS
Qty: 2 EACH | Refills: 6 | Status: SHIPPED
Start: 2020-05-04 | End: 2020-07-07 | Stop reason: SDUPTHER

## 2020-05-12 ENCOUNTER — HOSPITAL ENCOUNTER (OUTPATIENT)
Dept: PHYSICAL THERAPY | Age: 70
Setting detail: THERAPIES SERIES
Discharge: HOME | End: 2020-05-12
Payer: MEDICARE

## 2020-05-12 PROCEDURE — 97163 PT EVAL HIGH COMPLEX 45 MIN: CPT

## 2020-05-12 PROCEDURE — 97110 THERAPEUTIC EXERCISES: CPT

## 2020-05-12 PROCEDURE — 97116 GAIT TRAINING THERAPY: CPT

## 2020-05-12 ASSESSMENT — PAIN DESCRIPTION - FREQUENCY: FREQUENCY: INTERMITTENT

## 2020-05-12 ASSESSMENT — PAIN DESCRIPTION - LOCATION: LOCATION: BACK

## 2020-05-12 ASSESSMENT — PAIN DESCRIPTION - ORIENTATION: ORIENTATION: RIGHT;LEFT

## 2020-05-12 ASSESSMENT — PAIN SCALES - GENERAL: PAINLEVEL_OUTOF10: 0

## 2020-05-12 ASSESSMENT — PAIN DESCRIPTION - DESCRIPTORS: DESCRIPTORS: ACHING;BURNING

## 2020-05-12 ASSESSMENT — PAIN DESCRIPTION - PAIN TYPE: TYPE: SURGICAL PAIN;CHRONIC PAIN

## 2020-05-15 ENCOUNTER — HOSPITAL ENCOUNTER (OUTPATIENT)
Dept: PHYSICAL THERAPY | Age: 70
Setting detail: THERAPIES SERIES
Discharge: HOME | End: 2020-05-15
Payer: MEDICARE

## 2020-05-15 PROCEDURE — 97116 GAIT TRAINING THERAPY: CPT

## 2020-05-15 PROCEDURE — 97110 THERAPEUTIC EXERCISES: CPT

## 2020-05-15 PROCEDURE — 97112 NEUROMUSCULAR REEDUCATION: CPT

## 2020-05-15 NOTE — PROGRESS NOTES
term goal 4: Oswestry in regards to endurance <= 30% disability  Long term goal 5: Pt with standing HEP independence for continued strengthening and endurance for function  Patient Goals   Patient goals : \"I need to get stronger and have better endurance and balance so I don't fall and get back to normal. \"    Plan:  2x week for 4-5 weeks or 10 visits  Focus strength, balance, gait and stairs           Therapy Time   Individual Concurrent Group Co-treatment   Time In  905         Time Out  1005         Minutes  [address] [place] [address], [id number]

## 2020-05-20 ENCOUNTER — HOSPITAL ENCOUNTER (OUTPATIENT)
Dept: PHYSICAL THERAPY | Age: 70
Setting detail: THERAPIES SERIES
Discharge: HOME | End: 2020-05-20
Payer: MEDICARE

## 2020-05-20 PROCEDURE — 97112 NEUROMUSCULAR REEDUCATION: CPT

## 2020-05-20 PROCEDURE — 97116 GAIT TRAINING THERAPY: CPT

## 2020-05-20 PROCEDURE — 97140 MANUAL THERAPY 1/> REGIONS: CPT

## 2020-05-20 PROCEDURE — 97110 THERAPEUTIC EXERCISES: CPT

## 2020-05-20 ASSESSMENT — PAIN DESCRIPTION - ORIENTATION: ORIENTATION: RIGHT

## 2020-05-20 ASSESSMENT — PAIN DESCRIPTION - LOCATION: LOCATION: BACK

## 2020-05-20 ASSESSMENT — PAIN DESCRIPTION - DIRECTION: RADIATING_TOWARDS: RIGHT LOWER FLANK

## 2020-05-20 ASSESSMENT — PAIN DESCRIPTION - PAIN TYPE: TYPE: SURGICAL PAIN;CHRONIC PAIN

## 2020-05-20 ASSESSMENT — PAIN DESCRIPTION - FREQUENCY: FREQUENCY: INTERMITTENT

## 2020-05-20 ASSESSMENT — PAIN DESCRIPTION - DESCRIPTORS: DESCRIPTORS: ACHING;BURNING

## 2020-05-20 ASSESSMENT — PAIN SCALES - GENERAL: PAINLEVEL_OUTOF10: 1

## 2020-05-22 ENCOUNTER — HOSPITAL ENCOUNTER (OUTPATIENT)
Dept: PHYSICAL THERAPY | Age: 70
Setting detail: THERAPIES SERIES
Discharge: HOME | End: 2020-05-22
Payer: MEDICARE

## 2020-05-22 PROCEDURE — 97116 GAIT TRAINING THERAPY: CPT

## 2020-05-22 PROCEDURE — 97112 NEUROMUSCULAR REEDUCATION: CPT

## 2020-05-22 PROCEDURE — 97110 THERAPEUTIC EXERCISES: CPT

## 2020-05-22 ASSESSMENT — PAIN DESCRIPTION - LOCATION: LOCATION: BACK

## 2020-05-22 ASSESSMENT — PAIN DESCRIPTION - DESCRIPTORS: DESCRIPTORS: ACHING;SORE

## 2020-05-22 ASSESSMENT — PAIN DESCRIPTION - ORIENTATION: ORIENTATION: RIGHT

## 2020-05-22 ASSESSMENT — PAIN DESCRIPTION - FREQUENCY: FREQUENCY: INTERMITTENT

## 2020-05-22 ASSESSMENT — PAIN DESCRIPTION - PAIN TYPE: TYPE: SURGICAL PAIN;CHRONIC PAIN

## 2020-05-22 ASSESSMENT — PAIN DESCRIPTION - DIRECTION: RADIATING_TOWARDS: RIGHT LOWER FLANK

## 2020-05-22 NOTE — PROGRESS NOTES
1  Surface: level tile;carpet  Device: No Device  Other Apparatus: (defers prefab AFO use from previous PT- feet slap but clear )  Assistance: Stand by assistance  Quality of Gait: more foot slap than last trial, but quicker pace, mild to moderate Trendelenburg; educated more improtant to do better gait for less pressure on low back than to rush and increase distance  Distance: 440ft  Comments: 2 min 57 seconds to complete        Balance  Sitting - Static: Good  Sitting - Dynamic: Good          Exercises  Exercise 1: bridging with ball for add/IR x 10   Exercise 2: SLR with abdominal bracing 3 hold 2x5 alternating left and right in sets of 5  Exercise 3: seated hamstring stretch 30sec x 2left and right   Exercise 4:  sit to stand to sit 2x5 reps, obtaining balance each time, control sit \"no plop\"  Exercise 7: sidestepping heel leading left and right 20 feet x2 (better control and use of glut med)  Exercise 8: backwards walking 1UE on rail 2x 40ft with SBA  Exercise 9: scissor bridge 1 pause each motion 2x5 reps  Exercise 10: sideliying hip abd 2x10 reps with 2 hold left and right  Exercise 11: hamstring 90/9-00  60 sec x 1 PROM by PT, ()/() left -30deg psot stretch ( -42 deg) at start with mild spasming, right -28deg psot stretch ( -33 deg at start )   Exercise 12: standing alt hip ext x10 each leg, B UE support  Exercise 13: standing alt toe raises x2-3 hold, very weak L worse than R, x10 reps each leg alternating BUE support  Exercise 14: bridging with ball 3 hold x 10 reps  Exercise 15: hooklying alt hip flex to 90deg  with abdominal bracing  x10 each leg        Assessment:   Conditions Requiring Skilled Therapeutic Intervention  Body structures, Functions, Activity limitations: Decreased functional mobility ; Decreased endurance;Decreased balance;Decreased strength  Assessment: Pt with greater strength and better ambulation contorl this date. Pt with limited ability to do HEP as too busy at work.  Encouraged pt to

## 2020-05-27 ENCOUNTER — HOSPITAL ENCOUNTER (OUTPATIENT)
Dept: PHYSICAL THERAPY | Age: 70
Setting detail: THERAPIES SERIES
Discharge: HOME | End: 2020-05-27
Payer: MEDICARE

## 2020-05-27 PROCEDURE — 97110 THERAPEUTIC EXERCISES: CPT

## 2020-05-27 PROCEDURE — 97116 GAIT TRAINING THERAPY: CPT

## 2020-05-29 ENCOUNTER — HOSPITAL ENCOUNTER (OUTPATIENT)
Dept: PHYSICAL THERAPY | Age: 70
Setting detail: THERAPIES SERIES
Discharge: HOME | End: 2020-05-29
Payer: MEDICARE

## 2020-05-29 PROCEDURE — 97112 NEUROMUSCULAR REEDUCATION: CPT

## 2020-05-29 PROCEDURE — 97110 THERAPEUTIC EXERCISES: CPT

## 2020-05-29 PROCEDURE — 97116 GAIT TRAINING THERAPY: CPT

## 2020-05-29 NOTE — PROGRESS NOTES
Physical Therapy  Daily Treatment Note  Date: 2020  Patient Name: Mliss Fothergill  MRN: [id number]     :   1950    Subjective:   General  Referring Practitioner: Dr Cece Maurer   PT Visit Information  Onset Date: 20(surgery date)  Total # of Visits Approved: 10  Total # of Visits to Date: 6  Plan of Care/Certification Expiration Date: 20  No Show: 0  Canceled Appointment: 0  Subjective  Subjective: Pt reports no falls since last visit. Even went through the medd and took more time wihtout incident. Pain Screening  Patient Currently in Pain: No(didn't take tylenol this AM either 20)  Vital Signs  Patient Currently in Pain: No(didn't take tylenol this AM either 20)  Patient Observation  Observations: Pt seems steadier overall and reports feels like balance is getting better.        Treatment Activities:   Manual therapy  Other: no c/o or need for balance at this time      Ambulation  Ambulation?: Yes  More Ambulation?: Yes  Ambulation 1  Surface: level tile;carpet  Device: No Device  Assistance: Supervision;Modified Independent  Quality of Gait: good balance, increased pace, upright posture, increased foot slap started at approx 350ft  Distance: 440 ft   Comments: 2min 37 sec to complete, mild SOB and mask in place, only 4 minute4 rest after 2 flights of stairs prior to walk attempt,, burning R flanke to 2/10  Stairs/Curb  Stairs?: Yes  Stairs  # Steps : 20  Stairs Height: 6\"  Rails: Left ascending  Device: No Device  Assistance: Modified independent   Comment: reciprical up, descending wtih some recipirical and some marked time due to R ankle limited ROM and pain from long time issue, mask makes it more difficuult; no rest; RPE 5/10 post, mild SOB post     Exercises  Exercise 3: seated hamstring stretch 30sec x 3 left and right   Exercise 5: standing at rail 2figner hold on rail each handalt hip ext 3 hold x 10 reps each leg  Exercise 6: standing alt hip ext with BUE  2finger each hand support 5hold x10 reps each leg   Exercise 7: sidestepping without UE support 40ftx2 wtih CGA/SBA and gait belt minor LOB self corrected  Exercise 8: backwards walking gait belt no UE support with CGA and cues to slightly bend fwd to keep balance and not use PT for support, more fatigue and more difficult to balance post walk versus sidestepping  Exercise 9: tandem walk with one rail 60ft CGA- challenging  Exercise 10: squat walk 50 ft SBA, not very challenging- no flank c/o  Exercise 11: 100ft and 50ft cone test x16 cones with tight turns, no LOB, challenging,   Exercise 12: 50ft 8 cone squat  test, one loss of balance with Min A to correct, educated to go slow with lifting from floor-- pt states very aware of strength adn going slowly at work. Assessment:   Conditions Requiring Skilled Therapeutic Intervention  Body structures, Functions, Activity limitations: Decreased functional mobility ; Decreased endurance;Decreased balance;Decreased strength  Assessment: Pt able to handle more challenging neuro re educateion / gait activites this date. Making gains in balance, gait and endurance. WIll continue to advacne per plan. Pt reprots not really having time for HEP as working too much- 10 hour days. Occasional c/o burning in R flank- resolves with rest. No need for ice post. No need for KT tape. Will continue per plan.    Treatment Diagnosis: Lumbar Stenosis with neurogenic claudication postlumbar surgery with bone spur removal on 3/27/20  Prognosis: Good  REQUIRES PT FOLLOW UP: Yes  Activity Tolerance  Activity Tolerance: Patient Tolerated treatment well      Goals:  Short term goals  Time Frame for Short term goals: 2 weeks  Short term goal 1: Pt reporting HEP at least 1x daily 6/7 days   Short term goal 2: Pt able to initiate standing MICHAEL with UE support in sets of 5 reps for increasing strength and endurance  Short term goal 3: Pt able to walk 300ft before fatigued with more upright posture  Short term goal 4: Assess stairs with 1 rial x 10 steps reciprically with mild fatigue post   Short term goal 5: Pt able to ride recumbant bike 5 minutes before fatigued or increased symptoms. Long term goals  Time Frame for Long term goals : 4-5 weeks   Long term goal 1: Pt able to complete 6 minute walk test at >= 500ft with less than or equal to one standing rest   Long term goal 2: Improve gait with increased upright posture and decreased foot slap in gait trials, pt reporting no falls at home . Long term goal 3: Increase B LE strength to 4+/5 hips, >=4/5 ankle DF so that pt can ambulate safely with or without an AD  Long term goal 4: Oswestry in regards to endurance <= 30% disability  Long term goal 5: Pt with standing HEP independence for continued strengthening and endurance for function  Patient Goals   Patient goals : \"I need to get stronger and have better endurance and balance so I don't fall and get back to normal. \"    Plan:   Continue 2x week per plan , advance, gait, endurance, and balance as tolerated.            Therapy Time   Individual Concurrent Group Co-treatment   Time In  900         Time Out  1000         Minutes  [address] [place] [address]

## 2020-06-03 ENCOUNTER — HOSPITAL ENCOUNTER (OUTPATIENT)
Dept: PHYSICAL THERAPY | Age: 70
Setting detail: THERAPIES SERIES
Discharge: HOME | End: 2020-06-03
Payer: MEDICARE

## 2020-06-03 PROCEDURE — 97112 NEUROMUSCULAR REEDUCATION: CPT

## 2020-06-03 PROCEDURE — 97116 GAIT TRAINING THERAPY: CPT

## 2020-06-03 PROCEDURE — 97110 THERAPEUTIC EXERCISES: CPT

## 2020-06-03 ASSESSMENT — PAIN SCALES - GENERAL: PAINLEVEL_OUTOF10: 0

## 2020-06-03 ASSESSMENT — PAIN DESCRIPTION - DESCRIPTORS: DESCRIPTORS: ACHING

## 2020-06-03 ASSESSMENT — PAIN DESCRIPTION - LOCATION: LOCATION: BACK

## 2020-06-03 ASSESSMENT — PAIN DESCRIPTION - ORIENTATION: ORIENTATION: RIGHT

## 2020-06-03 ASSESSMENT — PAIN DESCRIPTION - FREQUENCY: FREQUENCY: INTERMITTENT

## 2020-06-05 ENCOUNTER — HOSPITAL ENCOUNTER (OUTPATIENT)
Dept: PHYSICAL THERAPY | Age: 70
Setting detail: THERAPIES SERIES
Discharge: HOME | End: 2020-06-05
Payer: MEDICARE

## 2020-06-05 PROCEDURE — 97116 GAIT TRAINING THERAPY: CPT

## 2020-06-05 PROCEDURE — 97110 THERAPEUTIC EXERCISES: CPT

## 2020-06-05 PROCEDURE — 97112 NEUROMUSCULAR REEDUCATION: CPT

## 2020-06-05 ASSESSMENT — PAIN SCALES - GENERAL: PAINLEVEL_OUTOF10: 2

## 2020-06-05 ASSESSMENT — PAIN DESCRIPTION - LOCATION: LOCATION: BACK

## 2020-06-05 ASSESSMENT — PAIN DESCRIPTION - PAIN TYPE: TYPE: SURGICAL PAIN

## 2020-06-05 ASSESSMENT — PAIN DESCRIPTION - ORIENTATION: ORIENTATION: RIGHT

## 2020-06-05 NOTE — PROGRESS NOTES
No device  Assistance 2: Supervision;Stand by assistance  Distance: 130' 16 stairs and 79' prior to seated rest   Stairs/Curb  Stairs?: Yes  Stairs  # Steps : 16  Stairs Height: 6\"  Rails: Right ascending  Device: No Device  Assistance: Modified independent   Comment: reciprical up nonreciprical down use of rail inc going up inc R flank pain. Exercises  Exercise 1: bridge x 15 hold 3 sec   Exercise 2: seated toe raises 2 x10 hold 5 sec   Exercise 3: SLR x 10 hold 5 sec B VC for DF   Exercise 4: standing BUE support at rail hip ext x 10 hold 5 sec   Exercise 5: standing at rail BUE support lightly hip  ABD x 10 hold 5   Exercise 6: standing march BUE light support on rail x 10 hold 5 sec    Exercise 7: sit<>stands from low plinth no hands eccentric control only lightly tapping surface. Able to complete 9 reps and then fatigued had to rest.    Exercise 11: toe tapping cones x 16 knocking one over use of rail for one UE CGA   Exercise 12: 15' squat to  4 cones shakey and difficulty to stand erect no LOB SBA   Exercise 13: sidestepping over cones at rail R and L 4 cones x 2 ea                                   Assessment:   Conditions Requiring Skilled Therapeutic Intervention  Body structures, Functions, Activity limitations: Decreased functional mobility ; Decreased endurance;Decreased balance;Decreased strength  Assessment: Pt. with increased fatigue noted during gait trial this date although imporved sara foot slap bagan approx 100' R > L. However able to continue lap with no LOB. Mild inc in R flank pain non alternating therex able to continue with  inc holds . One LOB able to correct when standing from plinth. \"My foot got stuck. \"  Able to  continue to focus strenth and balance fatigue post but no pain.     Treatment Diagnosis: Lumbar Stenosis with neurogenic claudication postlumbar surgery with bone spur removal on 3/27/20  Prognosis: Good  REQUIRES PT FOLLOW UP: Yes  Activity Minutes  [address] [facility], PTA  License and [address] Number: [id number]

## 2020-06-10 ENCOUNTER — HOSPITAL ENCOUNTER (OUTPATIENT)
Dept: PHYSICAL THERAPY | Age: 70
Setting detail: THERAPIES SERIES
Discharge: HOME | End: 2020-06-10
Payer: MEDICARE

## 2020-06-10 ENCOUNTER — TELEPHONE (OUTPATIENT)
Dept: FAMILY MEDICINE CLINIC | Age: 70
End: 2020-06-10

## 2020-06-10 PROCEDURE — 97530 THERAPEUTIC ACTIVITIES: CPT

## 2020-06-10 PROCEDURE — 97110 THERAPEUTIC EXERCISES: CPT

## 2020-06-10 PROCEDURE — 97116 GAIT TRAINING THERAPY: CPT

## 2020-06-10 ASSESSMENT — PAIN DESCRIPTION - FREQUENCY: FREQUENCY: INTERMITTENT

## 2020-06-10 ASSESSMENT — PAIN DESCRIPTION - LOCATION: LOCATION: BACK

## 2020-06-10 ASSESSMENT — PAIN DESCRIPTION - DESCRIPTORS: DESCRIPTORS: ACHING

## 2020-06-10 ASSESSMENT — PAIN SCALES - GENERAL: PAINLEVEL_OUTOF10: 2

## 2020-06-10 ASSESSMENT — PAIN DESCRIPTION - ORIENTATION: ORIENTATION: RIGHT

## 2020-06-10 ASSESSMENT — PAIN DESCRIPTION - PAIN TYPE: TYPE: SURGICAL PAIN;CHRONIC PAIN

## 2020-06-10 NOTE — PROGRESS NOTES
endurance and LE strenth toa chichancee all LTG, recommend continue 2x week for the next 4-5 weeks to acheive all goals. Treatment Diagnosis: Lumbar Stenosis with neurogenic claudication postlumbar surgery with bone spur removal on 3/27/20  Prognosis: Good  Decision Making: Medium Complexity  REQUIRES PT FOLLOW UP: Yes  Activity Tolerance  Activity Tolerance: Patient Tolerated treatment well(endurance limitations also)      Goals:  Short term goals  Time Frame for Short term goals: 2 weeks  Short term goal 1: Pt reporting HEP at least 1x daily 6/7 days (6/10 hamstring and ankle DF dailiy + work instead )  Short term goal 2: Pt able to initiate standing MICHAEL with UE support in sets of 5 reps for increasing strength and endurance(met 6/10/20)  Short term goal 3: Pt able to walk 300ft before fatigued with more upright posture- GOAL MET (met 6/10/20)  Short term goal 4: Assess stairs with 1 rail x 10 steps reciprically with mild fatigue post (previoulsy tested = met 6/10/20)  Short term goal 5: Pt able to ride recumbant bike 5 minutes before fatigued or increased symptoms. (NT bike not available 6/10/20)  Long term goals  Time Frame for Long term goals : 8-10 weeks(increased at recheck 6/10/20)  Long term goal 1: Pt able to complete 6 minute walk test at >= 500ft with less than or equal to one standing rest (only able to complete one lap =440ft; in progress 6/10/20)  Long term goal 2: Improve gait with increased upright posture and decreased foot slap in gait trials, pt reporting no falls at home .(gait continuing to improve P Met 6/10/20)  Long term goal 3:  Increase B LE strength to 4+/5 hips, >=4/5 ankle DF so that pt can ambulate safely with or without an AD(improving P Met 6/10/20)  Long term goal 4: Oswestry in regards to endurance <= 30% disability(Oswestry 23/50 = 46 % disability , P Met 6/10/20 more active)  Long term goal 5: Pt with standing HEP independence for continued strengthening and endurance for function(pt

## 2020-06-11 RX ORDER — PREGABALIN 150 MG/1
150 CAPSULE ORAL 3 TIMES DAILY
Qty: 270 CAPSULE | Refills: 0 | OUTPATIENT
Start: 2020-06-11 | End: 2020-09-09

## 2020-06-11 NOTE — TELEPHONE ENCOUNTER
Because this is a controlled substance patient needs an appointment. Once appointment is scheduled we can send back to keira. He can do this from the comfort of his home virtually or over the phone.

## 2020-06-12 ENCOUNTER — HOSPITAL ENCOUNTER (OUTPATIENT)
Dept: PHYSICAL THERAPY | Age: 70
Setting detail: THERAPIES SERIES
Discharge: HOME | End: 2020-06-12
Payer: MEDICARE

## 2020-06-12 PROCEDURE — 97110 THERAPEUTIC EXERCISES: CPT

## 2020-06-12 PROCEDURE — 97116 GAIT TRAINING THERAPY: CPT

## 2020-06-12 ASSESSMENT — PAIN DESCRIPTION - DESCRIPTORS: DESCRIPTORS: ACHING

## 2020-06-12 ASSESSMENT — PAIN DESCRIPTION - LOCATION: LOCATION: SHOULDER

## 2020-06-12 ASSESSMENT — PAIN DESCRIPTION - PAIN TYPE: TYPE: ACUTE PAIN

## 2020-06-12 ASSESSMENT — PAIN SCALES - GENERAL: PAINLEVEL_OUTOF10: 1

## 2020-06-12 ASSESSMENT — PAIN DESCRIPTION - ORIENTATION: ORIENTATION: LEFT

## 2020-06-17 ENCOUNTER — VIRTUAL VISIT (OUTPATIENT)
Dept: FAMILY MEDICINE CLINIC | Age: 70
End: 2020-06-17
Payer: MEDICARE

## 2020-06-17 ENCOUNTER — HOSPITAL ENCOUNTER (OUTPATIENT)
Dept: PHYSICAL THERAPY | Age: 70
Setting detail: THERAPIES SERIES
Discharge: HOME | End: 2020-06-17
Payer: MEDICARE

## 2020-06-17 PROBLEM — G63 POLYNEUROPATHY ASSOCIATED WITH UNDERLYING DISEASE (HCC): Status: ACTIVE | Noted: 2020-06-17

## 2020-06-17 PROCEDURE — 3051F HG A1C>EQUAL 7.0%<8.0%: CPT | Performed by: FAMILY MEDICINE

## 2020-06-17 PROCEDURE — 99214 OFFICE O/P EST MOD 30 MIN: CPT | Performed by: FAMILY MEDICINE

## 2020-06-17 PROCEDURE — 97116 GAIT TRAINING THERAPY: CPT

## 2020-06-17 PROCEDURE — 97110 THERAPEUTIC EXERCISES: CPT

## 2020-06-17 RX ORDER — PREGABALIN 150 MG/1
150 CAPSULE ORAL 3 TIMES DAILY
Qty: 270 CAPSULE | Refills: 0 | Status: SHIPPED | OUTPATIENT
Start: 2020-06-17 | End: 2020-09-28

## 2020-06-17 ASSESSMENT — ENCOUNTER SYMPTOMS
NAUSEA: 0
CHEST TIGHTNESS: 0
BLOOD IN STOOL: 0
DIARRHEA: 0
ANAL BLEEDING: 0
VOMITING: 0
ABDOMINAL PAIN: 0
COUGH: 0
SHORTNESS OF BREATH: 0
CONSTIPATION: 0
WHEEZING: 0

## 2020-06-17 ASSESSMENT — PATIENT HEALTH QUESTIONNAIRE - PHQ9
1. LITTLE INTEREST OR PLEASURE IN DOING THINGS: 0
SUM OF ALL RESPONSES TO PHQ QUESTIONS 1-9: 0
SUM OF ALL RESPONSES TO PHQ QUESTIONS 1-9: 0
SUM OF ALL RESPONSES TO PHQ9 QUESTIONS 1 & 2: 0
2. FEELING DOWN, DEPRESSED OR HOPELESS: 0

## 2020-06-17 ASSESSMENT — PAIN DESCRIPTION - PAIN TYPE: TYPE: ACUTE PAIN

## 2020-06-17 ASSESSMENT — PAIN SCALES - GENERAL: PAINLEVEL_OUTOF10: 1

## 2020-06-17 ASSESSMENT — PAIN DESCRIPTION - LOCATION: LOCATION: SHOULDER

## 2020-06-17 ASSESSMENT — PAIN DESCRIPTION - DESCRIPTORS: DESCRIPTORS: ACHING

## 2020-06-17 ASSESSMENT — PAIN DESCRIPTION - ORIENTATION: ORIENTATION: LEFT

## 2020-06-17 NOTE — PROGRESS NOTES
100 UNIT/ML injection Inject SC tid per sliding scale up to 100 units daily prn .00  Patient taking differently: Inject 40 Units into the skin every evening Indications: at supper / breakfast 30-35 units Inject SC tid per sliding scale up to 100 units daily prn .00  Breakfast 30, supper 40, 20 Yes Dequan Stallings MD   DULoxetine (CYMBALTA) 60 MG extended release capsule One by mouth daily  Patient taking differently: every evening One by mouth daily Yes Kaci Ivy MD   acetaminophen (TYLENOL) 500 MG tablet Take 1,000 mg by mouth every 6 hours as needed for Pain Yes Historical Provider, MD   celecoxib (CELEBREX) 200 MG capsule Take 1 capsule by mouth daily Yes Kaci Ivy MD   tadalafil (CIALIS) 5 MG tablet Take 1 tablet by mouth daily Yes Kaci Ivy MD   mirabegron (MYRBETRIQ) 50 MG TB24 Take 50 mg by mouth daily Yes Kaci Ivy MD   Beclomethasone Diprop Monohyd (BECONASE AQ NA) by Nasal route 2 times daily  Yes Historical Provider, MD   polyethylene glycol (GLYCOLAX) powder Take 17 g by mouth daily Yes Historical Provider, MD   rosuvastatin (CRESTOR) 20 MG tablet Take 20 mg by mouth every evening  Yes Historical Provider, MD   IRBESARTAN PO Take 300 mg by mouth every morning Take 1/2 tablet BID Yes Historical Provider, MD   ezetimibe (ZETIA) 10 MG tablet Take 10 mg by mouth daily Indications: takes 1-2 times per week  Yes Historical Provider, MD   Blood Glucose Monitoring Suppl XU Test five times daily Yes Dayton Toussaint MD   Lancets MISC Test five times daily. Yes Dayton Toussaint MD   aspirin 81 MG tablet Take 81 mg by mouth daily Yes Historical Provider, MD   Insulin Syringes, Disposable, U-100 1 ML MISC Use qid for dosing insulin as directed.  Dx 250.00 Yes Dayton Toussaint MD   glucose monitoring kit (FREESTYLE) monitoring kit 1 kit by Does not apply route daily Free style lite Yes MD Bel Dumontolette Sprung INSULIN SYRINGE 30G X 1/2\" 1 ML MISC USE 4 TIMES DAILY FOR DOSING

## 2020-06-19 ENCOUNTER — HOSPITAL ENCOUNTER (OUTPATIENT)
Dept: PHYSICAL THERAPY | Age: 70
Setting detail: THERAPIES SERIES
Discharge: HOME | End: 2020-06-19
Payer: MEDICARE

## 2020-06-19 PROCEDURE — 97116 GAIT TRAINING THERAPY: CPT

## 2020-06-19 PROCEDURE — 97110 THERAPEUTIC EXERCISES: CPT

## 2020-06-19 PROCEDURE — 97112 NEUROMUSCULAR REEDUCATION: CPT

## 2020-06-24 ENCOUNTER — HOSPITAL ENCOUNTER (OUTPATIENT)
Dept: PHYSICAL THERAPY | Age: 70
Setting detail: THERAPIES SERIES
Discharge: HOME | End: 2020-06-24
Payer: MEDICARE

## 2020-06-24 PROCEDURE — 97110 THERAPEUTIC EXERCISES: CPT

## 2020-06-24 PROCEDURE — 97116 GAIT TRAINING THERAPY: CPT

## 2020-06-24 ASSESSMENT — PAIN DESCRIPTION - FREQUENCY: FREQUENCY: INTERMITTENT

## 2020-06-24 ASSESSMENT — PAIN DESCRIPTION - DESCRIPTORS: DESCRIPTORS: ACHING

## 2020-06-24 ASSESSMENT — PAIN DESCRIPTION - LOCATION: LOCATION: ANKLE

## 2020-06-24 ASSESSMENT — PAIN DESCRIPTION - ORIENTATION: ORIENTATION: RIGHT

## 2020-06-24 ASSESSMENT — PAIN DESCRIPTION - PAIN TYPE: TYPE: CHRONIC PAIN

## 2020-06-24 NOTE — PROGRESS NOTES
Physical Therapy  Daily Treatment Note  Date: 2020  Patient Name: Abhishek Han  MRN: [id number]     :   1950    Treatment Diagnosis: Lumbar Stenosis with neurogenic claudication postlumbar surgery with bone spur removal on 3/27/20     Restrictions  Position Activity Restriction  Other position/activity restrictions: pt reports per surgeon- listen to back and do what can tolerate    Subjective:   General  Chart Reviewed: Yes  Additional Pertinent Hx: Pt reports surgery 3/27/20 to low back L2-5- removed bone spurs, no hardware, pt reports hasn't used cane since surgery, one episode 3 weeks postop with LLE going numb and limp- no fall, cautious since then with distance, able to ride tractor for an acre of grass  Family / Caregiver Present: No  Referring Practitioner: Dr Florentino Valdes   PT Visit Information  Onset Date: 20(surgery date)  Total # of Visits Approved: 19(9+10=19)  Total # of Visits to Date: 15  Plan of Care/Certification Expiration Date: 07/10/20  No Show: 0  Canceled Appointment: 0  Subjective  Subjective: Pt. states he had a rough day yesterday working alot. Pt. states he has swelling in R ankle with pain level walking 1-2/10. Pain Screening  Patient Currently in Pain: Yes  Pain Assessment  Pain Assessment: 0-10  Pain Level: (1-2)  Pain Type: Chronic pain  Pain Location: Ankle  Pain Orientation: Right  Pain Descriptors: Aching(swollen )  Pain Frequency: Intermittent  Vital Signs  Patient Currently in Pain: Yes       Treatment Activities:                  Ambulation  Ambulation?: Yes  Ambulation 1  Surface: level tile;carpet  Device: No Device  Other Apparatus: (pt defers use of prefab AFO to decrease foot slap)  Assistance: Supervision;Modified Independent  Quality of Gait: foot slap around 120 ft.  Inc postural sway   no reaching for walls but ambulation nearby wall one standing RB approx 20 sec   Distance: 880'   Comments: 6 min 800' , 6 min 40 sec 880' WC follow for safety )  Long term goals  Time Frame for Long term goals : 8-10 weeks(increased at recheck 6/10/20)  Long term goal 1: Pt able to complete 6 minute walk test at >= 500ft with less than or equal to one standing rest (GOAL ' in 6 min 1 standing RB )  Long term goal 2: Improve gait with increased upright posture and decreased foot slap in gait trials, pt reporting no falls at home .(gait continuing to improve P Met 6/10/20)  Long term goal 3: Increase B LE strength to 4+/5 hips, >=4/5 ankle DF so that pt can ambulate safely with or without an AD(improving P Met 6/10/20)  Long term goal 4: Oswestry in regards to endurance <= 30% disability(Oswestry 23/50 = 46 % disability , P Met 6/10/20 more active)  Long term goal 5: Pt with standing HEP independence for continued strengthening and endurance for function(pt able to do but incosistent stand with HEP 6/10/20)  Patient Goals   Patient goals : \"I need to get stronger and have better endurance and balance so I don't fall and get back to normal. \"(6/10/20 in progress)    Plan:      continue 2x week for 4-5 more weeks to increase functional gait, endurance and strength while decreasing R low back flank c/o.         Therapy Time   Individual Concurrent Group Co-treatment   Time In  900         Time Out  [address]  License and [address] [address] Number: [id number]

## 2020-06-26 ENCOUNTER — HOSPITAL ENCOUNTER (OUTPATIENT)
Dept: PHYSICAL THERAPY | Age: 70
Setting detail: THERAPIES SERIES
Discharge: HOME | End: 2020-06-26
Payer: MEDICARE

## 2020-06-26 PROCEDURE — 97116 GAIT TRAINING THERAPY: CPT

## 2020-06-26 PROCEDURE — 97110 THERAPEUTIC EXERCISES: CPT

## 2020-06-26 PROCEDURE — 97112 NEUROMUSCULAR REEDUCATION: CPT

## 2020-06-26 ASSESSMENT — PAIN DESCRIPTION - LOCATION: LOCATION: ANKLE

## 2020-06-26 ASSESSMENT — PAIN DESCRIPTION - PAIN TYPE: TYPE: CHRONIC PAIN

## 2020-06-26 ASSESSMENT — PAIN DESCRIPTION - ORIENTATION: ORIENTATION: RIGHT

## 2020-06-26 NOTE — PROGRESS NOTES
Physical Therapy  Daily Treatment Note  Date: 2020  Patient Name: Mliss Fothergill  MRN: [id number]     :   1950    Treatment Diagnosis: Lumbar Stenosis with neurogenic claudication postlumbar surgery with bone spur removal on 3/27/20     Restrictions  Position Activity Restriction  Other position/activity restrictions: pt reports per surgeon- listen to back and do what can tolerate    Subjective:   General  Chart Reviewed: Yes  Additional Pertinent Hx: Pt reports surgery 3/27/20 to low back L2-5- removed bone spurs, no hardware, pt reports hasn't used cane since surgery, one episode 3 weeks postop with LLE going numb and limp- no fall, cautious since then with distance, able to ride tractor for an acre of grass  Family / Caregiver Present: No  Referring Practitioner: Dr Cece Maurer   PT Visit Information  Onset Date: 20(surgery date)  Total # of Visits Approved: 19(9+10=19)  Total # of Visits to Date: 15  Plan of Care/Certification Expiration Date: 07/10/20  No Show: 0  Canceled Appointment: 0  Subjective  Subjective: Pt. states he had to walk very narrow in his garden between rows. \"I just take my time. \"  Pt. denies falls or LOB. Pt. states he was able to go out on biat on Wednesday. Pt. rpeorts R ankle pain currently. \"swollen\"  Pt. states he was able to walk down floating dock and around on the boat. Pain Screening  Patient Currently in Pain: Yes  Pain Assessment  Pain Assessment: 0-10  Pain Level: (1-2)  Pain Type: Chronic pain  Pain Location: Ankle  Pain Orientation: Right  Vital Signs  Patient Currently in Pain: Yes       Treatment Activities:                  Ambulation  Ambulation?: Yes  Ambulation 1  Surface: level tile;carpet  Device: No Device  Other Apparatus: (pt defers use of prefab AFO to decrease foot slap)  Assistance: Supervision;Modified Independent  Quality of Gait: foot slap around 120 ft.  Inc postural sway   no reaching for walls but ambulation nearby wall   Distance:

## 2020-07-07 ENCOUNTER — HOSPITAL ENCOUNTER (OUTPATIENT)
Dept: LAB | Age: 70
Discharge: HOME | End: 2020-07-07
Payer: MEDICARE

## 2020-07-07 ENCOUNTER — OFFICE VISIT (OUTPATIENT)
Dept: FAMILY MEDICINE CLINIC | Age: 70
End: 2020-07-07
Payer: MEDICARE

## 2020-07-07 VITALS
DIASTOLIC BLOOD PRESSURE: 72 MMHG | RESPIRATION RATE: 14 BRPM | WEIGHT: 250.2 LBS | HEART RATE: 62 BPM | TEMPERATURE: 96.5 F | SYSTOLIC BLOOD PRESSURE: 124 MMHG | HEIGHT: 72 IN | BODY MASS INDEX: 33.89 KG/M2

## 2020-07-07 PROBLEM — E10.42 POLYNEUROPATHY DUE TO TYPE 1 DIABETES MELLITUS (HCC): Status: ACTIVE | Noted: 2020-07-07

## 2020-07-07 LAB
ALBUMIN SERPL-MCNC: 4.1 G/DL (ref 3.5–4.6)
ALP BLD-CCNC: 78 U/L (ref 35–104)
ALT SERPL-CCNC: 20 U/L (ref 0–41)
ANION GAP SERPL CALCULATED.3IONS-SCNC: 12 MEQ/L (ref 9–15)
AST SERPL-CCNC: 25 U/L (ref 0–40)
BASOPHILS ABSOLUTE: 0 K/UL (ref 0–0.2)
BASOPHILS RELATIVE PERCENT: 0.8 %
BILIRUB SERPL-MCNC: 0.4 MG/DL (ref 0.2–0.7)
BUN BLDV-MCNC: 15 MG/DL (ref 8–23)
CALCIUM SERPL-MCNC: 9.5 MG/DL (ref 8.5–9.9)
CHLORIDE BLD-SCNC: 104 MEQ/L (ref 95–107)
CHOLESTEROL, TOTAL: 126 MG/DL (ref 0–199)
CO2: 24 MEQ/L (ref 20–31)
CREAT SERPL-MCNC: 0.89 MG/DL (ref 0.7–1.2)
CREATININE URINE: 55.5 MG/DL
EOSINOPHILS ABSOLUTE: 0.2 K/UL (ref 0–0.7)
EOSINOPHILS RELATIVE PERCENT: 3.6 %
GFR AFRICAN AMERICAN: >60
GFR NON-AFRICAN AMERICAN: >60
GLOBULIN: 2.5 G/DL (ref 2.3–3.5)
GLUCOSE BLD-MCNC: 133 MG/DL (ref 70–99)
HBA1C MFR BLD: 7.4 % (ref 4.8–5.9)
HCT VFR BLD CALC: 41.1 % (ref 42–52)
HDLC SERPL-MCNC: 49 MG/DL (ref 40–59)
HEMOGLOBIN: 13.5 G/DL (ref 14–18)
LDL CHOLESTEROL CALCULATED: 55 MG/DL (ref 0–129)
LYMPHOCYTES ABSOLUTE: 1.3 K/UL (ref 1–4.8)
LYMPHOCYTES RELATIVE PERCENT: 29.5 %
MCH RBC QN AUTO: 30.3 PG (ref 27–31.3)
MCHC RBC AUTO-ENTMCNC: 32.9 % (ref 33–37)
MCV RBC AUTO: 92 FL (ref 80–100)
MICROALBUMIN UR-MCNC: 3.3 MG/DL
MICROALBUMIN/CREAT UR-RTO: 59.5 MG/G (ref 0–30)
MONOCYTES ABSOLUTE: 0.4 K/UL (ref 0.2–0.8)
MONOCYTES RELATIVE PERCENT: 10.1 %
NEUTROPHILS ABSOLUTE: 2.4 K/UL (ref 1.4–6.5)
NEUTROPHILS RELATIVE PERCENT: 56 %
PDW BLD-RTO: 14.5 % (ref 11.5–14.5)
PLATELET # BLD: 167 K/UL (ref 130–400)
POTASSIUM SERPL-SCNC: 5.3 MEQ/L (ref 3.4–4.9)
RBC # BLD: 4.47 M/UL (ref 4.7–6.1)
SODIUM BLD-SCNC: 140 MEQ/L (ref 135–144)
TOTAL PROTEIN: 6.6 G/DL (ref 6.3–8)
TRIGL SERPL-MCNC: 109 MG/DL (ref 0–150)
WBC # BLD: 4.3 K/UL (ref 4.8–10.8)

## 2020-07-07 PROCEDURE — 80053 COMPREHEN METABOLIC PANEL: CPT

## 2020-07-07 PROCEDURE — 82043 UR ALBUMIN QUANTITATIVE: CPT

## 2020-07-07 PROCEDURE — G0438 PPPS, INITIAL VISIT: HCPCS | Performed by: FAMILY MEDICINE

## 2020-07-07 PROCEDURE — 85025 COMPLETE CBC W/AUTO DIFF WBC: CPT

## 2020-07-07 PROCEDURE — 80061 LIPID PANEL: CPT

## 2020-07-07 PROCEDURE — 36415 COLL VENOUS BLD VENIPUNCTURE: CPT

## 2020-07-07 PROCEDURE — 82570 ASSAY OF URINE CREATININE: CPT

## 2020-07-07 PROCEDURE — 3051F HG A1C>EQUAL 7.0%<8.0%: CPT | Performed by: FAMILY MEDICINE

## 2020-07-07 PROCEDURE — 83036 HEMOGLOBIN GLYCOSYLATED A1C: CPT

## 2020-07-07 RX ORDER — PREGABALIN 150 MG/1
150 CAPSULE ORAL 3 TIMES DAILY
Qty: 270 CAPSULE | Refills: 5 | Status: CANCELLED | OUTPATIENT
Start: 2020-07-07 | End: 2020-10-05

## 2020-07-07 RX ORDER — CLINDAMYCIN HYDROCHLORIDE 300 MG/1
CAPSULE ORAL
COMMUNITY
Start: 2020-06-10

## 2020-07-07 ASSESSMENT — LIFESTYLE VARIABLES
HOW OFTEN DO YOU HAVE SIX OR MORE DRINKS ON ONE OCCASION: 0
HOW OFTEN DURING THE LAST YEAR HAVE YOU BEEN UNABLE TO REMEMBER WHAT HAPPENED THE NIGHT BEFORE BECAUSE YOU HAD BEEN DRINKING: 0
HOW OFTEN DURING THE LAST YEAR HAVE YOU HAD A FEELING OF GUILT OR REMORSE AFTER DRINKING: 0
AUDIT TOTAL SCORE: 3
HAS A RELATIVE, FRIEND, DOCTOR, OR ANOTHER HEALTH PROFESSIONAL EXPRESSED CONCERN ABOUT YOUR DRINKING OR SUGGESTED YOU CUT DOWN: 0
HOW OFTEN DURING THE LAST YEAR HAVE YOU NEEDED AN ALCOHOLIC DRINK FIRST THING IN THE MORNING TO GET YOURSELF GOING AFTER A NIGHT OF HEAVY DRINKING: 0
HOW OFTEN DO YOU HAVE A DRINK CONTAINING ALCOHOL: 3
HAVE YOU OR SOMEONE ELSE BEEN INJURED AS A RESULT OF YOUR DRINKING: 0
AUDIT-C TOTAL SCORE: 3
HOW MANY STANDARD DRINKS CONTAINING ALCOHOL DO YOU HAVE ON A TYPICAL DAY: 0
HOW OFTEN DURING THE LAST YEAR HAVE YOU FOUND THAT YOU WERE NOT ABLE TO STOP DRINKING ONCE YOU HAD STARTED: 0
HOW OFTEN DURING THE LAST YEAR HAVE YOU FAILED TO DO WHAT WAS NORMALLY EXPECTED FROM YOU BECAUSE OF DRINKING: 0

## 2020-07-07 ASSESSMENT — PATIENT HEALTH QUESTIONNAIRE - PHQ9
SUM OF ALL RESPONSES TO PHQ QUESTIONS 1-9: 0
SUM OF ALL RESPONSES TO PHQ QUESTIONS 1-9: 0

## 2020-07-07 NOTE — PATIENT INSTRUCTIONS
Personalized Preventive Plan for [facility] - 7/7/2020  Medicare offers a range of preventive health benefits. Some of the tests and screenings are paid in full while other may be subject to a deductible, co-insurance, and/or copay. Some of these benefits include a comprehensive review of your medical history including lifestyle, illnesses that may run in your family, and various assessments and screenings as appropriate. After reviewing your medical record and screening and assessments performed today your provider may have ordered immunizations, labs, imaging, and/or referrals for you. A list of these orders (if applicable) as well as your Preventive Care list are included within your After Visit Summary for your review. Other Preventive Recommendations:    · A preventive eye exam performed by an eye specialist is recommended every 1-2 years to screen for glaucoma; cataracts, macular degeneration, and other eye disorders. · A preventive dental visit is recommended every 6 months. · Try to get at least 150 minutes of exercise per week or 10,000 steps per day on a pedometer . · Order or download the FREE \"Exercise & Physical Activity: Your Everyday Guide\" from [employer]. Call 7-578.958.9123 or search [employer] online. · You need 9644-4597 mg of calcium and 5136-2198 IU of vitamin D per day. It is possible to meet your calcium requirement with diet alone, but a vitamin D supplement is usually necessary to meet this goal.  · When exposed to the sun, use a sunscreen that protects against both UVA and UVB radiation with an SPF of 30 or greater. Reapply every 2 to 3 hours or after sweating, drying off with a towel, or swimming. · Always wear a seat belt when traveling in a car. Always wear a helmet when riding a bicycle or motorcycle. Heart-Healthy Diet   Sodium, Fat, and Cholesterol Controlled Diet       What Is a Heart Healthy Diet?    A heart-healthy diet is one that limits sodium , certain types of fat , and cholesterol . This type of diet is recommended for:   People with any form of cardiovascular disease (eg, coronary heart disease , peripheral vascular disease , previous heart attack , previous stroke )   People with risk factors for cardiovascular disease, such as high blood pressure , high cholesterol , or diabetes   Anyone who wants to lower their risk of developing cardiovascular disease   Sodium    Sodium is a mineral found in many foods. In general, most people consume much more sodium than they need. Diets high in sodium can increase blood pressure and lead to edema (water retention). On a heart-healthy diet, you should consume no more than 2,300 mg (milligrams) of sodium per dayabout the amount in one teaspoon of table salt. The foods highest in sodium include table salt (about 50% sodium), processed foods, convenience foods, and preserved foods. Cholesterol    Cholesterol is a fat-like, waxy substance in your blood. Our bodies make some cholesterol. It is also found in animal products, with the highest amounts in fatty meat, egg yolks, whole milk, cheese, shellfish, and organ meats. On a heart-healthy diet, you should limit your cholesterol intake to less than 200 mg per day. It is normal and important to have some cholesterol in your bloodstream. But too much cholesterol can cause plaque to build up within your arteries, which can eventually lead to a heart attack or stroke. The two types of cholesterol that are most commonly referred to are:   Low-density lipoprotein (LDL) cholesterol  Also known as bad cholesterol, this is the cholesterol that tends to build up along your arteries. Bad cholesterol levels are increased by eating fats that are saturated or hydrogenated. Optimal level of this cholesterol is less than 100. Over 130 starts to get risky for heart disease.    High-density lipoprotein (HDL) cholesterol  Also known as good cholesterol, this type of cholesterol actually carries cholesterol away from your arteries and may, therefore, help lower your risk of having a heart attack. You want this level to be high (ideally greater than 60). It is a risk to have a level less than 40. You can raise this good cholesterol by eating olive oil, canola oil, avocados, or nuts. Exercise raises this level, too. Fat    Fat is calorie dense and packs a lot of calories into a small amount of food. Even though fats should be limited due to their high calorie content, not all fats are bad. In fact, some fats are quite healthful. Fat can be broken down into four main types. The good-for-you fats are:   Monounsaturated fat  found in oils such as olive and canola, avocados, and nuts and natural nut butters; can decrease cholesterol levels, while keeping levels of HDL cholesterol high   Polyunsaturated fat  found in oils such as safflower, sunflower, soybean, corn, and sesame; can decrease total cholesterol and LDL cholesterol   Omega-3 fatty acids  particularly those found in fatty fish (such as salmon, trout, tuna, mackerel, herring, and sardines); can decrease risk of arrhythmias, decrease triglyceride levels, and slightly lower blood pressure   The fats that you want to limit are:   Saturated fat  found in animal products, many fast foods, and a few vegetables; increases total blood cholesterol, including LDL levels   Animal fats that are saturated include: butter, lard, whole-milk dairy products, meat fat, and poultry skin   Vegetable fats that are saturated include: hydrogenated shortening, palm oil, coconut oil, cocoa butter   Hydrogenated or trans fat  found in margarine and vegetable shortening, most shelf stable snack foods, and fried foods; increases LDL and decreases HDL     It is generally recommended that you limit your total fat for the day to less than 30% of your total calories.  If you follow an 1800-calorie heart healthy diet, for example, this would mean 60 grams of fat or less per day. Saturated fat and trans fat in your diet raises your blood cholesterol the most, much more than dietary cholesterol does. For this reason, on a heart-healthy diet, less than 7% of your calories should come from saturated fat and ideally 0% from trans fat. On an 1800-calorie diet, this translates into less than 14 grams of saturated fat per day, leaving 46 grams of fat to come from mono- and polyunsaturated fats.    Food Choices on a Heart Healthy Diet   Food Category   Foods Recommended   Foods to Avoid   Grains   Breads and rolls without salted tops Most dry and cooked cereals Unsalted crackers and breadsticks Low-sodium or homemade breadcrumbs or stuffing All rice and pastas   Breads, rolls, and crackers with salted tops High-fat baked goods (eg, muffins, donuts, pastries) Quick breads, self-rising flour, and biscuit mixes Regular bread crumbs Instant hot cereals Commercially prepared rice, pasta, or stuffing mixes   Vegetables   Most fresh, frozen, and low-sodium canned vegetables Low-sodium and salt-free vegetable juices Canned vegetables if unsalted or rinsed   Regular canned vegetables and juices, including sauerkraut and pickled vegetables Frozen vegetables with sauces Commercially prepared potato and vegetable mixes   Fruits   Most fresh, frozen, and canned fruits All fruit juices   Fruits processed with salt or sodium   Milk   Nonfat or low-fat (1%) milk Nonfat or low-fat yogurt Cottage cheese, low-fat ricotta, cheeses labeled as low-fat and low-sodium   Whole milk Reduced-fat (2%) milk Malted and chocolate milk Full fat yogurt Most cheeses (unless low-fat and low salt) Buttermilk (no more than 1 cup per week)   Meats and Beans   Lean cuts of fresh or frozen beef, veal, lamb, or pork (look for the word loin) Fresh or frozen poultry without the skin Fresh or frozen fish and some shellfish Egg whites and egg substitutes (Limit whole eggs to three per week) Tofu Nuts or seeds (unsalted, dry-roasted), low-sodium peanut butter Dried peas, beans, and lentils   Any smoked, cured, salted, or canned meat, fish, or poultry (including gimenez, chipped beef, cold cuts, hot dogs, sausages, sardines, and anchovies) Poultry skins Breaded and/or fried fish or meats Canned peas, beans, and lentils Salted nuts   Fats and Oils   Olive oil and canola oil Low-sodium, low-fat salad dressings and mayonnaise   Butter, margarine, coconut and palm oils, gimenez fat   Snacks, Sweets, and Condiments   Low-sodium or unsalted versions of broths, soups, soy sauce, and condiments Pepper, herbs, and spices; vinegar, lemon, or lime juice Low-fat frozen desserts (yogurt, sherbet, fruit bars) Sugar, cocoa powder, honey, syrup, jam, and preserves Low-fat, trans-fat free cookies, cakes, and pies Héctor and animal crackers, fig bars, johnny snaps   High-fat desserts Broth, soups, gravies, and sauces, made from instant mixes or other high-sodium ingredients Salted snack foods Canned olives Meat tenderizers, seasoning salt, and most flavored vinegars   Beverages   Low-sodium carbonated beverages Tea and coffee in moderation Soy milk   Commercially softened water   Suggestions   Make whole grains, fruits, and vegetables the base of your diet. Choose heart-healthy fats such as canola, olive, and flaxseed oil, and foods high in heart-healthy fats, such as nuts, seeds, soybeans, tofu, and fish. Eat fish at least twice per week; the fish highest in omega-3 fatty acids and lowest in mercury include salmon, herring, mackerel, sardines, and canned chunk light tuna. If you eat fish less than twice per week or have high triglycerides, talk to your doctor about taking fish oil supplements. Read food labels.    For products low in fat and cholesterol, look for fat free, low-fat, cholesterol free, saturated fat free, and trans fat freeAlso scan the Nutrition Facts Label, which lists saturated fat, trans fat, 30 or greater is considered to be obese  A waist circumference greater than 35 inches (88 cm) in women and 40 inches (102 cm) in men increases the risk of obesity-related complications, such as heart disease and diabetes. People who are obese and who have a larger waist size may need more aggressive weight loss treatment than others. Talk to your doctor or nurse for advice. Types of treatment -- Based on your measurements and your medical history, your doctor or nurse can determine what combination of weight loss treatments would work best for you. Treatments may include changes in lifestyle, exercise, dieting, and, in some cases, weight loss medicines or weight loss surgery. Weight loss surgery, also called bariatric surgery, is reserved for people with severe obesity who have not responded to other weight loss treatments. SETTING A WEIGHT LOSS GOAL -- It is important to set a realistic weight loss goal. Your first goal should be to avoid gaining more weight and staying at your current weight (or within 5 percent). Many people have a \"dream\" weight that is difficult or impossible to achieve. People at high risk of developing diabetes who are able to lose 5 percent of their body weight and maintain this weight will reduce their risk of developing diabetes by about 50 percent and reduce their blood pressure. This is a success. Losing more than 15 percent of your body weight and staying at this weight is an extremely good result, even if you never reach your \"dream\" or \"ideal\" weight. LIFESTYLE CHANGES -- Programs that help you to change your lifestyle are usually run by psychologists or other professionals. The goals of lifestyle changes are to help you change your eating habits, become more active, and be more aware of how much you eat and exercise, helping you to make healthier choices. This type of treatment can be broken down into three steps:   The triggers that make you want to eat   Eating   What happens after you eat  Triggers to eat -- Determining what triggers you to eat involves figuring out what foods you eat and where and when you eat. To figure out what triggers you to eat, keep a record for a few days of everything you eat, the places where you eat, how often you eat, and the emotions you were feeling when you ate. For some people, the trigger is related to a certain time of day or night. For others, the trigger is related to a certain place, like sitting at a desk working. Eating -- You can change your eating habits by breaking the chain of events between the trigger for eating and eating itself. There are many ways to do this. For instance, you can:  Limit where you eat to a few places (eg, dining room)   Restrict the number of utensils (eg, only a fork) used for eating   Drink a sip of water between each bite   Chew your food a certain number of times   Get up and stop eating every few minutes  What happens after you eat -- Rewarding yourself for good eating behaviors can help you to develop better habits. This is not a reward for weight loss; instead, it is a reward for changing unhealthy behaviors. Do not use food as a reward. Some people find money, clothing, or personal care (eg, a hair cut, manicure, or massage) to be effective rewards. Treat yourself immediately after making better eating choices to reinforce the value of the good behavior. You need to have clear behavior goals, and you must have a time frame for reaching your goals. Reward small changes along the way to your final goal.  Other factors that contribute to successful weight loss -- Changing your behavior involves more than just changing unhealthy eating habits; it also involves finding people around you to support your weight loss, reducing stress, and learning to be strong when tempted by food. Establish a \"siddhartha\" system -- Having a friend or family member available to provide support and reinforce good behavior is very helpful. The support person needs to understand your goals. Learn to be strong -- Learning to be strong when tempted by food is an important part of losing weight. As an example, you will need to learn how to say \"no\" and continue to say no when urged to eat at parties and social gatherings. Develop strategies for events before you go, such as eating before you go or taking low-calorie snacks and drinks with you. Develop a support system -- Having a support system is helpful when losing weight. This is why many [employer] groups are successful. Family support is also essential; if your family does not support your efforts to lose weight, this can slow your progress or even keep you from losing weight. Positive thinking -- People often have conversations with themselves in their head; these conversations can be positive or negative. If you eat a piece of cake that was not planned, you may respond by thinking, \"Oh, you stupid idiot, you've blown your diet! \" and as a result, you may eat more cake. A positive thought for the same event could be, \"Well, I ate cake when it was not on my plan. Now I should do something to get back on track. \" A positive approach is much more likely to be successful than a negative one. Reduce stress -- Although stress is a part of everyday life, it can trigger uncontrolled eating in some people. It is important to find a way to get through these difficult times without eating or by eating low-calorie food, like raw vegetables. It may be helpful to imagine a relaxing place that allows you to temporarily escape from stress. With deep breaths and closed eyes, you can imagine this relaxing place for a few minutes. Self-help programs -- Self-help programs like W[employer] Dru Watchers®, Overeaters Anonymous®, and Take Off Una (TOPS)© work for some people.  As with all weight loss programs, you are most likely to be successful with these plans if you make long-term changes in how you eat.  CHOOSING A DIET -- A calorie is a unit of energy found in food. Your body needs calories to function. The goal of any diet is to burn up more calories than you eat. How quickly you lose weight depends upon several factors, such as your age, gender, and starting weight. Older people have a slower metabolism than young people, so they lose weight more slowly. Men lose more weight than women of similar height and weight when dieting because they use more energy. People who are extremely overweight lose weight more quickly than those who are only mildly overweight. Try not to drink alcohol or drinks with added sugar, and most sweets (candy, cakes, cookies), since they rarely contain important nutrients. Portion-controlled diets -- One simple way to diet is to buy packaged foods, like frozen low-calorie meals or meal-replacement canned drinks. A typical meal plan for one day may include:  A meal-replacement drink or breakfast bar for breakfast   A meal-replacement drink or a frozen low-calorie (250 to 350 calories) meal for lunch   A frozen low-calorie meal or other prepackaged, calorie-controlled meal, along with extra vegetables for dinner  This would give you 1000 to 1500 calories per day. Low-fat diet -- To reduce the amount of fat in your diet, you can:  Eat low-fat foods. Low-fat foods are those that contain less than 30 percent of calories from fat. Fat is listed on the food facts label (figure 1). Count fat grams. For a 1500 calorie diet, this would mean about 45 g or fewer of fat per day. Low-carbohydrate diet -- Low- and very-low-carbohydrate diets (eg, Atkins diet, [employer]) have become popular ways to lose weight quickly.   With a very-low-carbohydrate diet, you eat between 0 and 60 grams of carbohydrates per day (a standard diet contains 200 to 300 grams of carbohydrates)   With a low-carbohydrate diet, you eat between 60 and 130 grams of carbohydrates per day  Carbohydrates are found in fruits, vegetables, and grains (including breads, rice, pasta, and cereal), alcoholic beverages, and in dairy products. Meat and fish do not contain carbohydrates. Side effects of very-low-carbohydrate diets can include constipation, headache, bad breath, muscle cramps, diarrhea, and weakness. Mediterranean diet -- The term \"Mediterranean diet\" refers to a way of eating that is common in [place] regions around the [facility]. Although there is some variation in Mediterranean diets, there are some similarities. Most Mediterranean diets include:  A high level of monounsaturated fats (from olive or canola oil, walnuts, pecans, almonds) and a low level of saturated fats (from butter)   A high amount of vegetables, fruits, legumes, and grains (7 to 10 servings of fruits and vegetables per day)   A moderate amount of milk and dairy products, mostly in the form of cheese. Use low-fat dairy products (skim milk, fat-free yogurt, low-fat cheese). A relatively low amount of red meat and meat products. Substitute fish or poultry for red meat. For those who drink alcohol, a modest amount (mainly as red wine) may help to protect against cardiovascular disease. A modest amount is up to one (4 ounce) glass per day for women and up to two glasses per day for men. Which diet is best? -- Studies have compared different diets, including:  Very-low-carbohydrate (Atkins)   Macronutrient balance controlling glycemic load (Zone®)   Reduced-calorie (Weight Watchers®)   Very-low-fat (Ornish)  No one diet is \"best\" for weight loss. Any diet will help you to lose weight if you stick with the diet. Therefore, it is important to choose a diet that includes foods you like. Fad diets -- Fad diets often promise quick weight loss (more than 1 to 2 pounds per week) and may claim that you do not need to exercise or give up favorite foods. Some fad diets cost a lot of money, because you have to pay for seminars or pills. Fad diets generally lack any scientific evidence that they are safe and effective, but instead rely on \"before\" and \"after\" photos or testimonials. Diets that sound too good to be true usually are. These plans are a waste of time and money and are not recommended. A doctor, nurse, or nutritionist can help you find a safe and effective way to lose weight and keep it off. WEIGHT LOSS MEDICINES -- Taking a weight loss medicine may be helpful when used in combination with diet, exercise, and lifestyle changes. However, it is important to understand the risks and benefits of these medicines. It is also important to be realistic about your goal weight using a weight loss medicine; you may not reach your \"dream\" weight, but you may be able to reduce your risk of diabetes or heart disease. Weight loss medicines may be recommended for people who have not been able to lose weight with diet and exercise who have a:  BMI of 30 or more    BMI between 27 and 29.9 and have other medical problems, such as diabetes, high cholesterol, or high blood pressure  Two weight loss medicines are approved in the [facility] for long-term use. These are sibutramine and orlistat. Other weight loss medicines (phentermine, diethylpropion) are available but are only approved for short-term use (up to 12 weeks). Sibutramine -- Sibutramine (Meridia®, Reductil®) is a medicine that reduces your appetite. In people who take the medicine for one year, the average weight loss is 10 percent of the initial body weight (5 percent more than those who took a placebo treatment). Side effects of sibutramine include insomnia, dry mouth, and constipation. Increases in blood pressure can occur. Therefore, blood pressure is usually monitored during treatment. There is no evidence that sibutramine causes heart or lung problems (like dexfenfluramine and fenfluramine (Phen/Fen)).  However, experts agree that sibutramine should not used by people with coronary heart disease, heart failure, uncontrolled hypertension, stroke, irregular heart rhythms, or peripheral vascular disease (poor circulation in the legs). Orlistat -- Orlistat (Xenical® 120 mg capsules) is a medicine that reduces the amount of fat your body absorbs from the foods you eat. A lower-dose version is now available without a prescription (Dread® 60 mg capsules) in many countries, including the [place]. The medicine is recommended three times per day, taken with a meal; you can skip a dose if you skip a meal or if the meal contains no fat. After one year of treatment with orlistat, the average weight loss is approximately 8 to 10 percent of initial body weight (4 percent more than in those who took a placebo). Cholesterol levels often improve, and blood pressure sometimes falls. In people with diabetes, orlistat may help control blood sugar levels. Side effects occur in 15 to 10 percent of people and may include stomach cramps, gas, diarrhea, leakage of stool, or oily stools. These problems are more likely when you take orlistat with a high-fat meal (if more than 30 percent of calories in the meal are from fat). Side effects usually improve as you learn to avoid high-fat foods. Severe liver injury has been reported rarely in patients taking orlistat, but it is not known if orlistat caused the liver problems. Diet supplements -- Diet supplements are widely used by people who are trying to lose weight, although the safety and efficacy of these supplements are often unproven. A few of the more common diet supplements are discussed below; none of these are recommended because they have not been studied carefully, and there is no proof they are safe or effective. Chitosan and wheat dextrin are ineffective for weight loss, and their use is not recommended. Ephedra, a compound related to ephedrine, is no longer available in the [place] due to safety concerns.  Many nonprescription diet pills previously contained ephedra. Although some studies have shown that ephedra helps with weight loss, there can be serious side effects (psychiatric symptoms, palpitations, and stomach upset), including death. There are not enough data about the safety and efficacy of chromium, ginseng, glucomannan, green tea, hydroxycitric acid, L carnitine, psyllium, pyruvate supplements, [place] wort, and conjugated linoleic acid. Two supplements from [facility], [address] [place] (also known as the Randalllavonne Taylor 15 pill) and Herbathin dietary supplement, have been shown to contain prescription drugs. Hoodia gordonii is a dietary supplement derived from a plant in [place]. It is not recommended because there is no proof that it is safe or effective. Bitter orange (Citrus aurantium) can increase your heart rate and blood pressure and is not recommended. SHOULD I HAVE SURGERY TO LOSE WEIGHT? -- Weight loss surgery is recommended ONLY for people with one of the following:  Severe obesity (body mass index above 40) (calculator 1 and calculator 2) who have not responded to diet, exercise, or weight loss medicines   Body mass index between 35 and 40, along with a serious medical problem (including diabetes, severe joint pain, or sleep apnea) that would improve with weight loss  You should be sure that you understand the potential risks and benefits of weight loss surgery. You must be motivated and willing to make lifelong changes in how you eat to reach and maintain a healthier weight after surgery. You must also be realistic about weight loss after surgery (see 'Effectiveness of weight loss surgery' below). PREPARING FOR WEIGHT LOSS SURGERY -- Most people who have weight loss surgery will meet with several specialists before surgery is scheduled. This often includes a dietitian, mental health counselor, a doctor who specializes in care of obese people, and a surgeon who performs weight loss surgery (bariatric surgeon).  You may need to work with these providers for several weeks or months before surgery. The nutritionist will explain what and how much you will be able to eat after surgery. You may also need to lose a small amount of weight before surgery. The mental health specialist will help you to cope with stress and other factors that can make it harder to lose weight or trigger you to eat   The medical doctor will determine whether you need other tests, counseling, or treatment before surgery. He or she might also help you begin a medical weight loss program so that you can lose some weight before surgery. The bariatric surgeon will meet with you to discuss the surgeries available to treat obesity. He or she will also make sure you are a good candidate for surgery. TYPES OF WEIGHT LOSS SURGERY -- There are several types of weight loss surgeries, the most common being lap banding, gastric bypass, and gastric sleeve. Lap banding -- Laparoscopic adjustable gastric banding (LAGB), or lap banding, is a surgery that uses an adjustable band around the opening to the stomach (figure 1). This reduces the amount of food that you can eat at one time. Lap banding is done through small incisions, with a laparoscope. The band can be adjusted after surgery, allowing you to eat more or less food. Adjustments to the size and tightness of the band are made by using a needle to add or remove fluid from a port (a small container under the skin that is connected to the band). Adding fluid to the band makes it tighter which restricts the amount of food you can eat and may help you to lose more weight. Lap banding is a popular choice because it is relatively simple to perform, can be adjusted or removed, and has a low risk of serious complications immediately after surgery. However, weight loss with the lap band depends on your ability to follow the program closely.   You will need to prepare nutritious meals that \"work with\" the band, not against sleeve -- Gastric sleeve, also known as sleeve gastrectomy, is a surgery that reduces the size of the stomach and makes it into a narrow tube (figure 3). The new stomach is much smaller and produces less of the hormone (ghrelin) that causes hunger, helping you feel satisfied with less food. Sleeve gastrectomy is safer than gastric bypass because the intestines are not rearranged, and there is less chance of malnutrition. It also appears to control hunger better than lap banding. It might be safer than the lap banding because no foreign materials are used. The gastric sleeve has a good success rate, and people lose an average of 33 percent of their excess body weight in the first year. For a person who is 120 pounds overweight, this would mean losing about 40 pounds in the first year. WEIGHT LOSS SURGERY COMPLICATIONS -- A variety of complications can occur with weight loss surgery. The risks of surgery depend upon which surgery you have and any medical problems you had before surgery. Some of the more common early surgical complications (one to six weeks after surgery) include:  Bleeding   Infection   Blockage or tear in the bowels   Need for further surgery  Important medical complications after surgery can include blood clots in the legs or lungs, heart attack, pneumonia, and urinary tract infection. Complications are less likely when surgery is performed in centers that are experienced in weight loss surgery. In general, centers with experience in weight loss surgery have:  Board-certified doctors and surgeons   A team of support staff (dietitians, counselors, nurses)   Long-term follow-up after surgery   Hospital staff experienced with the care of weight loss patients. This includes nurses who are trained in the care of patients immediately after surgery and anesthesiologists who are experienced in caring for the morbidly obese.   EFFECTIVENESS OF WEIGHT LOSS SURGERY -- The goal of weight loss surgery is to reduce the risk of illness or death associated with obesity. Weight loss surgery can also help you to feel and look better, reduce the amount of money you spend on medicines, and cut down on sick days. As an example, weight loss surgery can improve health problems related to obesity (diabetes, high blood pressure, high cholesterol, sleep apnea) to the point that you need less or no medicine. Finally, weight loss surgery might reduce your risk of developing heart disease, cancer, and certain infections. AFTER WEIGHT LOSS SURGERY -- You will need to stay in the hospital until your team feels that it is safe for you to leave (on average, one to three days). Do not drive if you are taking prescription pain medicine. Begin exercising as soon as possible once you have healed; most weight loss centers will design an exercise program for you. Once you are home, it is important to eat and drink exactly what your doctor and dietitian recommend. You will see your doctor, nurse, and dietitian on a regular basis after surgery to monitor your health, diet, and weight loss. You will be able to slowly increase how much you eat over time, although it will always be important to:  Eat small, frequent meals and not skip meals   Chew your food slowly and completely   Avoid eating while \"distracted\" (such as eating while watching TV)   Stop eating when you feel full   Drink liquids at least 30 minutes before or after eating   Avoid foods high in fat or sugar   Take vitamin supplements, as recommended  It can take several months to learn to listen to your body so that you know when you are hungry and when you are full. You may dislike foods you previously loved, and you may begin to prefer new foods. This can be a frustrating process for some people, so talk to your dietitian if you are having trouble. It usually takes between one and two years to lose weight after surgery.  After reaching their goal weight, some people have Does your furniture layout leave plenty of space to maneuver between and around chairs, tables, beds, and sofas? Are hallways, stairs and passages between rooms well lit? Can you reach a lamp without getting out of bed? Are floor surfaces well maintained? Shag rugs, high-pile carpeting, tile floors, and polished wood floors can be particularly slippery. Stairs should always have handrails and be carpeted or fitted with a non-skid tread. Is your telephone easily reachable. Is the cord safely tucked away? Room by Room   According to the Association of Aging, bathrooms and shahid are the two most potentially hazardous rooms in your home. In the Kitchen    Be sure your stove is in proper working order and always make sure burners and the oven are off before you go out or go to sleep. Keep pots on the back burners, turn handles away from the front of the stove, and keep stove clean and free of grease build-up. Kitchen ventilation systems and range exhausts should be working properly. Keep flammable objects such as towels and pot holders away from the cooking area except when in use. Make sure kitchen curtains are tied back. Move cords and appliances away from the sink and hot surfaces. If extension cords are needed, install wiring guides so they do not hang over the sink, range, or working areas. Look for coffee pots, kettles and toaster ovens with automatic shut-offs. Keep a mop handy in the kitchen so you can wipe up spills instantly. You should also have a small fire extinguisher. Arrange your kitchen with frequently used items on lower shelves to avoid the need to stand on a stepstool to reach them. Make sure countertops are well-lit to avoid injuries while cutting and preparing food. In the Bathroom    Use a non-slip mat or decals in the tub and shower, since wet, soapy tile or porcelain surfaces are extremely slippery.     Make sure bathroom rugs are non-skid or tape them firmly to the floor. Bathtubs should have at least one, preferably two, grab bars, firmly attached to structural supports in the wall. (Do not use built-in soap holders or glass shower doors as grab bars.)    Tub seats fitted with non-slip material on the legs allow you to wash sitting down. For people with limited mobility, bathtub transfer benches allow you to slide safely into the tub. Raised toilet seats and toilet safety rails are helpful for those with knee or hip problems. In the [facility]    Make sure you use a nightlight and that the area around your bed is clear of potential obstacles. Be careful with electric blankets and never go to sleep with a heating pad, which can cause serious burns even if on a low setting. Use fire-resistant mattress covers and pillows, and NEVER smoke in bed. Keep a phone next to the bed that is programmed to dial 911 at the push of a button. If you have a chronic condition, you may want to sign on with an automatic call-in service. Typically the system includes a small pendant that connects directly to an emergency medical voice-response system. You should also make arrangements to stay in contact with someonefriend, neighbor, family memberon a regular schedule. Fire Prevention   According to the [employer] (Smoke Alarms for Every) [address] [place] [address], senior citizens are one of the two highest risk groups for death and serious injuries due to residential fires. When cooking, wear short-sleeved items, never a bulky long-sleeved robe. The [facility]'s Safety Checklist for Older Consumers emphasizes the importance of checking basements, garages, workshops and storage areas for fire hazards, such as volatile liquids, piles of old rags or clothing and overloaded circuits. Never smoke in bed or when lying down on a couch or recliner chair.     Small portable electric or kerosene heaters are responsible for many home fires and should be used cautiously if at all. If you do use one, be sure to keep them away from flammable materials. In case of fire, make sure you have a pre-established emergency exit plan. Have a professional check your fireplace and other fuel-burning appliances yearly. Helping Hands   Baby boomers entering the philippe years will continue to see the development of new products to help older adults live safely and independently in spite of age-related changes. Making Life More Livable  , by Satya Camara, lists over 1,000 products for \"living well in the mature years,\" such as bathing and mobility aids, household security devices, ergonomically designed knives and peelers, and faucet valves and knobs for temperature control. Medical supply stores and organizations are good sources of information about products that improve your quality of life and insure your safety.      Last Reviewed: November 2009 Clair Shah MD   Updated: 3/7/2011     ·

## 2020-07-07 NOTE — PROGRESS NOTES
100 UNIT/ML injection Inject SC tid per sliding scale up to 100 units daily prn .00  Patient taking differently: Inject 40 Units into the skin every evening Indications: at supper / breakfast 30-35 units Inject SC tid per sliding scale up to 100 units daily prn .00  Breakfast 30, supper 40, 20 Yes Jimi Duran MD   DULoxetine (CYMBALTA) 60 MG extended release capsule One by mouth daily  Patient taking differently: every evening One by mouth daily Yes [place], MD   acetaminophen (TYLENOL) 500 MG tablet Take 1,000 mg by mouth every 6 hours as needed for Pain Yes Historical Provider, MD   celecoxib (CELEBREX) 200 MG capsule Take 1 capsule by mouth daily Yes [place], MD   tadalafil (CIALIS) 5 MG tablet Take 1 tablet by mouth daily Yes Yaakov Noel MD   mirabegron (MYRBETRIQ) 50 MG TB24 Take 50 mg by mouth daily Yes [place], MD   Beclomethasone Diprop Monohyd (BECONASE AQ NA) by Nasal route 2 times daily  Yes Historical Provider, MD   polyethylene glycol (GLYCOLAX) powder Take 17 g by mouth daily Yes Historical Provider, MD   rosuvastatin (CRESTOR) 20 MG tablet Take 20 mg by mouth every evening  Yes Historical Provider, MD   IRBESARTAN PO Take 300 mg by mouth every morning Take 1/2 tablet BID Yes Historical Provider, MD   ezetimibe (ZETIA) 10 MG tablet Take 10 mg by mouth daily Indications: takes 1-2 times per week  Yes Historical Provider, MD   Lancets MISC Test five times daily. Yes Kusum Schaefer MD   aspirin 81 MG tablet Take 81 mg by mouth daily Yes Historical Provider, MD   Insulin Syringes, Disposable, U-100 1 ML MISC Use qid for dosing insulin as directed.  Dx 250.00 Yes Kusum Schaefer MD   Dari [place] INSULIN SYRINGE 30G X 1/2\" 1 ML MISC USE 4 TIMES DAILY FOR DOSING INSULIN AS DIRECTED Yes Kusum Schaefer MD   XARELTO 20 MG TABS tablet  Yes Historical Provider, MD   Docusate Sodium (COLACE PO) Take 400 mg by mouth every evening  Yes Historical Provider, MD   metoprolol (TOPROL XL) 50 MG XL tablet Take 50 mg by mouth nightly  Yes Historical Provider, MD   clindamycin (CLEOCIN) 300 MG capsule TAKE TWO CAPSULES BY MOUTH ONE HOUR BEFORE APPOINTMENT  Historical Provider, MD         Past Medical History:   Diagnosis Date    Arthritis     Bell's palsy 2002    CAD (coronary artery disease)     no stents    DM w/ coma type I, uncontrolled ([address] 10/25/2019    ED (erectile dysfunction)     GERD (gastroesophageal reflux disease)     Herpes zoster 2000    Hyperactivity of bladder 1/10/2019    Hyperlipidemia     meds > 20 yrs    Hyperopia of both eyes with astigmatism and presbyopia 1/6/2017    Hypertension     meds > 20 yrs    Microalbuminuria due to type 1 diabetes mellitus ([address] 12/5/2019    Nuclear sclerotic cataract of right eye 1/6/2017    Obesity     Pancytopenia ([address] 2008    Paroxysmal atrial fibrillation ([address] 9/5/2019    Polyneuropathy due to type 1 diabetes mellitus ([address] 7/7/2020    Type 2 diabetes mellitus with diabetic polyneuropathy, with long-term current use of insulin ([address] 11/14/2017    hx since 1988 / Insulin dependent 1991       Past Surgical History:   Procedure Laterality Date    CARDIAC CATHETERIZATION  2007    CATARACT REMOVAL Left 12/18/2013    Phaco with IOL OS    COLONOSCOPY  2004    DR GUADARRAMA    COLONOSCOPY  01/28/2016    diverticulosis, int hemorrhoids, 5y repeat (DR MANTILLA)    CYST REMOVAL Bilateral 1987    testicular cyst     JOINT REPLACEMENT  2013    LTKR    KNEE ARTHROSCOPY Right 12/2009    LAMINECTOMY N/A 3/27/2020    L 2-3, 3-4, 4-5 MICRODECOMPRESSION performed by Satya Peralta MD at [address] [place] [address]    scrotal    LUMBAR SPINE SURGERY Bilateral 03/27/2020    bilateral L2-L3, L3-L4, L4-L5 microdissection decompression (DR SANCHEZ)    TONSILLECTOMY  1954    TOTAL KNEE ARTHROPLASTY Left 2013    DR OKEEFE         Family History   Problem Relation Age of Onset    Diabetes Mother     High Blood Pressure Mother    Auzl Hedrick High Cholesterol Mother     Heart Disease Mother     Other Mother         recurrent UTI    Cancer Father         esophagus (smoker)    No Known Problems Sister     Diabetes Brother     High Cholesterol Brother     Macular Degen Brother     Heart Attack Maternal Grandfather 76    Diabetes Paternal Grandmother     Heart Attack Paternal Grandfather 71    Thyroid Disease Daughter     Other Daughter         GI & sinus issues    No Known Problems Daughter        CareTeam (Including outside providers/suppliers regularly involved in providing care):   Patient Care Team:  Manjula Lindsay MD as PCP - General (Family Medicine)  Manjula Lindsay MD as PCP - [facility] Empaneled Provider  Natasha Sawyer MD (Gastroenterology)  [facility], DO (Optometry)  Pramod Hurst MD as Consulting Physician (Cardiology)    Wt Readings from Last 3 Encounters:   07/07/20 250 lb 3.2 oz (113.5 kg)   06/11/20 245 lb (111.1 kg)   04/09/20 245 lb (111.1 kg)     Vitals:    07/07/20 0935   BP: 124/72   Site: Left Upper Arm   Position: Sitting   Cuff Size: Large Adult   Pulse: 62   Resp: 14   Temp: 96.5 °F (35.8 °C)   TempSrc: Temporal   Weight: 250 lb 3.2 oz (113.5 kg)   Height: 6' (1.829 m)     Body mass index is 33.93 kg/m². Based upon direct observation of the patient, evaluation of cognition reveals recent and remote memory intact. Patient's complete Health Risk Assessment and screening values have been reviewed and are found in Flowsheets. The following problems were reviewed today and where indicated follow up appointments were made and/or referrals ordered.     Positive Risk Factor Screenings with Interventions:     Fall Risk:  2 or more falls in past year?: (!) yes  Fall with injury in past year?: no  Fall Risk Interventions:    · Home safety tips provided  · Patient is currently in physical therapy for balance    General Health:  General  In general, how would you say your health is?: Good  In the past 7 days, have you experienced any of the following? New or Increased Pain, New or Increased Fatigue, Loneliness, Social Isolation, Stress or Anger?: (!) Loneliness  Do you get the social and emotional support that you need?: Yes  Do you have a Living Will?: (!) No  General Health Risk Interventions:  · Loneliness: related to stay at home orders, manageable stress  · No Living Will: additional information provided from office    Health Habits/Nutrition:  Health Habits/Nutrition  Do you exercise for at least 20 minutes 2-3 times per week?: Yes  Have you lost any weight without trying in the past 3 months?: No  Do you eat fewer than 2 meals per day?: No  Have you seen a dentist within the past year?: Yes  Body mass index is 33.93 kg/m².   Health Habits/Nutrition Interventions:  · Nutritional issues:  educational materials for healthy, well-balanced diet provided, educational materials to promote weight loss provided    Safety:  Safety  Do you have working smoke detectors?: Yes  Have all throw rugs been removed or fastened?: (!) No  Do you have non-slip mats or surfaces in all bathtubs/showers?: (!) No  Do all of your stairways have a railing or banister?: Yes  Are your doorways, halls and stairs free of clutter?: Yes  Do you always fasten your seatbelt when you are in a car?: Yes  Safety Interventions:  · Home safety tips provided    Personalized Preventive Plan   Current Health Maintenance Status  Immunization History   Administered Date(s) Administered    Influenza 10/26/2012, 12/05/2013    Influenza Virus Vaccine 10/10/2010, 10/14/2014, 09/24/2019    Influenza, High Dose (Fluzone 65 yrs and older) 12/04/2015, 10/04/2016, 11/14/2017, 09/20/2018    Pneumococcal Conjugate 13-valent ([id number]) 10/04/2016    Pneumococcal Polysaccharide ([id number]) 10/26/2012, 11/14/2017    Tdap (Boostrix, Adacel) 10/26/2012    Zoster Live (Zostavax) 12/05/2013    Zoster Recombinant (Shingrix) 09/07/2019, 01/30/2020        Health Maintenance [facility])  -     Comprehensive Metabolic Panel; Future  -     CBC Auto Differential; Future    Class 1 obesity due to excess calories with serious comorbidity and body mass index (BMI) of 33.0 to 33.9 in adult  Patient counseled on healthy dietary choices and the benefits of a lower salt and/or lower carbohydrate diet as appropriate. Patient also counseled on benefits of moderate intensity cardiovascular exercise for 20-30 minutes at least 3-5 days a week. Advice was given to make small changes over time, setting smaller achievable goals until recommended lifestyle changes are reached.      Screening exam for skin cancer  -     Amb External Referral To Dermatology

## 2020-07-08 ENCOUNTER — HOSPITAL ENCOUNTER (OUTPATIENT)
Dept: PHYSICAL THERAPY | Age: 70
Setting detail: THERAPIES SERIES
Discharge: HOME | End: 2020-07-08
Payer: MEDICARE

## 2020-07-08 PROCEDURE — 97110 THERAPEUTIC EXERCISES: CPT

## 2020-07-08 PROCEDURE — 97140 MANUAL THERAPY 1/> REGIONS: CPT

## 2020-07-08 PROCEDURE — 97116 GAIT TRAINING THERAPY: CPT

## 2020-07-08 NOTE — PROGRESS NOTES
Physical Therapy  Daily Treatment Note  Date: 2020  Patient Name: Ortega Short  MRN: [id number]     :   1950    Treatment Diagnosis: Lumbar Stenosis with neurogenic claudication postlumbar surgery with bone spur removal on 3/27/20     Restrictions  Position Activity Restriction  Other position/activity restrictions: pt reports per surgeon- listen to back and do what can tolerate  Subjective:   General  Chart Reviewed: Yes  Additional Pertinent Hx: Pt reports surgery 3/27/20 to low back L2-5- removed bone spurs, no hardware, pt reports hasn't used cane since surgery, one episode 3 weeks postop with LLE going numb and limp- no fall, cautious since then with distance, able to ride tractor for an acre of grass  Family / Caregiver Present: No  Referring Practitioner: Dr Selwyn Patrick   PT Visit Information  Onset Date: 20(surgery date)  Total # of Visits Approved: 19(9+10=19)  Total # of Visits to Date: 13  Plan of Care/Certification Expiration Date: 07/10/20  No Show: 0  Canceled Appointment: 0  Subjective  Subjective: Pt. denies pain states he is off Tyelonol. Pt. states no falls. Pt. states he has been busy at work. Pain Screening  Patient Currently in Pain: No  Vital Signs  Patient Currently in Pain: No       Treatment Activities:   Manual therapy  Soft Tissue Mobalization: MFR R flank to top of illiac crest to dec tightness and pain               Ambulation  Ambulation?: Yes  Ambulation 1  Surface: level tile;carpet  Device: No Device  Other Apparatus: (pt defers use of prefab AFO to decrease foot slap)  Assistance: Supervision;Modified Independent  Quality of Gait: foot slap around 120 ft. Inc postural sway   no reaching for walls but ambulation nearby wall one LOB L toe catching able to self correct with wall.     Distance: 0'  Comments: 6 min 21 sec one short RB required this date, mild R flank pain 3/10 starting around 440'                    Exercises  Exercise 1: bridge x 20 hold 5 sec   Exercise 2: Reviewed seated Hamstring stretches   Exercise 3: scissor bridge x 10 hold 3 sec GTB   Exercise 7: sit<>stand x 10 retropell 2/10 one posterior  LOB    Exercise 8: SKTC x 3 hold 30 sec R and L   Exercise 9: LTR L and R 30 sec x 3         Manual therapy  Soft Tissue Mobalization: MFR R flank to top of illiac crest to dec tightness and pain                           Assessment:   Conditions Requiring Skilled Therapeutic Intervention  Body structures, Functions, Activity limitations: Decreased functional mobility ; Decreased endurance;Decreased balance;Decreased strength  Assessment: Due to fatigue and R flank pain educated and perofrmed gentle streches to dec tightness in low back. Pain decreased to half post stretching from 3/10. Also gentle MFR to R flank to dec pain and tightness. Limited exercises this date due to pain and fatigue. Pt. educated he needs to take time for himself to perform exercises and deals with pain. Per patient sits in hot tub and uses TENS machine occ for releif. Less tense post with no pain reported. Pt. declined CP.     Treatment Diagnosis: Lumbar Stenosis with neurogenic claudication postlumbar surgery with bone spur removal on 3/27/20  Prognosis: Good  REQUIRES PT FOLLOW UP: Yes  Activity Tolerance  Activity Tolerance: Patient Tolerated treatment well(endurance limitations also)      G-Code:     OutComes Score                                                     Goals:  Short term goals  Time Frame for Short term goals: 2 weeks  Short term goal 1: Pt reporting HEP at least 1x daily 6/7 days (6/10 hamstring and ankle DF dailiy + work instead )  Short term goal 2: Pt able to initiate standing MICHAEL with UE support in sets of 5 reps for increasing strength and endurance(met 6/10/20)  Short term goal 3: Pt able to walk 300ft before fatigued with more upright posture- GOAL MET (met 6/10/20)  Short term goal 4: Assess stairs with 1 rail x 10 steps reciprically with mild fatigue post (previoulsy tested = met 6/10/20)  Short term goal 5: Pt able to ride recumbant bike 5 minutes before fatigued or increased symptoms. (GOAL MET )  Long term goals  Time Frame for Long term goals : 8-10 weeks(increased at recheck 6/10/20)  Long term goal 1: Pt able to complete 6 minute walk test at >= 500ft with less than or equal to one standing rest (GOAL ' in 6 min 1 standing RB )  Long term goal 2: Improve gait with increased upright posture and decreased foot slap in gait trials, pt reporting no falls at home .(gait continuing to improve P Met 6/10/20)  Long term goal 3: Increase B LE strength to 4+/5 hips, >=4/5 ankle DF so that pt can ambulate safely with or without an AD(improving P Met 6/10/20)  Long term goal 4: Oswestry in regards to endurance <= 30% disability(Oswestry 23/50 = 46 % disability , P Met 6/10/20 more active)  Long term goal 5: Pt with standing HEP independence for continued strengthening and endurance for function(pt able to do but incosistent stand with HEP 6/10/20)  Patient Goals   Patient goals : \"I need to get stronger and have better endurance and balance so I don't fall and get back to normal. \"(6/10/20 in progress)    Plan:      continue 2x week for 4-5 more weeks to increase functional gait, endurance and strength while decreasing R low back flank c/o.         Therapy Time   Individual Concurrent Group Co-treatment   Time In  900         Time Out  Jorge [address], PTA  License and [address] Number: [id number]

## 2020-07-10 ENCOUNTER — HOSPITAL ENCOUNTER (OUTPATIENT)
Dept: PHYSICAL THERAPY | Age: 70
Setting detail: THERAPIES SERIES
Discharge: HOME | End: 2020-07-10
Payer: MEDICARE

## 2020-07-10 PROCEDURE — 97110 THERAPEUTIC EXERCISES: CPT

## 2020-07-10 PROCEDURE — 97116 GAIT TRAINING THERAPY: CPT

## 2020-07-10 PROCEDURE — 97140 MANUAL THERAPY 1/> REGIONS: CPT

## 2020-07-10 PROCEDURE — 97530 THERAPEUTIC ACTIVITIES: CPT

## 2020-07-10 ASSESSMENT — PAIN DESCRIPTION - PAIN TYPE: TYPE: CHRONIC PAIN

## 2020-07-10 ASSESSMENT — PAIN DESCRIPTION - LOCATION: LOCATION: ANKLE

## 2020-07-10 ASSESSMENT — PAIN DESCRIPTION - DESCRIPTORS: DESCRIPTORS: ACHING

## 2020-07-10 ASSESSMENT — PAIN DESCRIPTION - FREQUENCY: FREQUENCY: INTERMITTENT

## 2020-07-10 NOTE — PROGRESS NOTES
Ambulation 1  Surface: level tile;carpet  Device: No Device  Other Apparatus: (pt  defers use of prefab AFO, persist neuropathy checo DF )  Assistance: Modified Independent  Quality of Gait: increased foot slap at 150 ft, pt pushing pace,   Distance: 880 ft in 5 min and 37sec, improved 44 sec from trial earlier this week. Comments: Educated precautions and awareness of foot drop to prevent and decrease fall risk   Stairs/Curb  Stairs?: No        PROM RLE (degrees)  RLE General PROM: hamstring 90/90 -32 deg  PROM LLE (degrees)  LLE General PROM: hamtring 90/90 = -30 deg   Strength RLE  Comment: true plane supine, prone, sidelying  R Hip Flexion: 4/5  R Hip ABduction: 4/5;4+/5  R Hip ADduction: 4/5  R Knee Flexion: 4/5  R Knee Extension: 5/5  R Ankle Dorsiflexion: 3+/5(dec AROM, poor endurance- old from neuropathy)  R Ankle Plantar flexion: 4+/5  Strength LLE  Comment: true plane supine, prone, side lying   L Hip Flexion: 4/5  L Hip ABduction: 4/5;4+/5  L Hip ADduction: 4/5  L Knee Flexion: 4/5;4+/5  L Knee Extension: 5/5  L Ankle Dorsiflexion: 3+/5(dec AROM, poor endurance- old from neuropathy)  L Ankle Plantar Flexion: 4+/5    Exercises  Exercise 2: hamstring 90/90 45 ec x 3 alteranting prior to walk  Exercise 3: scissor bridge x 10 hold 3 sec GTB       Assessment:   Conditions Requiring Skilled Therapeutic Intervention  Body structures, Functions, Activity limitations: Decreased functional mobility ; Decreased endurance;Decreased balance;Decreased strength  Assessment: 70yom who has imrpoved in strenght, endurance and gait, footdrop persists due to old neuropathy, Pt ready for dishcarge ot HEP . THourough education on need for HEP and moving, possible use of KT tape. Pt also needs to use TENS, ice, hot tub and rest as needed for best success. Encouraged use of recumbent bike and exercises daily.    Treatment Diagnosis: Lumbar Stenosis with neurogenic claudication postlumbar surgery with bone spur removal on 3/27/20  REQUIRES PT FOLLOW UP: No  Activity Tolerance  Activity Tolerance: Patient Tolerated treatment well      Goals:  Short term goals  Time Frame for Short term goals: 2 weeks  Short term goal 1: Pt reporting HEP at least 1x daily 6/7 days (pt only doing hamstring & ankle DF MICHAEL consistently 7/10/20)  Short term goal 2: Pt able to initiate standing MICHAEL with UE support in sets of 5 reps for increasing strength and endurance(previously met 7/10/20)  Short term goal 3: Pt able to walk 300ft before fatigued with more upright posture- GOAL MET (previously met 7/10/20)  Short term goal 4: Assess stairs with 1 rail x 10 steps reciprically with mild fatigue post (previously met 7/10/20)  Short term goal 5: Pt able to ride recumbant bike 5 minutes before fatigued or increased symptoms. (met 7/10/20)  Long term goals  Time Frame for Long term goals : 8-10 weeks  Long term goal 1: Pt able to complete 6 minute walk test at >= 500ft with less than or equal to one standing rest (partially met 880 ft in 5 minutes and 37 seconds)  Long term goal 2: Improve gait with increased upright posture and decreased foot slap in gait trials, pt reporting no falls at home .(partially met as foot slap persists from old neuropathy 7/10)  Long term goal 3:  Increase B LE strength to 4+/5 hips, >=4/5 ankle DF so that pt can ambulate safely with or without an AD(part met- majority at 4/5 or better x ankle DF foot drop)  Long term goal 4: Oswestry in regards to endurance <= 30% disability(met 7/10/20)  Long term goal 5: Pt with standing HEP independence for continued strengthening and endurance for function(P Met can do standing MICHAEL, inconsistent  with HEP 7/10/20)  Patient Goals   Patient goals : \"I need to get stronger and have better endurance and balance so I don't fall and get back to normal. \"(majority met 7/10/20)    Plan:  Discharge to HEP    Therapy Time   Individual Concurrent Group Co-treatment   Time In  830         Time Out  218 Minutes  [address], [id number]

## 2020-07-29 ENCOUNTER — HOSPITAL ENCOUNTER (OUTPATIENT)
Dept: LAB | Age: 70
Discharge: HOME | End: 2020-07-29
Payer: MEDICARE

## 2020-07-29 LAB — POTASSIUM SERPL-SCNC: 5.2 MEQ/L (ref 3.4–4.9)

## 2020-07-29 PROCEDURE — 36415 COLL VENOUS BLD VENIPUNCTURE: CPT

## 2020-07-29 PROCEDURE — 84132 ASSAY OF SERUM POTASSIUM: CPT

## 2020-08-24 ENCOUNTER — HOSPITAL ENCOUNTER (OUTPATIENT)
Dept: LAB | Age: 70
Discharge: HOME | End: 2020-08-24
Payer: MEDICARE

## 2020-08-24 LAB — POTASSIUM SERPL-SCNC: 4.7 MEQ/L (ref 3.4–4.9)

## 2020-08-24 PROCEDURE — 36415 COLL VENOUS BLD VENIPUNCTURE: CPT

## 2020-08-24 PROCEDURE — 84132 ASSAY OF SERUM POTASSIUM: CPT

## 2020-10-15 NOTE — TELEPHONE ENCOUNTER
Pharmacy is requesting medication refill. Please approve or deny this request.    Rx requested:  Requested Prescriptions     Pending Prescriptions Disp Refills    irbesartan (AVAPRO) 300 MG tablet 30 tablet 3     Sig: Take 1 tablet by mouth every morning Take 1/2 tablet BID         Last Office Visit:   7/7/2020      Next Visit Date:  No future appointments.

## 2020-10-17 RX ORDER — IRBESARTAN 300 MG/1
300 TABLET ORAL EVERY MORNING
Qty: 90 TABLET | Refills: 1 | Status: SHIPPED | OUTPATIENT
Start: 2020-10-17 | End: 2021-05-06

## 2020-11-11 RX ORDER — FLASH GLUCOSE SENSOR
KIT MISCELLANEOUS
Qty: 6 EACH | Refills: 0 | Status: SHIPPED | OUTPATIENT
Start: 2020-11-11 | End: 2021-04-06 | Stop reason: SDUPTHER

## 2020-12-18 ENCOUNTER — OFFICE VISIT (OUTPATIENT)
Dept: FAMILY MEDICINE CLINIC | Age: 70
End: 2020-12-18
Payer: MEDICARE

## 2020-12-18 ENCOUNTER — PATIENT MESSAGE (OUTPATIENT)
Dept: FAMILY MEDICINE CLINIC | Age: 70
End: 2020-12-18

## 2020-12-18 VITALS
TEMPERATURE: 96.8 F | WEIGHT: 245 LBS | OXYGEN SATURATION: 99 % | RESPIRATION RATE: 13 BRPM | BODY MASS INDEX: 33.23 KG/M2 | HEART RATE: 81 BPM | SYSTOLIC BLOOD PRESSURE: 128 MMHG | DIASTOLIC BLOOD PRESSURE: 68 MMHG

## 2020-12-18 DIAGNOSIS — L03.115 CELLULITIS OF RIGHT LOWER LEG: Primary | ICD-10-CM

## 2020-12-18 PROCEDURE — 99213 OFFICE O/P EST LOW 20 MIN: CPT | Performed by: NURSE PRACTITIONER

## 2020-12-18 RX ORDER — CEPHALEXIN 500 MG/1
500 CAPSULE ORAL 3 TIMES DAILY
Qty: 21 CAPSULE | Refills: 0 | Status: SHIPPED | OUTPATIENT
Start: 2020-12-18 | End: 2020-12-25

## 2020-12-18 NOTE — PROGRESS NOTES
12/18/2020    SUBJECTIVE:     Ashlee August, 69 y.o. male presents today with:  Chief Complaint   Patient presents with    Cellulitis     right leg; red,swollen and hurts; sore to the touch since last night goes from ankle and up close to knee      HPI  Stephanie Mccullough presents for evaluation of a possible skin infection located on the right lower leg. Onset: yesterday with gradually worsening course since that time. Symptoms include: moderate pain, erythema, and swelling of the RLE. Patient does have history of the same-- previous tx has included clindamycin. Patient denies: fever greater than 100, shortness of breath, chest pain. Denies recent immobility/ bedrest/ surgery. Denies recent purulence or penetrating trauma. States he did remove/ trim some thickened skin/ callous from the R foot within the last 10 days. Denies purulence, pain at the site, or delayed/complicated healing. Has antibiotic leftover from previous episode of cellulitis and planned to take it, was advised to be seen for eval.    Patient's medications, allergies, past medical, surgical, social and family histories were reviewed and updated as appropriate. Review of Systems   Constitutional: Negative for chills and fever. Respiratory: Negative for cough, chest tightness and shortness of breath. Cardiovascular: Positive for leg swelling. Negative for chest pain and palpitations. Gastrointestinal: Negative for abdominal pain, diarrhea, nausea and vomiting. Musculoskeletal: Positive for joint swelling. Negative for back pain. Skin: Positive for color change. Neurological: Negative for light-headedness and numbness. OBJECTIVE:     Vitals:    12/18/20 1836   BP: 128/68   Pulse: 81   Resp: 13   Temp: 96.8 °F (36 °C)   TempSrc: Temporal   SpO2: 99%   Weight: 245 lb (111.1 kg)     Physical Exam  Vitals signs reviewed. Constitutional:       General: He is not in acute distress. Appearance: He is well-groomed. HENT:      Head: Normocephalic and atraumatic. Right Ear: External ear normal.      Left Ear: External ear normal.      Nose: Nose normal.      Mouth/Throat:      Lips: Pink. No lesions. Eyes:      General: Lids are normal.      Conjunctiva/sclera: Conjunctivae normal.   Neck:      Musculoskeletal: Normal range of motion and neck supple. Trachea: Trachea and phonation normal.   Cardiovascular:      Rate and Rhythm: Normal rate and regular rhythm. Pulses: Normal pulses. Heart sounds: Normal heart sounds. No murmur. No friction rub. Pulmonary:      Effort: Pulmonary effort is normal. No tachypnea. Breath sounds: Normal breath sounds and air entry. Musculoskeletal: Normal range of motion. Right knee: Normal.      Left knee: Normal.      Right lower leg: He exhibits tenderness. He exhibits no bony tenderness, no deformity and no laceration. Edema (+ edema. there are no open areas/ blisters observed.) present. Left lower leg: Normal.        Legs:    Feet:      Right foot:      Skin integrity: Callus and dry skin present. No ulcer, blister, skin breakdown, erythema or warmth. Skin:     General: Skin is warm and dry. Findings: Erythema present. Neurological:      General: No focal deficit present. Mental Status: He is alert. Mental status is at baseline. Sensory: Sensation is intact. Gait: Gait is intact. Psychiatric:         Behavior: Behavior is cooperative. POC Testing Today: No results found for this visit on 12/18/20. ASSESSMENT & PLAN:   69 y.o. male presenting w/ local erythema, warmth, swelling to the RLE for one day. Sensitivity/pain to light touch around the erythematous area. No lymphangitic spread visible. Negative vikas's Pt is non-toxic in appearance. Low concern for DVT. Erythema outlined, antibiotic prescribed.      Diagnoses and all orders for this visit:    ICD-10-CM

## 2020-12-18 NOTE — TELEPHONE ENCOUNTER
Patient needs to be seen by a provider before any antibiotic could be prescribed. Instruct him to come to walk-in clinic ASAP.

## 2020-12-18 NOTE — TELEPHONE ENCOUNTER
From: Abi Rdz  To: Allan Tao MD  Sent: 12/18/2020 11:34 AM EST  Subject: Non-Urgent Medical Question    I have developed a case of cellulites in my right lower leg again. I would need a prescription called into [facility] for this. I have had this several times and have been taking clindamycin 300 MG capsule for this condition. I do not have enough on hand to take at this time. Thanks, Tomeka Yeager, 1950 or call [phone] work number till 5:30 today.

## 2020-12-19 NOTE — PATIENT INSTRUCTIONS
Antibiotic was e-scripted to your pharmacy-- begin today    Elevate feet above the level of the heart whenever possible    We expect symptomatic improvement within 24 to 48 hours of beginning antibiotic-- visible  Improvement may take up to 72 hours    Antibiotic Instructions: Complete the full course of antibiotics as ordered. Take each dose with a small snack or meal to lessen potential GI upset. To prevent antibiotic resistance, please take medication as ordered and for the full duration even if you start to feel better. Consider intake of yogurt or probiotic during antibiotic use and for a few days after to help reduce the risk of developing a secondary infection. Separate the yogurt and antibiotic by at least 1 hour. Avoid alcohol while taking antibiotics. Seek immediate medical care/ go to ER for high fever, chills, redness extending beyond the marked border, worsening pain, any concerns     Follow-up w/ PCP in one week for wound check    Patient Education        Cellulitis: Care Instructions  Your Care Instructions     Cellulitis is a skin infection caused by bacteria, most often strep or staph. It often occurs after a break in the skin from a scrape, cut, bite, or puncture, or after a rash. Cellulitis may be treated without doing tests to find out what caused it. But your doctor may do tests, if needed, to look for a specific bacteria, like methicillin-resistant Staphylococcus aureus (MRSA). The doctor has checked you carefully, but problems can develop later. If you notice any problems or new symptoms, get medical treatment right away. Follow-up care is a key part of your treatment and safety. Be sure to make and go to all appointments, and call your doctor if you are having problems. It's also a good idea to know your test results and keep a list of the medicines you take. How can you care for yourself at home? · Take your antibiotics as directed. Do not stop taking them just because you feel better. You need to take the full course of antibiotics. · Prop up the infected area on pillows to reduce pain and swelling. Try to keep the area above the level of your heart as often as you can. · If your doctor told you how to care for your wound, follow your doctor's instructions. If you did not get instructions, follow this general advice:  ? Wash the wound with clean water 2 times a day. Don't use hydrogen peroxide or alcohol, which can slow healing. ? You may cover the wound with a thin layer of petroleum jelly, such as Vaseline, and a nonstick bandage. ? Apply more petroleum jelly and replace the bandage as needed. · Be safe with medicines. Take pain medicines exactly as directed. ? If the doctor gave you a prescription medicine for pain, take it as prescribed. ? If you are not taking a prescription pain medicine, ask your doctor if you can take an over-the-counter medicine. To prevent cellulitis in the future  · Try to prevent cuts, scrapes, or other injuries to your skin. Cellulitis most often occurs where there is a break in the skin. · If you get a scrape, cut, mild burn, or bite, wash the wound with clean water as soon as you can to help avoid infection. Don't use hydrogen peroxide or alcohol, which can slow healing. · If you have swelling in your legs (edema), support stockings and good skin care may help prevent leg sores and cellulitis. · Take care of your feet, especially if you have diabetes or other conditions that increase the risk of infection. Wear shoes and socks. Do not go barefoot. If you have athlete's foot or other skin problems on your feet, talk to your doctor about how to treat them. When should you call for help? Call your doctor now or seek immediate medical care if:    · You have signs that your infection is getting worse, such as:  ? Increased pain, swelling, warmth, or redness. ? Red streaks leading from the area. ? Pus draining from the area. ? A fever.     · You get a rash. Watch closely for changes in your health, and be sure to contact your doctor if:    · You do not get better as expected. Where can you learn more? Go to [email or web address]://[email or web address] org and sign in to your [employer] account. Enter Y124 in the [facility] box to learn more about \"Cellulitis: Care Instructions. \"     If you do not have an account, please click on the \"Sign Up Now\" link. Current as of: July 2, 2020               Content Version: 12.6  © 7016-2647 [employer]. Care instructions adapted under license by [facility] ([facility]). If you have questions about a medical condition or this instruction, always ask your healthcare professional. [address] any warranty or liability for your use of this information.

## 2021-01-03 ASSESSMENT — ENCOUNTER SYMPTOMS
COUGH: 0
COLOR CHANGE: 1
DIARRHEA: 0
NAUSEA: 0
SHORTNESS OF BREATH: 0
CHEST TIGHTNESS: 0
VOMITING: 0
ABDOMINAL PAIN: 0
BACK PAIN: 0

## 2021-01-08 ENCOUNTER — HOSPITAL ENCOUNTER (OUTPATIENT)
Dept: LAB | Age: 71
Discharge: HOME | End: 2021-01-08
Payer: MEDICARE

## 2021-01-08 DIAGNOSIS — E10.9 TYPE 1 DIABETES MELLITUS ON INSULIN THERAPY (HCC): ICD-10-CM

## 2021-01-08 DIAGNOSIS — D61.818 PANCYTOPENIA (HCC): ICD-10-CM

## 2021-01-08 LAB
ANION GAP SERPL CALCULATED.3IONS-SCNC: 8 MEQ/L (ref 9–15)
BASOPHILS ABSOLUTE: 0.1 K/UL (ref 0–0.2)
BASOPHILS RELATIVE PERCENT: 2 %
BUN BLDV-MCNC: 17 MG/DL (ref 8–23)
BURR CELLS: ABNORMAL
CALCIUM SERPL-MCNC: 9.3 MG/DL (ref 8.5–9.9)
CHLORIDE BLD-SCNC: 102 MEQ/L (ref 95–107)
CO2: 28 MEQ/L (ref 20–31)
CREAT SERPL-MCNC: 0.84 MG/DL (ref 0.7–1.2)
EOSINOPHILS ABSOLUTE: 0.1 K/UL (ref 0–0.7)
EOSINOPHILS RELATIVE PERCENT: 2 %
GFR AFRICAN AMERICAN: >60
GFR NON-AFRICAN AMERICAN: >60
GLUCOSE BLD-MCNC: 156 MG/DL (ref 70–99)
HBA1C MFR BLD: 8 % (ref 4.8–5.9)
HCT VFR BLD CALC: 40.2 % (ref 42–52)
HEMOGLOBIN: 13.3 G/DL (ref 14–18)
LYMPHOCYTES ABSOLUTE: 1.1 K/UL (ref 1–4.8)
LYMPHOCYTES RELATIVE PERCENT: 29 %
MCH RBC QN AUTO: 30.8 PG (ref 27–31.3)
MCHC RBC AUTO-ENTMCNC: 33.1 % (ref 33–37)
MCV RBC AUTO: 93.2 FL (ref 80–100)
MONOCYTES ABSOLUTE: 0.3 K/UL (ref 0.2–0.8)
MONOCYTES RELATIVE PERCENT: 6.6 %
NEUTROPHILS ABSOLUTE: 2.3 K/UL (ref 1.4–6.5)
NEUTROPHILS RELATIVE PERCENT: 60 %
PDW BLD-RTO: 14.7 % (ref 11.5–14.5)
PLATELET # BLD: 155 K/UL (ref 130–400)
PLATELET SLIDE REVIEW: NORMAL
POTASSIUM SERPL-SCNC: 5 MEQ/L (ref 3.4–4.9)
RBC # BLD: 4.31 M/UL (ref 4.7–6.1)
SODIUM BLD-SCNC: 138 MEQ/L (ref 135–144)
WBC # BLD: 3.9 K/UL (ref 4.8–10.8)

## 2021-01-08 PROCEDURE — 85025 COMPLETE CBC W/AUTO DIFF WBC: CPT

## 2021-01-08 PROCEDURE — 36415 COLL VENOUS BLD VENIPUNCTURE: CPT

## 2021-01-08 PROCEDURE — 83036 HEMOGLOBIN GLYCOSYLATED A1C: CPT

## 2021-01-08 PROCEDURE — 80048 BASIC METABOLIC PNL TOTAL CA: CPT

## 2021-01-22 ENCOUNTER — VIRTUAL VISIT (OUTPATIENT)
Dept: FAMILY MEDICINE CLINIC | Age: 71
End: 2021-01-22
Payer: MEDICARE

## 2021-01-22 ENCOUNTER — HOSPITAL ENCOUNTER (OUTPATIENT)
Dept: ULTRASOUND IMAGING | Age: 71
Discharge: HOME | End: 2021-01-24
Payer: MEDICARE

## 2021-01-22 DIAGNOSIS — M79.661 PAIN IN RIGHT LOWER LEG: ICD-10-CM

## 2021-01-22 DIAGNOSIS — E10.9 TYPE 1 DIABETES MELLITUS ON INSULIN THERAPY (HCC): ICD-10-CM

## 2021-01-22 DIAGNOSIS — E66.09 CLASS 1 OBESITY DUE TO EXCESS CALORIES WITH SERIOUS COMORBIDITY AND BODY MASS INDEX (BMI) OF 33.0 TO 33.9 IN ADULT: ICD-10-CM

## 2021-01-22 DIAGNOSIS — E87.5 HYPERKALEMIA: ICD-10-CM

## 2021-01-22 DIAGNOSIS — N52.8 OTHER MALE ERECTILE DYSFUNCTION: ICD-10-CM

## 2021-01-22 DIAGNOSIS — L03.115 CELLULITIS OF RIGHT LOWER EXTREMITY: Primary | ICD-10-CM

## 2021-01-22 DIAGNOSIS — G63 POLYNEUROPATHY ASSOCIATED WITH UNDERLYING DISEASE (HCC): ICD-10-CM

## 2021-01-22 DIAGNOSIS — D61.818 PANCYTOPENIA (HCC): ICD-10-CM

## 2021-01-22 DIAGNOSIS — E10.42 POLYNEUROPATHY DUE TO TYPE 1 DIABETES MELLITUS (HCC): ICD-10-CM

## 2021-01-22 DIAGNOSIS — I10 ESSENTIAL HYPERTENSION: Chronic | ICD-10-CM

## 2021-01-22 DIAGNOSIS — R80.9 MICROALBUMINURIA DUE TO TYPE 1 DIABETES MELLITUS (HCC): ICD-10-CM

## 2021-01-22 DIAGNOSIS — E10.29 MICROALBUMINURIA DUE TO TYPE 1 DIABETES MELLITUS (HCC): ICD-10-CM

## 2021-01-22 DIAGNOSIS — N40.1 BPH ASSOCIATED WITH NOCTURIA: ICD-10-CM

## 2021-01-22 DIAGNOSIS — E78.2 MIXED HYPERLIPIDEMIA: Chronic | ICD-10-CM

## 2021-01-22 DIAGNOSIS — R35.1 BPH ASSOCIATED WITH NOCTURIA: ICD-10-CM

## 2021-01-22 DIAGNOSIS — R60.0 EDEMA OF RIGHT LOWER EXTREMITY: ICD-10-CM

## 2021-01-22 DIAGNOSIS — G82.20 CHRONIC PROGRESSIVE PARAPARESIS (HCC): ICD-10-CM

## 2021-01-22 DIAGNOSIS — I25.10 CORONARY ARTERY DISEASE INVOLVING NATIVE CORONARY ARTERY OF NATIVE HEART WITHOUT ANGINA PECTORIS: Chronic | ICD-10-CM

## 2021-01-22 DIAGNOSIS — I48.0 PAROXYSMAL ATRIAL FIBRILLATION (HCC): Chronic | ICD-10-CM

## 2021-01-22 PROCEDURE — 99443 PR PHYS/QHP TELEPHONE EVALUATION 21-30 MIN: CPT | Performed by: FAMILY MEDICINE

## 2021-01-22 PROCEDURE — 93971 EXTREMITY STUDY: CPT

## 2021-01-22 RX ORDER — SULFAMETHOXAZOLE AND TRIMETHOPRIM 800; 160 MG/1; MG/1
1 TABLET ORAL 2 TIMES DAILY
Qty: 20 TABLET | Refills: 0 | Status: SHIPPED | OUTPATIENT
Start: 2021-01-22 | End: 2021-02-01

## 2021-01-22 RX ORDER — HYDROCHLOROTHIAZIDE 12.5 MG/1
TABLET ORAL
COMMUNITY
Start: 2020-12-09

## 2021-01-22 RX ORDER — TADALAFIL 5 MG/1
5 TABLET ORAL DAILY
Qty: 90 TABLET | Refills: 3 | Status: SHIPPED | OUTPATIENT
Start: 2021-01-22

## 2021-01-22 ASSESSMENT — PATIENT HEALTH QUESTIONNAIRE - PHQ9
1. LITTLE INTEREST OR PLEASURE IN DOING THINGS: 0
2. FEELING DOWN, DEPRESSED OR HOPELESS: 0
SUM OF ALL RESPONSES TO PHQ QUESTIONS 1-9: 0

## 2021-01-22 ASSESSMENT — ENCOUNTER SYMPTOMS
NAUSEA: 0
SHORTNESS OF BREATH: 0
CONSTIPATION: 0
VOMITING: 0
ABDOMINAL PAIN: 0
CHEST TIGHTNESS: 0
BLOOD IN STOOL: 0
ANAL BLEEDING: 0
WHEEZING: 0
COUGH: 0
DIARRHEA: 0

## 2021-01-22 NOTE — ASSESSMENT & PLAN NOTE
We will treat with a course of Bactrim to cover for MRSA and have patient keep close follow-up in the next 7 to 10 days for reassessment. Due to reported persistent swelling he will also be sent for stat ultrasound to rule out DVT. Patient was instructed to go to the emergency room for any worsening discomfort, worsening erythema, worsening swelling, or fever/chills/sweats despite treatment.

## 2021-01-22 NOTE — ASSESSMENT & PLAN NOTE
Blood pressure values historically well controlled.   He will return to the office for a nurse visit blood pressure check to reassess

## 2021-01-22 NOTE — ASSESSMENT & PLAN NOTE
Most recent A1c above goal control. I stressed with patient the importance of doubling down on healthy lifestyle efforts and the need for endocrinology follow-up to discuss further medication adjustments.   I will have office staff help patient schedule a visit with the specialist to discuss medication management

## 2021-01-22 NOTE — PROGRESS NOTES
Patient reports being seen and [facility] on 12/18/2020 for right lower leg discomfort with erythema and swelling. He was diagnosed with cellulitis and was started on a course of cephalexin for management. He reports finishing course of antibiotic and feeling improved overall, but states there is still tenderness to touch, mild erythema, and mild increased warmth to the right lower extremity. He denies any fevers, chills, sweats, or malaise. He states that there is baseline right lower extremity edema and his pain level is persistent throughout the day and unresolved. He denies any discomfort, erythema, warmth, or significant swelling to the left lower extremity. Review of Systems   Constitutional: Negative for appetite change, chills, diaphoresis, fatigue, fever and unexpected weight change. Eyes: Negative for visual disturbance. Respiratory: Negative for cough, chest tightness, shortness of breath and wheezing. Cardiovascular: Positive for leg swelling. Negative for chest pain and palpitations. No orthopnea, No PND   Gastrointestinal: Negative for abdominal pain, anal bleeding, blood in stool, constipation, diarrhea, nausea and vomiting. No heartburn, No melena   Endocrine: Negative for cold intolerance, heat intolerance, polydipsia, polyphagia and polyuria. Genitourinary: Negative for dysuria and hematuria. +nocturia (1 time per night), No sensation of incomplete bladder emptying   Musculoskeletal: Negative for myalgias. Skin: Negative for rash. Neurological: Negative for dizziness, syncope, weakness, light-headedness, numbness and headaches. Psychiatric/Behavioral: Negative for dysphoric mood. The patient is not nervous/anxious. Problem List Items Addressed This Visit        Circulatory    Hypertension (Chronic)     Blood pressure values historically well controlled.   He will return to the office for a nurse visit blood pressure check to reassess CAD (coronary artery disease) (Chronic)     Stable. No reported chest pain or limitation in normal day-to-day activity. Patient established with cardiology for long-term follow-up. I stressed with patient the importance of tight blood glucose, cholesterol, and blood pressure control. Relevant Medications    hydroCHLOROthiazide (HYDRODIURIL) 12.5 MG tablet    tadalafil (CIALIS) 5 MG tablet    Paroxysmal atrial fibrillation (HCC) (Chronic)     Stable. Patient remains asymptomatic and has chronic follow-up in place with cardiology office. Continue current medication            Endocrine    Microalbuminuria due to type 1 diabetes mellitus ([address]     Stable. We will continue to follow lab work over time         Type 1 diabetes mellitus on insulin therapy ([address]     Most recent A1c above goal control. I stressed with patient the importance of doubling down on healthy lifestyle efforts and the need for endocrinology follow-up to discuss further medication adjustments. I will have office staff help patient schedule a visit with the specialist to discuss medication management         Polyneuropathy due to type 1 diabetes mellitus ([address]     Stable. No reported new onset or worsening neuropathy pain. Continue current medication for management of symptoms            Nervous and Auditory    Chronic progressive paraparesis (HCC)     Stable. No reported change in baseline functioning at home. Continue current management         Polyneuropathy associated with underlying disease ([address]     Stable. Continue current medication for pain management            Hematopoietic and Hemostatic    Pancytopenia ([address]     Most recent blood counts reviewed with patient today during visit. Stable.   We will continue to follow over time            Other    Obesity Patient counseled on healthy dietary choices and the benefits of a lower salt and/or lower carbohydrate diet as appropriate. Patient also counseled on benefits of moderate intensity cardiovascular exercise for 20-30 minutes at least 3-5 days a week. Advice was given to make small changes over time, setting smaller achievable goals until recommended lifestyle changes are reached. Cellulitis of right lower extremity - Primary     We will treat with a course of Bactrim to cover for MRSA and have patient keep close follow-up in the next 7 to 10 days for reassessment. Due to reported persistent swelling he will also be sent for stat ultrasound to rule out DVT. Patient was instructed to go to the emergency room for any worsening discomfort, worsening erythema, worsening swelling, or fever/chills/sweats despite treatment. Relevant Medications    sulfamethoxazole-trimethoprim (BACTRIM DS) 800-160 MG per tablet    Hyperlipidemia (Chronic)     Stable.   Continue current medication         Relevant Medications    hydroCHLOROthiazide (HYDRODIURIL) 12.5 MG tablet    tadalafil (CIALIS) 5 MG tablet    ED (erectile dysfunction) (Chronic)    Relevant Medications    tadalafil (CIALIS) 5 MG tablet      Other Visit Diagnoses     Pain in right lower leg        Relevant Medications    sulfamethoxazole-trimethoprim (BACTRIM DS) 800-160 MG per tablet    Other Relevant Orders    US DUP LOWER EXTREMITY RIGHT MALLORY    Edema of right lower extremity        Relevant Medications    sulfamethoxazole-trimethoprim (BACTRIM DS) 800-160 MG per tablet    Other Relevant Orders    US DUP LOWER EXTREMITY RIGHT MALLORY    Hyperkalemia        Relevant Orders    Potassium    BPH associated with nocturia        Relevant Medications    tadalafil (CIALIS) 5 MG tablet          Follow-up in 7 to 10 days for right lower extremity cellulitis I affirm this is a Patient Initiated Episode with a Patient who has not had a related appointment within my department in the past 7 days or scheduled within the next 24 hours.     Patient identification was verified at the start of the visit: Yes    Total Time: minutes: 21-30 minutes     Patient location: Individual Home  Provider location: Office      Note: not billable if this call serves to triage the patient into an appointment for the relevant concern      EILZABETH Cisneros

## 2021-01-22 NOTE — ASSESSMENT & PLAN NOTE
Stable. No reported new onset or worsening neuropathy pain.   Continue current medication for management of symptoms

## 2021-01-22 NOTE — ASSESSMENT & PLAN NOTE
Stable. No reported chest pain or limitation in normal day-to-day activity. Patient established with cardiology for long-term follow-up. I stressed with patient the importance of tight blood glucose, cholesterol, and blood pressure control.

## 2021-01-27 ENCOUNTER — HOSPITAL ENCOUNTER (OUTPATIENT)
Dept: LAB | Age: 71
Discharge: HOME | End: 2021-01-27
Payer: MEDICARE

## 2021-01-27 DIAGNOSIS — E87.5 HYPERKALEMIA: ICD-10-CM

## 2021-01-27 DIAGNOSIS — E87.5 HYPERKALEMIA: Primary | ICD-10-CM

## 2021-01-27 LAB — POTASSIUM SERPL-SCNC: 5.5 MEQ/L (ref 3.4–4.9)

## 2021-01-27 PROCEDURE — 84132 ASSAY OF SERUM POTASSIUM: CPT

## 2021-01-27 PROCEDURE — 36415 COLL VENOUS BLD VENIPUNCTURE: CPT

## 2021-01-29 ENCOUNTER — OFFICE VISIT (OUTPATIENT)
Dept: FAMILY MEDICINE CLINIC | Age: 71
End: 2021-01-29
Payer: MEDICARE

## 2021-01-29 VITALS
DIASTOLIC BLOOD PRESSURE: 52 MMHG | OXYGEN SATURATION: 99 % | SYSTOLIC BLOOD PRESSURE: 110 MMHG | HEART RATE: 63 BPM | RESPIRATION RATE: 16 BRPM | TEMPERATURE: 96.5 F | HEIGHT: 71 IN | BODY MASS INDEX: 34.13 KG/M2 | WEIGHT: 243.8 LBS

## 2021-01-29 DIAGNOSIS — E10.9 TYPE 1 DIABETES MELLITUS ON INSULIN THERAPY (HCC): ICD-10-CM

## 2021-01-29 DIAGNOSIS — R60.0 BILATERAL LOWER EXTREMITY EDEMA: ICD-10-CM

## 2021-01-29 DIAGNOSIS — E10.42 POLYNEUROPATHY DUE TO TYPE 1 DIABETES MELLITUS (HCC): ICD-10-CM

## 2021-01-29 DIAGNOSIS — L03.115 CELLULITIS OF RIGHT LOWER EXTREMITY: Primary | ICD-10-CM

## 2021-01-29 DIAGNOSIS — B35.1 ONYCHOMYCOSIS OF MULTIPLE TOENAILS WITH TYPE 1 DIABETES MELLITUS AND PERIPHERAL NEUROPATHY (HCC): ICD-10-CM

## 2021-01-29 DIAGNOSIS — E10.69 ONYCHOMYCOSIS OF MULTIPLE TOENAILS WITH TYPE 1 DIABETES MELLITUS AND PERIPHERAL NEUROPATHY (HCC): ICD-10-CM

## 2021-01-29 DIAGNOSIS — E10.42 ONYCHOMYCOSIS OF MULTIPLE TOENAILS WITH TYPE 1 DIABETES MELLITUS AND PERIPHERAL NEUROPATHY (HCC): ICD-10-CM

## 2021-01-29 PROCEDURE — 3052F HG A1C>EQUAL 8.0%<EQUAL 9.0%: CPT | Performed by: FAMILY MEDICINE

## 2021-01-29 PROCEDURE — 99214 OFFICE O/P EST MOD 30 MIN: CPT | Performed by: FAMILY MEDICINE

## 2021-01-29 ASSESSMENT — ENCOUNTER SYMPTOMS
ABDOMINAL PAIN: 0
NAUSEA: 0
DIARRHEA: 0
VOMITING: 0
SHORTNESS OF BREATH: 0
CHEST TIGHTNESS: 0

## 2021-01-29 NOTE — PROGRESS NOTES
Gala Brian (: 1950) is a 69 y.o. male, Established patient, who presents today for:    Chief Complaint   Patient presents with    Cellulitis     F/u on right lower extremity cellulitis. Pt reports this problem is doing better for him. ASSESSMENT/PLAN    1. Cellulitis of right lower extremity  Assessment & Plan:  Clinically resolving over time with course of Bactrim. Patient to finish full course of antibiotic as directed and return for any persistent pain, streaking erythema, worsening swelling, or increased warmth. 2. Onychomycosis of multiple toenails with type 1 diabetes mellitus and peripheral neuropathy ([facility]  Assessment & Plan:  I reviewed with patient the need for podiatry management long-term to help with toenail clipping and care of his feet. I reviewed with him the increased risk for infection to the feet and lower extremities based on his noted onychomycosis to all toenails. Patient was given a referral to podiatry and instructed to schedule a visit. Orders:  -     [facility] Marisela Mosqueda DPM, Podiatry, [place]  3. Type 1 diabetes mellitus on insulin therapy [facility])  Assessment & Plan:  Patient with continued uncontrolled blood glucose values at home and most recent A1c above goal control. I stressed with him the importance of doing his best to keep a regimented schedule with regular mealtimes and equally portioned meals for breakfast, lunch, and dinner. I stressed with him the importance of a lower carbohydrate diet and the benefits of routine exercise as he is able outside of normal day-to-day activity. I stressed with him the need to follow-up with endocrinology to discuss further medication dose adjustments for better blood glucose control.   I will have front office staff help him schedule a visit with the specialist  Orders:  -      DIABETES FOOT EXAM  4. Polyneuropathy due to type 1 diabetes mellitus ([facility]  Assessment & Plan: Stable. I stressed with patient the importance of tight blood sugar control to help lessen the risk of further worsening over time. 5. Bilateral lower extremity edema  Assessment & Plan:  I stressed with patient the importance of using compression hose on a daily basis to help prevent chronic worsening of his lower extremity edema and increased risk for lower extremity infections. I advised him to elevate his legs at night while sleeping to help with further conservative management of edema      Return in about 4 months (around 5/29/2021) for Chronic Disease Check. SUBJECTIVE/OBJECTIVE:    HPI    Patient presents for acute visit for follow-up on right lower extremity cellulitis. He reports feeling much improved overall with taking course of Bactrim as prescribed. There is no reported right lower extremity tenderness, erythema, or increased warmth. He reports persistent swelling but admits to not using his compression hose as prescribed on a daily basis. There are no reported open areas of skin or any areas of weeping or bleeding. He denies any fever/chills/sweats, nausea/vomiting, or diarrhea. Patient reports continued elevated home blood glucose values ranging from 200s to 300s since the most recent office visit. He denies any polyuria or polydipsia, new onset vision changes, or new onset paresthesias. He denies adhering to any specific lower carbohydrate diet, denies any routine exercise outside of his normal day-to-day activity, and denies remaining consistent with daily schedules and mealtimes. \"I have things to do at work that can't wait. \"      Current Outpatient Medications on File Prior to Visit   Medication Sig Dispense Refill    hydroCHLOROthiazide (HYDRODIURIL) 12.5 MG tablet TAKE 1 TABLET BY MOUTH ONCE DAILY      sulfamethoxazole-trimethoprim (BACTRIM DS) 800-160 MG per tablet Take 1 tablet by mouth 2 times daily for 10 days 20 tablet 0  mirabegron (MYRBETRIQ) 50 MG TB24 Take 50 mg by mouth daily 90 tablet 3    Beclomethasone Diprop Monohyd (BECONASE AQ NA) by Nasal route 2 times daily       polyethylene glycol (GLYCOLAX) powder Take 17 g by mouth daily      rosuvastatin (CRESTOR) 20 MG tablet Take 20 mg by mouth every evening       ezetimibe (ZETIA) 10 MG tablet Take 10 mg by mouth daily Indications: takes 1-2 times per week       Lancets MISC Test five times daily. 200 each 5    aspirin 81 MG tablet Take 81 mg by mouth daily      Insulin Syringes, Disposable, U-100 1 ML MISC Use qid for dosing insulin as directed. Dx 250.00 400 each 3    ULTICARE INSULIN SYRINGE 30G X 1/2\" 1 ML MISC USE 4 TIMES DAILY FOR DOSING INSULIN AS DIRECTED 300 each 3    XARELTO 20 MG TABS tablet       Docusate Sodium (COLACE PO) Take 400 mg by mouth every evening       metoprolol (TOPROL XL) 50 MG XL tablet Take 50 mg by mouth nightly        No current facility-administered medications on file prior to visit. Allergies   Allergen Reactions    Atorvastatin Other (See Comments)     Muscle ache    Shellfish Allergy Itching, Rash and Swelling    Simvastatin Other (See Comments)     cough    Seasonal      Dandelions / maple blossoms cause sinus sx    Pcn [Penicillins] Hives and Rash     Caused eczema as child        Review of Systems   Constitutional: Negative for appetite change, chills, diaphoresis, fatigue, fever and unexpected weight change. Respiratory: Negative for chest tightness and shortness of breath. Cardiovascular: Positive for leg swelling. Negative for chest pain and palpitations. Gastrointestinal: Negative for abdominal pain, diarrhea, nausea and vomiting. Endocrine: Negative for cold intolerance, heat intolerance, polydipsia, polyphagia and polyuria. Skin: Negative for rash and wound. Neurological: Negative for syncope and light-headedness.        Vitals: BP (!) 110/52 (Site: Left Upper Arm, Position: Sitting, Cuff Size: Large Adult)   Pulse 63   Temp 96.5 °F (35.8 °C) (Temporal)   Resp 16   Ht 5' 11\" (1.803 m)   Wt 243 lb 12.8 oz (110.6 kg)   SpO2 99%   BMI 34.00 kg/m²     Physical Exam  Vitals signs reviewed. Constitutional:       General: He is not in acute distress. Appearance: He is not ill-appearing or toxic-appearing. Cardiovascular:      Pulses:           Dorsalis pedis pulses are 2+ on the right side and 2+ on the left side. Posterior tibial pulses are 2+ on the right side and 2+ on the left side. Musculoskeletal:        Feet:    Feet:      Right foot:      Protective Sensation: 10 sites tested. 7 sites sensed. Skin integrity: Callus and dry skin present. No ulcer, blister, skin breakdown, erythema, warmth or fissure. Toenail Condition: Right toenails are abnormally thick. Fungal disease present. Left foot:      Protective Sensation: 10 sites tested. 7 sites sensed. Skin integrity: Callus and dry skin present. No ulcer, blister, skin breakdown, erythema, warmth or fissure. Toenail Condition: Left toenails are abnormally thick. Fungal disease present. Neurological:      Mental Status: He is alert. Sensory: Sensory deficit present. Comments: Foot monofilament testing POSITIVE for neuropathy bilaterally           Ortho Exam (If Applicable)         On this date 01/29/21 I have spent 36 minutes reviewing previous notes, test results and face to face with the patient discussing the diagnosis and importance of compliance with the treatment plan as well as documenting on the day of the visit. An electronic signature was used to authenticate this note.      Carrie Palacios MD

## 2021-01-29 NOTE — ASSESSMENT & PLAN NOTE
Patient with continued uncontrolled blood glucose values at home and most recent A1c above goal control. I stressed with him the importance of doing his best to keep a regimented schedule with regular mealtimes and equally portioned meals for breakfast, lunch, and dinner. I stressed with him the importance of a lower carbohydrate diet and the benefits of routine exercise as he is able outside of normal day-to-day activity. I stressed with him the need to follow-up with endocrinology to discuss further medication dose adjustments for better blood glucose control.   I will have front office staff help him schedule a visit with the specialist

## 2021-01-29 NOTE — ASSESSMENT & PLAN NOTE
I reviewed with patient the need for podiatry management long-term to help with toenail clipping and care of his feet. I reviewed with him the increased risk for infection to the feet and lower extremities based on his noted onychomycosis to all toenails. Patient was given a referral to podiatry and instructed to schedule a visit.

## 2021-01-29 NOTE — ASSESSMENT & PLAN NOTE
Stable. I stressed with patient the importance of tight blood sugar control to help lessen the risk of further worsening over time.

## 2021-01-29 NOTE — ASSESSMENT & PLAN NOTE
Clinically resolving over time with course of Bactrim. Patient to finish full course of antibiotic as directed and return for any persistent pain, streaking erythema, worsening swelling, or increased warmth.

## 2021-03-05 ENCOUNTER — HOSPITAL ENCOUNTER (OUTPATIENT)
Dept: LAB | Age: 71
Discharge: HOME | End: 2021-03-05
Payer: MEDICARE

## 2021-03-05 DIAGNOSIS — E87.5 HYPERKALEMIA: ICD-10-CM

## 2021-03-05 LAB — POTASSIUM SERPL-SCNC: 4.9 MEQ/L (ref 3.4–4.9)

## 2021-03-05 PROCEDURE — 84132 ASSAY OF SERUM POTASSIUM: CPT

## 2021-03-05 PROCEDURE — 36415 COLL VENOUS BLD VENIPUNCTURE: CPT

## 2021-03-05 NOTE — RESULT ENCOUNTER NOTE
Please notify patient that recent repeat potassium level was within normal range.  No further testing is needed

## 2021-03-15 ENCOUNTER — HOSPITAL ENCOUNTER (OUTPATIENT)
Dept: LAB | Age: 71
Discharge: HOME | End: 2021-03-15
Payer: MEDICARE

## 2021-03-15 LAB
ALBUMIN SERPL-MCNC: 3.9 G/DL (ref 3.5–4.6)
ALBUMIN SERPL-MCNC: 3.9 G/DL (ref 3.5–4.6)
ALP BLD-CCNC: 77 U/L (ref 35–104)
ALP BLD-CCNC: 77 U/L (ref 35–104)
ALT SERPL-CCNC: 16 U/L (ref 0–41)
ALT SERPL-CCNC: 17 U/L (ref 0–41)
ANION GAP SERPL CALCULATED.3IONS-SCNC: 9 MEQ/L (ref 9–15)
AST SERPL-CCNC: 18 U/L (ref 0–40)
AST SERPL-CCNC: 21 U/L (ref 0–40)
BILIRUB SERPL-MCNC: 0.5 MG/DL (ref 0.2–0.7)
BILIRUB SERPL-MCNC: 0.5 MG/DL (ref 0.2–0.7)
BILIRUBIN DIRECT: <0.2 MG/DL (ref 0–0.4)
BILIRUBIN, INDIRECT: NORMAL MG/DL (ref 0–0.6)
BUN BLDV-MCNC: 17 MG/DL (ref 8–23)
CALCIUM SERPL-MCNC: 9.1 MG/DL (ref 8.5–9.9)
CHLORIDE BLD-SCNC: 101 MEQ/L (ref 95–107)
CHOLESTEROL, TOTAL: 99 MG/DL (ref 0–199)
CO2: 27 MEQ/L (ref 20–31)
CREAT SERPL-MCNC: 0.94 MG/DL (ref 0.7–1.2)
GFR AFRICAN AMERICAN: >60
GFR NON-AFRICAN AMERICAN: >60
GLOBULIN: 2.7 G/DL (ref 2.3–3.5)
GLUCOSE BLD-MCNC: 224 MG/DL (ref 70–99)
HCT VFR BLD CALC: 41.7 % (ref 42–52)
HDLC SERPL-MCNC: 45 MG/DL (ref 40–59)
HEMOGLOBIN: 13.9 G/DL (ref 14–18)
INR BLD: 1.1
LDL CHOLESTEROL CALCULATED: 27 MG/DL (ref 0–129)
MCH RBC QN AUTO: 31.1 PG (ref 27–31.3)
MCHC RBC AUTO-ENTMCNC: 33.4 % (ref 33–37)
MCV RBC AUTO: 93.4 FL (ref 80–100)
PDW BLD-RTO: 14.1 % (ref 11.5–14.5)
PLATELET # BLD: 127 K/UL (ref 130–400)
POTASSIUM SERPL-SCNC: 5 MEQ/L (ref 3.4–4.9)
PROTHROMBIN TIME: 13.9 SEC (ref 12.3–14.9)
RBC # BLD: 4.47 M/UL (ref 4.7–6.1)
SODIUM BLD-SCNC: 137 MEQ/L (ref 135–144)
TOTAL PROTEIN: 6.6 G/DL (ref 6.3–8)
TOTAL PROTEIN: 6.6 G/DL (ref 6.3–8)
TRIGL SERPL-MCNC: 136 MG/DL (ref 0–150)
WBC # BLD: 3.4 K/UL (ref 4.8–10.8)

## 2021-03-15 PROCEDURE — 36415 COLL VENOUS BLD VENIPUNCTURE: CPT

## 2021-03-15 PROCEDURE — 80061 LIPID PANEL: CPT

## 2021-03-15 PROCEDURE — 85610 PROTHROMBIN TIME: CPT

## 2021-03-15 PROCEDURE — 80053 COMPREHEN METABOLIC PANEL: CPT

## 2021-03-15 PROCEDURE — 85027 COMPLETE CBC AUTOMATED: CPT

## 2021-03-15 RX ORDER — BLOOD SUGAR DIAGNOSTIC
STRIP MISCELLANEOUS
Qty: 200 EACH | Refills: 0 | Status: SHIPPED | OUTPATIENT
Start: 2021-03-15 | End: 2021-07-23 | Stop reason: SDUPTHER

## 2021-03-18 ENCOUNTER — TELEPHONE (OUTPATIENT)
Dept: ENDOCRINOLOGY | Age: 71
End: 2021-03-18

## 2021-03-18 NOTE — TELEPHONE ENCOUNTER
[address] [facility]  calling to inform you that a PA needs to be completed for RX of test strips. Please advise.

## 2021-04-09 RX ORDER — FLASH GLUCOSE SENSOR
KIT MISCELLANEOUS
Qty: 2 EACH | Refills: 0 | Status: SHIPPED | OUTPATIENT
Start: 2021-04-09 | End: 2021-07-23 | Stop reason: SDUPTHER

## 2021-05-27 ENCOUNTER — TELEPHONE (OUTPATIENT)
Dept: FAMILY MEDICINE CLINIC | Age: 71
End: 2021-05-27

## 2021-05-27 NOTE — TELEPHONE ENCOUNTER
Pt made aware. Pt states specialist is not changing any of his medication and he is basically managing his diabetes on his own, he wanted to let Dr. Trinh Ventura know this.

## 2021-05-27 NOTE — TELEPHONE ENCOUNTER
Patient called upset that he has to see Dr. Amelia Sanchez for his diabetes and prefers that you manage it in the office. He states that he does not have the time to go to the specialist office and waste 4 hours of his day. I did explain to him that Dr. Almetta Kawasaki refers out to specialist that specialize in their field of care, and  can better manage chronic diseases. He would like an answer to if Dr. Almetta Kawasaki will manage his diabetes or not.   He can be reached at [phone]. sb

## 2021-05-27 NOTE — TELEPHONE ENCOUNTER
Patient has uncontrolled type 1 diabetes with neuropathy and several high risk medical conditions like Afib and heart disease. He is on sliding scale insulin. He needs to be followed long term by endocrinology to help with medication adjustments and better glucose control.

## 2021-06-03 ENCOUNTER — OFFICE VISIT (OUTPATIENT)
Dept: FAMILY MEDICINE CLINIC | Age: 71
End: 2021-06-03
Payer: MEDICARE

## 2021-06-03 VITALS
DIASTOLIC BLOOD PRESSURE: 58 MMHG | WEIGHT: 240.6 LBS | OXYGEN SATURATION: 96 % | SYSTOLIC BLOOD PRESSURE: 120 MMHG | HEART RATE: 75 BPM | BODY MASS INDEX: 33.68 KG/M2 | HEIGHT: 71 IN | TEMPERATURE: 96.2 F | RESPIRATION RATE: 14 BRPM

## 2021-06-03 DIAGNOSIS — B35.1 ONYCHOMYCOSIS OF MULTIPLE TOENAILS WITH TYPE 1 DIABETES MELLITUS AND PERIPHERAL NEUROPATHY (HCC): ICD-10-CM

## 2021-06-03 DIAGNOSIS — I48.0 PAROXYSMAL ATRIAL FIBRILLATION (HCC): Chronic | ICD-10-CM

## 2021-06-03 DIAGNOSIS — R80.9 MICROALBUMINURIA DUE TO TYPE 1 DIABETES MELLITUS (HCC): ICD-10-CM

## 2021-06-03 DIAGNOSIS — E10.42 POLYNEUROPATHY DUE TO TYPE 1 DIABETES MELLITUS (HCC): ICD-10-CM

## 2021-06-03 DIAGNOSIS — I10 ESSENTIAL HYPERTENSION: Chronic | ICD-10-CM

## 2021-06-03 DIAGNOSIS — E10.69 ONYCHOMYCOSIS OF MULTIPLE TOENAILS WITH TYPE 1 DIABETES MELLITUS AND PERIPHERAL NEUROPATHY (HCC): ICD-10-CM

## 2021-06-03 DIAGNOSIS — E10.42 ONYCHOMYCOSIS OF MULTIPLE TOENAILS WITH TYPE 1 DIABETES MELLITUS AND PERIPHERAL NEUROPATHY (HCC): ICD-10-CM

## 2021-06-03 DIAGNOSIS — I25.10 CORONARY ARTERY DISEASE INVOLVING NATIVE CORONARY ARTERY OF NATIVE HEART WITHOUT ANGINA PECTORIS: Chronic | ICD-10-CM

## 2021-06-03 DIAGNOSIS — E10.29 MICROALBUMINURIA DUE TO TYPE 1 DIABETES MELLITUS (HCC): ICD-10-CM

## 2021-06-03 DIAGNOSIS — N31.8 HYPERACTIVITY OF BLADDER: ICD-10-CM

## 2021-06-03 DIAGNOSIS — E10.9 TYPE 1 DIABETES MELLITUS ON INSULIN THERAPY (HCC): Primary | ICD-10-CM

## 2021-06-03 DIAGNOSIS — E78.2 MIXED HYPERLIPIDEMIA: Chronic | ICD-10-CM

## 2021-06-03 LAB — HBA1C MFR BLD: 8.2 %

## 2021-06-03 PROCEDURE — 3052F HG A1C>EQUAL 8.0%<EQUAL 9.0%: CPT | Performed by: FAMILY MEDICINE

## 2021-06-03 PROCEDURE — 99214 OFFICE O/P EST MOD 30 MIN: CPT | Performed by: FAMILY MEDICINE

## 2021-06-03 PROCEDURE — 83036 HEMOGLOBIN GLYCOSYLATED A1C: CPT | Performed by: FAMILY MEDICINE

## 2021-06-03 RX ORDER — PREGABALIN 150 MG/1
150 CAPSULE ORAL 3 TIMES DAILY
Qty: 270 CAPSULE | Refills: 0 | Status: SHIPPED | OUTPATIENT
Start: 2021-07-01 | End: 2021-07-23 | Stop reason: SDUPTHER

## 2021-06-03 SDOH — ECONOMIC STABILITY: FOOD INSECURITY: WITHIN THE PAST 12 MONTHS, THE FOOD YOU BOUGHT JUST DIDN'T LAST AND YOU DIDN'T HAVE MONEY TO GET MORE.: NEVER TRUE

## 2021-06-03 SDOH — ECONOMIC STABILITY: FOOD INSECURITY: WITHIN THE PAST 12 MONTHS, YOU WORRIED THAT YOUR FOOD WOULD RUN OUT BEFORE YOU GOT MONEY TO BUY MORE.: NEVER TRUE

## 2021-06-03 ASSESSMENT — ENCOUNTER SYMPTOMS
VOMITING: 0
WHEEZING: 0
DIARRHEA: 0
COUGH: 0
CONSTIPATION: 0
NAUSEA: 0
SHORTNESS OF BREATH: 0
ANAL BLEEDING: 0
BLOOD IN STOOL: 0
CHEST TIGHTNESS: 0
ABDOMINAL PAIN: 0

## 2021-06-03 ASSESSMENT — SOCIAL DETERMINANTS OF HEALTH (SDOH): HOW HARD IS IT FOR YOU TO PAY FOR THE VERY BASICS LIKE FOOD, HOUSING, MEDICAL CARE, AND HEATING?: NOT HARD AT ALL

## 2021-06-03 NOTE — PROGRESS NOTES
Zeeshan Fernandez (: 1950) is a 66 y.o. male, Established patient, who presents today for:    Chief Complaint   Patient presents with    Diabetes     Pt presents today for a routine chronic disease check.  Hyperlipidemia    Hypertension         ASSESSMENT/PLAN    1. Type 1 diabetes mellitus on insulin therapy [facility])  Assessment & Plan:  Patient with uncontrolled diabetes based on A1c value today in the office and home blood sugar values reported. I stressed with him again the importance of doing his best to keep a regimented schedule with regular mealtimes and equally portioned meals for breakfast, lunch, and dinner. I stressed with him the importance of a lower carbohydrate diet and the benefits of routine exercise outside of his normal day-to-day activity. I stressed with him at length the need to follow-up with endocrinology office to discuss further medication dose adjustments for better blood glucose control. Patient is aware of the risks of serious morbidity and even mortality with continued uncontrolled blood glucose and worsening blood sugar values over time.   He remains overdue for diabetic eye examination, and reports being seen by Dr. Иван Barrera in the past.  I urged him again to schedule a visit with ophthalmology/optometry for this routine diabetic eye exam.  Orders:  -     POCT glycosylated hemoglobin (Hb A1C)  -     insulin regular (NOVOLIN R RELION) 100 UNIT/ML injection; INJECT SUBCUTANEOUSLY THREE TIMES DAILY UP  UNITS DAILY AS NEEDED, Disp-30 mL, R-0Normal  -     insulin NPH (NOVOLIN N) 100 UNIT/ML injection vial; 30 units before breakfast, 40 units with supper, 15 units at bed time, Disp-8 vial, R-5Normal  -     insulin aspart (NOVOLOG) 100 UNIT/ML injection vial; INJECT 25 UNITS INTO THE SKIN THREE TIMES DAILY (BEFORE MEALS, SLIDING SCALE IN THE MORNING AND AT BEDTIME AS DIRECTED) diagnosis code: E10.9, Disp-30 mL, R-3Normal  -     Hemoglobin A1C; Future  -     Comprehensive diabetes, atrial fibrillation, hypertension, hyperlipidemia, and coronary artery disease follow-up. He remains established with endocrinology for long-term follow-up of blood glucose management in the setting of type 1 diabetes, but reports not being seen in over the past year. Patient reports taking medications as prescribed since the most recent visit. They deny adhering to any specific lower carbohydrate or lower salt diet. They deny any routine exercise outside of normal day-to-day activity. There is reported variability in work schedule causing patient to miss meals or take insulin later than intended. Patient reports home blood glucose values ranging from 50s to 270s since the most recent office visit. They deny any polyuria or polydipsia, new onset vision changes, or new onset paresthesias. Patient denies any chest pain, dyspnea, palpitations, lightheadedness, dizziness, worsening lower extremity edema, or syncope. Patient denies any dyspnea at rest, persistent wheezing, worsening cough, chest tightness, or limitation in normal day-to-day activity due to breathing.     Current Outpatient Medications on File Prior to Visit   Medication Sig Dispense Refill    irbesartan (AVAPRO) 300 MG tablet Take 1 tablet by mouth daily 90 tablet 1    Continuous Blood Gluc Sensor (FREESTYLE NUBIA 14 DAY SENSOR) MISC CHANGE EVERY 14 DAYS 2 each 0    celecoxib (CELEBREX) 200 MG capsule Take 1 capsule by mouth daily 90 capsule 1    blood glucose test strips (ONETOUCH VERIO) strip USE 1 STRIP TO CHECK GLUCOSE FIVE TIMES DAILY AS NEEDED 200 each 0    hydroCHLOROthiazide (HYDRODIURIL) 12.5 MG tablet TAKE 1 TABLET BY MOUTH ONCE DAILY      tadalafil (CIALIS) 5 MG tablet Take 1 tablet by mouth daily 90 tablet 3    clindamycin (CLEOCIN) 300 MG capsule TAKE TWO CAPSULES BY MOUTH ONE HOUR BEFORE APPOINTMENT      Continuous Blood Gluc  (FREESTYLE NUBIA 14 DAY READER) XU Please give 1 reader 1 Device 0    metFORMIN

## 2021-06-03 NOTE — ASSESSMENT & PLAN NOTE
Well-controlled without documented signs of recurrence since 2013. Anticoagulation has been discontinued by cardiology and patient has an implanted loop recorder to confirm that he is not having any paroxysmal fibrillation episodes. Follow-up evaluation by cardiology as scheduled in the next 1 to 2 months.

## 2021-06-03 NOTE — TELEPHONE ENCOUNTER
Pharmacy called about pt insulin medication needs to know the max amount of units with sliding scale.

## 2021-06-08 DIAGNOSIS — G63 POLYNEUROPATHY ASSOCIATED WITH UNDERLYING DISEASE (HCC): ICD-10-CM

## 2021-06-08 RX ORDER — FLASH GLUCOSE SENSOR
KIT MISCELLANEOUS
Qty: 2 EACH | Refills: 0 | OUTPATIENT
Start: 2021-06-08

## 2021-06-08 RX ORDER — DULOXETIN HYDROCHLORIDE 60 MG/1
CAPSULE, DELAYED RELEASE ORAL
Qty: 90 CAPSULE | Refills: 3 | OUTPATIENT
Start: 2021-06-08

## 2021-07-23 ENCOUNTER — HOSPITAL ENCOUNTER (OUTPATIENT)
Age: 71
Setting detail: SPECIMEN
Discharge: HOME | End: 2021-07-23
Payer: MEDICARE

## 2021-07-23 ENCOUNTER — TELEPHONE (OUTPATIENT)
Dept: FAMILY MEDICINE CLINIC | Age: 71
End: 2021-07-23

## 2021-07-23 ENCOUNTER — OFFICE VISIT (OUTPATIENT)
Dept: FAMILY MEDICINE CLINIC | Age: 71
End: 2021-07-23
Payer: MEDICARE

## 2021-07-23 VITALS
RESPIRATION RATE: 16 BRPM | OXYGEN SATURATION: 99 % | SYSTOLIC BLOOD PRESSURE: 104 MMHG | DIASTOLIC BLOOD PRESSURE: 60 MMHG | HEIGHT: 71 IN | HEART RATE: 66 BPM | BODY MASS INDEX: 33.32 KG/M2 | TEMPERATURE: 97.6 F | WEIGHT: 238 LBS

## 2021-07-23 DIAGNOSIS — E10.42 POLYNEUROPATHY DUE TO TYPE 1 DIABETES MELLITUS (HCC): ICD-10-CM

## 2021-07-23 DIAGNOSIS — Z79.899 HIGH RISK MEDICATION USE: ICD-10-CM

## 2021-07-23 DIAGNOSIS — E78.2 MIXED HYPERLIPIDEMIA: ICD-10-CM

## 2021-07-23 DIAGNOSIS — I25.10 CORONARY ARTERY DISEASE INVOLVING NATIVE CORONARY ARTERY OF NATIVE HEART WITHOUT ANGINA PECTORIS: ICD-10-CM

## 2021-07-23 DIAGNOSIS — Z91.81 AT HIGH RISK FOR FALLS: ICD-10-CM

## 2021-07-23 DIAGNOSIS — I10 ESSENTIAL HYPERTENSION: ICD-10-CM

## 2021-07-23 DIAGNOSIS — N40.1 BPH ASSOCIATED WITH NOCTURIA: ICD-10-CM

## 2021-07-23 DIAGNOSIS — R53.83 OTHER FATIGUE: ICD-10-CM

## 2021-07-23 DIAGNOSIS — I48.0 PAROXYSMAL ATRIAL FIBRILLATION (HCC): ICD-10-CM

## 2021-07-23 DIAGNOSIS — N31.8 HYPERACTIVITY OF BLADDER: ICD-10-CM

## 2021-07-23 DIAGNOSIS — E10.9 TYPE 1 DIABETES MELLITUS ON INSULIN THERAPY (HCC): Primary | ICD-10-CM

## 2021-07-23 DIAGNOSIS — R35.1 BPH ASSOCIATED WITH NOCTURIA: ICD-10-CM

## 2021-07-23 PROCEDURE — 99214 OFFICE O/P EST MOD 30 MIN: CPT | Performed by: NURSE PRACTITIONER

## 2021-07-23 PROCEDURE — 80307 DRUG TEST PRSMV CHEM ANLYZR: CPT

## 2021-07-23 PROCEDURE — 3052F HG A1C>EQUAL 8.0%<EQUAL 9.0%: CPT | Performed by: NURSE PRACTITIONER

## 2021-07-23 RX ORDER — BLOOD SUGAR DIAGNOSTIC
STRIP MISCELLANEOUS
Qty: 200 EACH | Refills: 0 | Status: SHIPPED | OUTPATIENT
Start: 2021-07-23 | End: 2021-09-10 | Stop reason: SDUPTHER

## 2021-07-23 RX ORDER — PREGABALIN 150 MG/1
150 CAPSULE ORAL 3 TIMES DAILY
Qty: 270 CAPSULE | Refills: 0 | Status: SHIPPED | OUTPATIENT
Start: 2021-07-23 | End: 2022-01-28 | Stop reason: SDUPTHER

## 2021-07-23 RX ORDER — FLASH GLUCOSE SCANNING READER
EACH MISCELLANEOUS
Qty: 1 DEVICE | Refills: 0 | Status: SHIPPED | OUTPATIENT
Start: 2021-07-23 | End: 2021-11-26 | Stop reason: SDUPTHER

## 2021-07-23 ASSESSMENT — PATIENT HEALTH QUESTIONNAIRE - PHQ9
2. FEELING DOWN, DEPRESSED OR HOPELESS: 0
SUM OF ALL RESPONSES TO PHQ QUESTIONS 1-9: 0
1. LITTLE INTEREST OR PLEASURE IN DOING THINGS: 0
SUM OF ALL RESPONSES TO PHQ9 QUESTIONS 1 & 2: 0

## 2021-07-23 NOTE — TELEPHONE ENCOUNTER
[address] Needs max number of use per day for insulin NPH (NOVOLIN N) 100 UNIT/ML Please advise

## 2021-07-23 NOTE — PROGRESS NOTES
Madi Boland is making the first visit to the office to establish. The primary issue is     Diabetes: diagnosed 30 years ago. He states that he has been on his current regimen of insulin for quite some time now and this has been working well for him. He brought a paper with him explaining his medications and doses. He states that he occasionally does get hypoglycemic episodes sometimes down to around 40 but this quickly improves with sugar. He always carries some form of candy with him and is aware of how to recognize the symptoms early. He states that he has been taking Lyrica for quite some time now for significant neuropathy symptoms. The medication is helpful in reducing severity of pain and he has not had any side effects. States that the pain is in both of his feet and can be very severe at times. Hypertension/dyslipidemia/CAD: He states that he continues to follow with [facility] cardiology. States that he has never had a have any heart stents or heart surgery in the past.  He denies any recent cardiac symptoms. No lightheadedness or dizziness. He has been taking medication as prescribed with no side effects. BPH: He states that he has had some issues with urination in the past as well as hyperactivity of the bladder. He has been taking medication with no issues and states that he has not had any recent problems with urination. No hematuria or dysuria. No flank pain. Atrial fibrillation. Dr. Lynn Tim and Dr. Leann Bridges. He is currently not taking any anticoagulant medication but used to take Xarelto. Currently has a loop recorder in place. Has been told most recently that there has been no sustained atrial fibrillation in recent weeks. He will follow up with cardiology again in the next month or so. He is currently only taking baby aspirin. No abnormal bruising or bleeding.   No new neurological symptoms reported        Patient Active Problem List   Diagnosis    Obesity    Pancytopenia ([address]    Hypertension    Hyperlipidemia    CAD (coronary artery disease)    ED (erectile dysfunction)    GERD (gastroesophageal reflux disease)    Hyperopia of both eyes with astigmatism and presbyopia    Nuclear sclerotic cataract of right eye    Hyperactivity of bladder    Paroxysmal atrial fibrillation (HCC)    Microalbuminuria due to type 1 diabetes mellitus ([address]    Chronic progressive paraparesis (HCC)    Type 1 diabetes mellitus on insulin therapy ([address]    Lumbar stenosis with neurogenic claudication    Polyneuropathy associated with underlying disease ([address]    Polyneuropathy due to type 1 diabetes mellitus ([address]    Cellulitis of right lower extremity    Bilateral lower extremity edema    Onychomycosis of multiple toenails with type 1 diabetes mellitus and peripheral neuropathy ([address]       Prior to Admission medications    Medication Sig Start Date End Date Taking? Authorizing Provider   insulin regular (NOVOLIN R RELION) 100 UNIT/ML injection INJECT SUBCUTANEOUSLY THREE TIMES DAILY UP  UNITS DAILY AS NEEDED 7/23/21  Yes KANE Samuel CNP   insulin aspart (NOVOLOG) 100 UNIT/ML injection vial INJECT 25 UNITS INTO THE SKIN THREE TIMES DAILY (BEFORE MEALS, SLIDING SCALE IN THE MORNING AND AT BEDTIME AS DIRECTED) diagnosis code: E10.9 7/23/21  Yes KANE Ortiz CNP   blood glucose test strips (ONETOUCH VERIO) strip USE 1 STRIP TO CHECK GLUCOSE FIVE TIMES DAILY AS NEEDED 7/23/21  Yes KANE Ortiz CNP   mirabegron (MYRBETRIQ) 50 MG TB24 Take 50 mg by mouth daily 7/23/21  Yes KANE Samuel CNP   pregabalin (LYRICA) 150 MG capsule Take 1 capsule by mouth 3 times daily for 90 days.  7/23/21 10/21/21 Yes KANE Ortiz CNP   Continuous Blood Gluc  (FREESTYLE NUBIA 14 DAY READER) XU Please give 1 reader 7/23/21  Yes KANE Ortiz CNP   metFORMIN (GLUCOPHAGE) 1000 MG tablet Take 1 tablet by mouth 2 times daily (with meals) 7/23/21  Yes Jorge Alvarez, APRN - CNP   DULoxetine (CYMBALTA) 60 MG extended release capsule Take 1 capsule by mouth daily 6/8/21  Yes Nadine Kelly MD   insulin NPH (NOVOLIN N) 100 UNIT/ML injection vial 30 units before breakfast, 40 units with supper, 15 units at bed time 6/3/21  Yes Nadine Kelly MD   irbesartan (AVAPRO) 300 MG tablet Take 1 tablet by mouth daily 5/7/21  Yes Nadine Kelly MD   celecoxib (CELEBREX) 200 MG capsule Take 1 capsule by mouth daily 4/1/21  Yes Nadine Kelly MD   tadalafil (CIALIS) 5 MG tablet Take 1 tablet by mouth daily 1/22/21  Yes Nadine Kelly MD   clindamycin (CLEOCIN) 300 MG capsule TAKE TWO CAPSULES BY MOUTH ONE HOUR BEFORE APPOINTMENT 6/10/20  Yes Historical Provider, MD   acetaminophen (TYLENOL) 500 MG tablet Take 1,000 mg by mouth every 6 hours as needed for Pain   Yes Historical Provider, MD   Beclomethasone Diprop Monohyd (BECONASE AQ NA) by Nasal route 2 times daily    Yes Historical Provider, MD   polyethylene glycol (GLYCOLAX) powder Take 17 g by mouth daily   Yes Historical Provider, MD   rosuvastatin (CRESTOR) 20 MG tablet Take 20 mg by mouth every evening  12/11/13  Yes Historical Provider, MD   ezetimibe (ZETIA) 10 MG tablet Take 10 mg by mouth daily Indications: takes 1-2 times per week    Yes Historical Provider, MD   Lancets MISC Test five times daily. 1/11/18  Yes Lauren Moreno MD   aspirin 81 MG tablet Take 81 mg by mouth daily   Yes Historical Provider, MD   Insulin Syringes, Disposable, U-100 1 ML MISC Use qid for dosing insulin as directed.  Dx 250.00 11/14/17  Yes MD Juana Ruff INSULIN SYRINGE 30G X 1/2\" 1 ML MISC USE 4 TIMES DAILY FOR DOSING INSULIN AS DIRECTED 10/18/14  Yes Lauren Moreno MD   Docusate Sodium (COLACE PO) Take 400 mg by mouth every evening    Yes Historical Provider, MD   metoprolol (TOPROL XL) 50 MG XL tablet Take 50 mg by mouth nightly    Yes Historical Provider, MD   hydroCHLOROthiazide fishing, boating, vegetable garden, cooking     Social Determinants of Health     Financial Resource Strain: Low Risk     Difficulty of Paying Living Expenses: Not hard at all   Food Insecurity: No Food Insecurity    Worried About Running Out of Food in the Last Year: Never true    [address] in the Last Year: Never true   Transportation Needs:     Lack of Transportation (Medical):  Lack of Transportation (Non-Medical):    Physical Activity:     Days of Exercise per Week:     Minutes of Exercise per Session:    Stress:     Feeling of Stress :    Social Connections:     Frequency of Communication with Friends and Family:     Frequency of Social Gatherings with Friends and Family:     Attends Jainism Services:     Active Member of Clubs or Organizations:     Attends Club or Organization Meetings:     Marital Status:    Intimate Partner Violence:     Fear of Current or Ex-Partner:     Emotionally Abused:     Physically Abused:     Sexually Abused:        Family History   Problem Relation Age of Onset    Diabetes Mother     High Blood Pressure Mother     High Cholesterol Mother     Heart Disease Mother     Other Mother         recurrent UTI    Cancer Father         esophagus (smoker)    No Known Problems Sister     Diabetes Brother     High Cholesterol Brother     Macular Degen Brother     Heart Attack Maternal Grandfather 76    Diabetes Paternal Grandmother     Heart Attack Paternal Grandfather 71    Thyroid Disease Daughter     Other Daughter         GI & sinus issues    No Known Problems Daughter        EXAM:  Constitutional Blood pressure 104/60, pulse 66, temperature 97.6 °F (36.4 °C), temperature source Temporal, resp. rate 16, height 5' 11\" (1.803 m), weight 238 lb (108 kg), SpO2 99 %. .  He has a normal affect, no acute distress, appears well developed and well nourished. Neck:  neck- supple, no mass, non-tender and no bruits  Lungs:  Normal expansion.   Clear to auscultation. No rales, rhonchi, or wheezing., No chest wall tenderness. Heart:  Heart sounds are normal.  Regular rate and rhythm without murmur, gallop or rub. Abdomen:  Soft, non-tender, normal bowel sounds. No bruits, organomegaly or masses. Extremities: Extremities warm to touch, pink, with no edema. DIAGNOSIS:    Diagnosis Orders   1. Type 1 diabetes mellitus on insulin therapy (HCC)  insulin regular (NOVOLIN R RELION) 100 UNIT/ML injection    insulin aspart (NOVOLOG) 100 UNIT/ML injection vial    CBC Auto Differential    Comprehensive Metabolic Panel    Lipid Panel    Hemoglobin A1C    Microalbumin / Creatinine Urine Ratio   2. DM w/ coma type I, uncontrolled ([facility]  blood glucose test strips (ONETOUCH VERIO) strip    metFORMIN (GLUCOPHAGE) 1000 MG tablet   3. Polyneuropathy due to type 1 diabetes mellitus (HCC)  pregabalin (LYRICA) 150 MG capsule   4. Hyperactivity of bladder  mirabegron (MYRBETRIQ) 50 MG TB24    Well controlled, continue current treatment. 5. High risk medication use  Pain Management Drug Screen   6. At high risk for falls     7. Essential hypertension     8. Mixed hyperlipidemia     9. Coronary artery disease involving native coronary artery of native heart without angina pectoris     10. BPH associated with nocturia     11. Paroxysmal atrial fibrillation (HCC)     12. Other fatigue  TSH without Reflex    T4, Free       PLAN: Include orders in the DX section. Follow up: 2 months and as needed. Blood work one week prior as ordered. 1.  2.  3.  He will continue current insulin regimen and we will plan for blood work to be done in September. Last hemoglobin A1c done in June was 8.2. He has made some dietary changes and has been getting more physical activity. 4.  10.  No recent urinary symptoms reported while on medication. He will notify me should this change. 5.  Medication contract obtained and urine testing collected for Lyrica.   I can assume this prescription when medication is due. 6.  No evidence of increased fall risk. Patient states that gait has been unchanged for years now. 7.  8.  9.  11.  He continues to follow routinely with cardiology and currently has loop recorder in place. No recent symptoms reported. 12.  We will get thyroid function with next lab. He has been having some increased fatigue lately. Please note this report has been partially produced using speech recognition software and may cause contain errors related to that system including grammar, punctuation and spelling as well as words and phrases that may seem inappropriate. If there are questions or concerns please feel free to contact me to clarify. Electronically signed by KANE Charles CNP-CNP, 12:57 PM 7/23/21    On the basis of positive falls risk screening, assessment and plan is as follows: patient declines any further evaluation/treatment for increased falls risk.

## 2021-07-28 LAB
6-ACETYLMORPHINE: NOT DETECTED
7-AMINOCLONAZEPAM: NOT DETECTED
ALPHA-OH-ALPRAZOLAM: NOT DETECTED
ALPHA-OH-MIDAZOLAM, URINE: NOT DETECTED
ALPRAZOLAM: NOT DETECTED
AMPHETAMINE: NOT DETECTED
BARBITURATES: NOT DETECTED
BENZOYLECGONINE: NOT DETECTED
BUPRENORPHINE: NOT DETECTED
CARISOPRODOL: NOT DETECTED
CLONAZEPAM: NOT DETECTED
CODEINE: NOT DETECTED
CREATININE URINE: 71.3 MG/DL (ref 20–400)
DIAZEPAM: NOT DETECTED
EER PAIN MGT DRUG PANEL, HIGH RES/EMIT U: NORMAL
ETHYL GLUCURONIDE: PRESENT
FENTANYL: NOT DETECTED
GABAPENTIN: NOT DETECTED
HYDROCODONE: NOT DETECTED
HYDROMORPHONE: NOT DETECTED
LORAZEPAM: NOT DETECTED
MARIJUANA METABOLITE: NOT DETECTED
MDA: NOT DETECTED
MDEA: NOT DETECTED
MDMA URINE: NOT DETECTED
MEPERIDINE: NOT DETECTED
METHADONE: NOT DETECTED
METHAMPHETAMINE: NOT DETECTED
METHYLPHENIDATE: NOT DETECTED
MIDAZOLAM: NOT DETECTED
MORPHINE: NOT DETECTED
NALOXONE: NOT DETECTED
NORBUPRENORPHINE, FREE: NOT DETECTED
NORDIAZEPAM: NOT DETECTED
NORFENTANYL: NOT DETECTED
NORHYDROCODONE, URINE: NOT DETECTED
NOROXYCODONE: NOT DETECTED
NOROXYMORPHONE, URINE: NOT DETECTED
OXAZEPAM: NOT DETECTED
OXYCODONE: NOT DETECTED
OXYMORPHONE: NOT DETECTED
PAIN MANAGEMENT DRUG PANEL: NORMAL
PCP: NOT DETECTED
PHENTERMINE: NOT DETECTED
PREGABALIN: PRESENT
TAPENTADOL, URINE: NOT DETECTED
TAPENTADOL-O-SULFATE, URINE: NOT DETECTED
TEMAZEPAM: NOT DETECTED
TRAMADOL: NOT DETECTED
ZOLPIDEM: NOT DETECTED

## 2021-09-03 LAB — DIABETIC RETINOPATHY: NEGATIVE

## 2021-09-10 NOTE — TELEPHONE ENCOUNTER
Pharmacy is requesting medication refill.  Please approve or deny this request.    Rx requested:  Requested Prescriptions     Pending Prescriptions Disp Refills    blood glucose test strips (ONETOUCH VERIO) strip 200 each 0     Sig: USE 1 STRIP TO CHECK GLUCOSE FIVE TIMES DAILY AS NEEDED         Last Office Visit:   6/3/2021      Next Visit Date:  Future Appointments   Date Time Provider Amado Aceves   10/5/2021  8:00 AM FinnishMD [address]

## 2021-09-12 RX ORDER — BLOOD SUGAR DIAGNOSTIC
STRIP MISCELLANEOUS
Qty: 200 EACH | Refills: 1 | Status: SHIPPED | OUTPATIENT
Start: 2021-09-12 | End: 2021-11-26 | Stop reason: SDUPTHER

## 2021-11-04 DIAGNOSIS — E10.9 TYPE 1 DIABETES MELLITUS ON INSULIN THERAPY (HCC): ICD-10-CM

## 2021-11-04 NOTE — TELEPHONE ENCOUNTER
Requesting medication refill. Please approve or deny this request.    Rx requested:  Requested Prescriptions     Pending Prescriptions Disp Refills    insulin regular (NOVOLIN R RELION) 100 UNIT/ML injection [Pharmacy Med Name: Koby Rao 100 UNIT/ML Injection Solution] 30 mL 0     Sig: INJECT SUBCUTANEOUSLY THREE TIMES DAILY UP  UNITS DAILY AS NEEDED       Last Office Visit:   7/23/2021    Last Filled:      Last Labs:      Next Visit Date:  No future appointments.

## 2021-11-12 DIAGNOSIS — I10 ESSENTIAL HYPERTENSION: Chronic | ICD-10-CM

## 2021-11-12 NOTE — TELEPHONE ENCOUNTER
pharmacy requesting medication refill. Please approve or deny this request.    Rx requested:  Requested Prescriptions     Pending Prescriptions Disp Refills    irbesartan (AVAPRO) 300 MG tablet [Pharmacy Med Name: Irbesartan 300 MG Oral Tablet] 90 tablet 0     Sig: Take 1 tablet by mouth once daily         Last Office Visit:   6/3/2021      Next Visit Date:  No future appointments.

## 2021-11-15 RX ORDER — IRBESARTAN 300 MG/1
TABLET ORAL
Qty: 90 TABLET | Refills: 0 | Status: SHIPPED | OUTPATIENT
Start: 2021-11-15

## 2021-11-18 ENCOUNTER — HOSPITAL ENCOUNTER (OUTPATIENT)
Dept: LAB | Age: 71
Discharge: HOME | End: 2021-11-18
Payer: MEDICARE

## 2021-11-18 DIAGNOSIS — E10.9 TYPE 1 DIABETES MELLITUS ON INSULIN THERAPY (HCC): ICD-10-CM

## 2021-11-18 DIAGNOSIS — R53.83 OTHER FATIGUE: ICD-10-CM

## 2021-11-18 LAB
ALBUMIN SERPL-MCNC: 4.1 G/DL (ref 3.5–4.6)
ALP BLD-CCNC: 73 U/L (ref 35–104)
ALT SERPL-CCNC: 21 U/L (ref 0–41)
ANION GAP SERPL CALCULATED.3IONS-SCNC: 10 MEQ/L (ref 9–15)
AST SERPL-CCNC: 19 U/L (ref 0–40)
BASOPHILS ABSOLUTE: 0 K/UL (ref 0–0.2)
BASOPHILS RELATIVE PERCENT: 1.1 %
BILIRUB SERPL-MCNC: 0.3 MG/DL (ref 0.2–0.7)
BUN BLDV-MCNC: 18 MG/DL (ref 8–23)
CALCIUM SERPL-MCNC: 9.5 MG/DL (ref 8.5–9.9)
CHLORIDE BLD-SCNC: 102 MEQ/L (ref 95–107)
CHOLESTEROL, TOTAL: 115 MG/DL (ref 0–199)
CO2: 24 MEQ/L (ref 20–31)
CREAT SERPL-MCNC: 0.95 MG/DL (ref 0.7–1.2)
CREATININE URINE: 82.1 MG/DL
EOSINOPHILS ABSOLUTE: 0.2 K/UL (ref 0–0.7)
EOSINOPHILS RELATIVE PERCENT: 4.8 %
GFR AFRICAN AMERICAN: >60
GFR NON-AFRICAN AMERICAN: >60
GLOBULIN: 2.8 G/DL (ref 2.3–3.5)
GLUCOSE BLD-MCNC: 193 MG/DL (ref 70–99)
HCT VFR BLD CALC: 40.8 % (ref 42–52)
HDLC SERPL-MCNC: 48 MG/DL (ref 40–59)
HEMOGLOBIN: 13.9 G/DL (ref 14–18)
LDL CHOLESTEROL CALCULATED: 46 MG/DL (ref 0–129)
LYMPHOCYTES ABSOLUTE: 0.9 K/UL (ref 1–4.8)
LYMPHOCYTES RELATIVE PERCENT: 23.7 %
MCH RBC QN AUTO: 32.1 PG (ref 27–31.3)
MCHC RBC AUTO-ENTMCNC: 34 % (ref 33–37)
MCV RBC AUTO: 94.5 FL (ref 80–100)
MICROALBUMIN UR-MCNC: 7 MG/DL
MICROALBUMIN/CREAT UR-RTO: 85.3 MG/G (ref 0–30)
MONOCYTES ABSOLUTE: 0.3 K/UL (ref 0.2–0.8)
MONOCYTES RELATIVE PERCENT: 8.1 %
NEUTROPHILS ABSOLUTE: 2.5 K/UL (ref 1.4–6.5)
NEUTROPHILS RELATIVE PERCENT: 62.3 %
PDW BLD-RTO: 13.8 % (ref 11.5–14.5)
PLATELET # BLD: 151 K/UL (ref 130–400)
POTASSIUM SERPL-SCNC: 4.5 MEQ/L (ref 3.4–4.9)
RBC # BLD: 4.32 M/UL (ref 4.7–6.1)
SODIUM BLD-SCNC: 136 MEQ/L (ref 135–144)
T4 FREE: 0.95 NG/DL (ref 0.84–1.68)
TOTAL PROTEIN: 6.9 G/DL (ref 6.3–8)
TRIGL SERPL-MCNC: 103 MG/DL (ref 0–150)
TSH SERPL DL<=0.05 MIU/L-ACNC: 1.98 UIU/ML (ref 0.44–3.86)
WBC # BLD: 3.9 K/UL (ref 4.8–10.8)

## 2021-11-18 PROCEDURE — 80061 LIPID PANEL: CPT

## 2021-11-18 PROCEDURE — 82570 ASSAY OF URINE CREATININE: CPT

## 2021-11-18 PROCEDURE — 36415 COLL VENOUS BLD VENIPUNCTURE: CPT

## 2021-11-18 PROCEDURE — 83036 HEMOGLOBIN GLYCOSYLATED A1C: CPT

## 2021-11-18 PROCEDURE — 85025 COMPLETE CBC W/AUTO DIFF WBC: CPT

## 2021-11-18 PROCEDURE — 84443 ASSAY THYROID STIM HORMONE: CPT

## 2021-11-18 PROCEDURE — 80053 COMPREHEN METABOLIC PANEL: CPT

## 2021-11-18 PROCEDURE — 82043 UR ALBUMIN QUANTITATIVE: CPT

## 2021-11-18 PROCEDURE — 84439 ASSAY OF FREE THYROXINE: CPT

## 2021-11-24 LAB — HBA1C MFR BLD: 8.5 % (ref 4.8–5.9)

## 2021-11-26 ENCOUNTER — TELEPHONE (OUTPATIENT)
Dept: FAMILY MEDICINE CLINIC | Age: 71
End: 2021-11-26

## 2021-11-26 NOTE — TELEPHONE ENCOUNTER
[facility] DR OSKAR GUZMAN called about a Rx that was sent over today.     Pharmacy states that the Rx needs to be sent as freestyle thong sensors 14 day  Please advise

## 2021-11-27 RX ORDER — FLASH GLUCOSE SCANNING READER
EACH MISCELLANEOUS
Qty: 14 EACH | Refills: 0 | Status: SHIPPED | OUTPATIENT
Start: 2021-11-27 | End: 2021-11-27 | Stop reason: SDUPTHER

## 2021-11-27 RX ORDER — FLASH GLUCOSE SCANNING READER
EACH MISCELLANEOUS
Qty: 14 EACH | Refills: 5 | Status: SHIPPED | OUTPATIENT
Start: 2021-11-27

## 2021-12-01 DIAGNOSIS — G63 POLYNEUROPATHY ASSOCIATED WITH UNDERLYING DISEASE (HCC): ICD-10-CM

## 2021-12-01 DIAGNOSIS — E10.9 TYPE 1 DIABETES MELLITUS ON INSULIN THERAPY (HCC): ICD-10-CM

## 2021-12-02 ENCOUNTER — TELEPHONE (OUTPATIENT)
Dept: FAMILY MEDICINE CLINIC | Age: 71
End: 2021-12-02

## 2021-12-02 RX ORDER — FLASH GLUCOSE SENSOR
KIT MISCELLANEOUS
COMMUNITY
End: 2021-12-02 | Stop reason: SDUPTHER

## 2021-12-02 NOTE — TELEPHONE ENCOUNTER
Pharmacy requesting medication refill. Please approve or deny this request.    Rx requested:  Requested Prescriptions     Pending Prescriptions Disp Refills    DULoxetine (CYMBALTA) 60 MG extended release capsule [Pharmacy Med Name: DULoxetine HCl 60 MG Oral Capsule Delayed Release Particles] 90 capsule 0     Sig: Take 1 capsule by mouth daily         Last Office Visit:   6/3/2021      Next Visit Date:  No future appointments.

## 2021-12-02 NOTE — TELEPHONE ENCOUNTER
Pharmacy requesting medication refill. Please approve or deny this request.    Rx requested:  Requested Prescriptions     Pending Prescriptions Disp Refills    NOVOLIN R 100 UNIT/ML injection [Pharmacy Med Name: Genesis  R 100 UNIT/ML Injection Solution] 30 mL 0     Sig: INJECT SUBCUTANEOUSLY THREE TIMES DAILY UP  UNITS DAILY AS NEEDED    Continuous Blood Gluc Sensor (FREESTYLE NUBIA 14 DAY SENSOR) MISC       Sig: Use as directed         Last Office Visit:   2021      Next Visit Date:  No future appointments.

## 2021-12-02 NOTE — TELEPHONE ENCOUNTER
----- Message from Noris Arevalo sent at 11/27/2021 11:04 AM EST -----  Subject: Message to Provider    QUESTIONS  Information for Provider? [facility] called and stated that   the wrong RX keeps getting sent. They DO NOT need [facility] they need   [facility]. Please change the RX . If there is a issue call the   Pharmacy [phone]  ---------------------------------------------------------------------------  --------------  CALL BACK INFO  What is the best way for the office to contact you? Do not leave any   message, patient will call back for answer  Preferred Call Back Phone Number? [phone]  ---------------------------------------------------------------------------  --------------  SCRIPT ANSWERS  Relationship to Patient? Third Party  Representative Name?  [facility]

## 2021-12-03 RX ORDER — FLASH GLUCOSE SENSOR
1 KIT MISCELLANEOUS
Qty: 14 EACH | Refills: 2 | Status: SHIPPED | OUTPATIENT
Start: 2021-12-03 | End: 2022-01-28 | Stop reason: SDUPTHER

## 2021-12-03 RX ORDER — HUMAN INSULIN 100 [IU]/ML
INJECTION, SOLUTION SUBCUTANEOUS
Qty: 30 ML | Refills: 0 | Status: SHIPPED | OUTPATIENT
Start: 2021-12-03 | End: 2021-12-09 | Stop reason: SDUPTHER

## 2021-12-03 RX ORDER — DULOXETIN HYDROCHLORIDE 60 MG/1
60 CAPSULE, DELAYED RELEASE ORAL DAILY
Qty: 90 CAPSULE | Refills: 0 | Status: SHIPPED | OUTPATIENT
Start: 2021-12-03 | End: 2022-01-28 | Stop reason: SDUPTHER

## 2021-12-09 DIAGNOSIS — E10.9 TYPE 1 DIABETES MELLITUS ON INSULIN THERAPY (HCC): ICD-10-CM

## 2021-12-09 RX ORDER — HUMAN INSULIN 100 [IU]/ML
INJECTION, SOLUTION SUBCUTANEOUS
Qty: 30 ML | Refills: 2 | Status: SHIPPED | OUTPATIENT
Start: 2021-12-09 | End: 2022-04-29 | Stop reason: SDUPTHER

## 2021-12-09 NOTE — TELEPHONE ENCOUNTER
pharmacy requesting medication refill. Please approve or deny this request. The pharmacy keep requesting this medication even though it has been done already. Rx requested:  Requested Prescriptions     Pending Prescriptions Disp Refills    NOVOLIN R 100 UNIT/ML injection 30 mL 2     Sig: INJECT SUBCUTANEOUSLY THREE TIMES DAILY UP  UNITS DAILY AS NEEDED         Last Office Visit:   7/23/2021      Next Visit Date:  No future appointments.

## 2022-01-05 DIAGNOSIS — E10.9 TYPE 1 DIABETES MELLITUS ON INSULIN THERAPY (HCC): ICD-10-CM

## 2022-01-05 NOTE — TELEPHONE ENCOUNTER
Patient is requesting medication refill.  Please approve or deny this request.    Rx requested:  Requested Prescriptions     Pending Prescriptions Disp Refills    insulin aspart (NOVOLOG) 100 UNIT/ML injection vial 30 mL 3     Sig: INJECT 25 UNITS INTO THE SKIN THREE TIMES DAILY (BEFORE MEALS, SLIDING SCALE IN THE MORNING AND AT BEDTIME AS DIRECTED) diagnosis code: E10.9         Last Office Visit:   7/23/2021      Next Visit Date:  Future Appointments   Date Time Provider Amado Aceves   1/28/2022 10:30 AM KANE Casillas - CNP [address]

## 2022-01-28 ENCOUNTER — HOSPITAL ENCOUNTER (OUTPATIENT)
Age: 72
Setting detail: SPECIMEN
Discharge: HOME | End: 2022-01-28
Payer: MEDICARE

## 2022-01-28 ENCOUNTER — OFFICE VISIT (OUTPATIENT)
Dept: FAMILY MEDICINE CLINIC | Age: 72
End: 2022-01-28
Payer: MEDICARE

## 2022-01-28 VITALS
SYSTOLIC BLOOD PRESSURE: 110 MMHG | DIASTOLIC BLOOD PRESSURE: 60 MMHG | RESPIRATION RATE: 14 BRPM | TEMPERATURE: 97 F | WEIGHT: 243 LBS | HEIGHT: 71 IN | OXYGEN SATURATION: 97 % | BODY MASS INDEX: 34.02 KG/M2 | HEART RATE: 72 BPM

## 2022-01-28 DIAGNOSIS — I48.0 PAROXYSMAL ATRIAL FIBRILLATION (HCC): ICD-10-CM

## 2022-01-28 DIAGNOSIS — I10 ESSENTIAL HYPERTENSION: ICD-10-CM

## 2022-01-28 DIAGNOSIS — N40.1 BPH ASSOCIATED WITH NOCTURIA: ICD-10-CM

## 2022-01-28 DIAGNOSIS — D61.818 PANCYTOPENIA (HCC): ICD-10-CM

## 2022-01-28 DIAGNOSIS — Z79.899 HIGH RISK MEDICATION USE: ICD-10-CM

## 2022-01-28 DIAGNOSIS — E10.42 POLYNEUROPATHY DUE TO TYPE 1 DIABETES MELLITUS (HCC): ICD-10-CM

## 2022-01-28 DIAGNOSIS — R35.1 BPH ASSOCIATED WITH NOCTURIA: ICD-10-CM

## 2022-01-28 DIAGNOSIS — I25.10 CORONARY ARTERY DISEASE INVOLVING NATIVE CORONARY ARTERY OF NATIVE HEART WITHOUT ANGINA PECTORIS: ICD-10-CM

## 2022-01-28 DIAGNOSIS — E78.2 MIXED HYPERLIPIDEMIA: ICD-10-CM

## 2022-01-28 DIAGNOSIS — K59.00 CONSTIPATION, UNSPECIFIED CONSTIPATION TYPE: ICD-10-CM

## 2022-01-28 DIAGNOSIS — E10.9 TYPE 1 DIABETES MELLITUS ON INSULIN THERAPY (HCC): Primary | ICD-10-CM

## 2022-01-28 DIAGNOSIS — G82.20 CHRONIC PROGRESSIVE PARAPARESIS (HCC): ICD-10-CM

## 2022-01-28 LAB — HBA1C MFR BLD: 7.3 %

## 2022-01-28 PROCEDURE — 83036 HEMOGLOBIN GLYCOSYLATED A1C: CPT | Performed by: NURSE PRACTITIONER

## 2022-01-28 PROCEDURE — 99214 OFFICE O/P EST MOD 30 MIN: CPT | Performed by: NURSE PRACTITIONER

## 2022-01-28 PROCEDURE — 3051F HG A1C>EQUAL 7.0%<8.0%: CPT | Performed by: NURSE PRACTITIONER

## 2022-01-28 PROCEDURE — 80307 DRUG TEST PRSMV CHEM ANLYZR: CPT

## 2022-01-28 RX ORDER — DULOXETIN HYDROCHLORIDE 60 MG/1
60 CAPSULE, DELAYED RELEASE ORAL DAILY
Qty: 90 CAPSULE | Refills: 3 | Status: SHIPPED | OUTPATIENT
Start: 2022-01-28

## 2022-01-28 RX ORDER — FLASH GLUCOSE SENSOR
1 KIT MISCELLANEOUS
Qty: 14 EACH | Refills: 2 | Status: SHIPPED | OUTPATIENT
Start: 2022-01-28

## 2022-01-28 RX ORDER — PREGABALIN 150 MG/1
150 CAPSULE ORAL 3 TIMES DAILY
Qty: 270 CAPSULE | Refills: 0 | Status: SHIPPED | OUTPATIENT
Start: 2022-01-28 | End: 2022-04-29 | Stop reason: SDUPTHER

## 2022-01-28 RX ORDER — BLOOD SUGAR DIAGNOSTIC
STRIP MISCELLANEOUS
Qty: 200 EACH | Refills: 11 | Status: SHIPPED | OUTPATIENT
Start: 2022-01-28 | End: 2022-04-29 | Stop reason: SDUPTHER

## 2022-01-28 NOTE — PROGRESS NOTES
of stool softener and mild laxative to help keep things moving. He does drink some water but not a lot of water every day. No abdominal pain symptoms reported. Pancytopenia: He states that about 10 years ago he established with hematology who told him that his levels were normal for him. No issues with bone marrow. The five diabetic measures for control:   Hemoglobin A1C (%)   Date Value   01/28/2022 7.3     LDL Calculated (mg/dL)   Date Value   11/18/2021 46         Blood pressure less than 131/81,   BP Readings from Last 1 Encounters:   01/28/22 110/60     Smoking, non smoker is goal. This pt is not a smoker. This pt does an aspirin a day. Last eye exam was 2021  Last diabetic foot exam was 2022  Last urine microalbumin creatinine ratio was 2021    PMH: This 66 y.o. male  patient is  hypertensive, is  diabetic, is  hyperlipidemic. He has no history of smoking and has a  family history of heart disease. He is  obese. Review of Systems  Patient denies chest pain, shortness of breath, headache, lightheadedness, blurred vision, peripheral edema and palpitations. Eyes: no rapid change in vision  Ears, nose, mouth, throat, and face: no changes in hearing or sore throat  Respiratory: No shortness of breath or cough. Cardiovascular: no chest pain or pressure. This patient reports no polyuria, polydipsia or episodes of hypoglycemia. Treatment Adherence:   Medication compliance:  compliant most of the time  Diet compliance:  compliant most of the time  Weight trend: stable  Current exercise: walks 1 time(s) per day  What might prevent you from meeting your goal?: none  Patient plan for overcoming barriers: N/A   Patient Confidence: 8/10      Objective:   EXAM:  Constitutional Blood pressure 110/60, pulse 72, temperature 97 °F (36.1 °C), temperature source Temporal, resp. rate 14, height 5' 11\" (1.803 m), weight 243 lb (110.2 kg), SpO2 97 %. .  He has a normal affect, no acute distress, appears well developed and well nourished. Neck:  neck- supple, no mass, non-tender and no bruits  Lungs:  Normal expansion. Clear to auscultation. No rales, rhonchi, or wheezing., No chest wall tenderness. Heart:  Heart sounds are normal.  Regular rate and rhythm without murmur, gallop or rub. Abdomen:  Soft, non-tender, normal bowel sounds. No bruits, organomegaly or masses. Extremities: Extremities warm to touch, pink, with no edema. Dorsalis pedis and posterior tibial pulses are symmetric. No fissures between the toes. No open sores on the feet. Sensation intact to monofilament testing in 6 of 8 areas tested. No edema in feet. Radial pulses strong and symmetric. No edema in hands or wrists. Assessment:      Diagnosis Orders   1. Type 1 diabetes mellitus on insulin therapy (HCC)  blood glucose test strips (ONETOUCH VERIO) strip    insulin aspart (NOVOLOG) 100 UNIT/ML injection vial    insulin NPH (NOVOLIN N) 100 UNIT/ML injection vial    Continuous Blood Gluc Sensor (PreisbockSTYLE NUBIA 14 DAY SENSOR) [facility] DIABETES FOOT EXAM    CBC Auto Differential    Comprehensive Metabolic Panel    Lipid Panel    Hemoglobin A1C    Microalbumin / Creatinine Urine Ratio    POCT glycosylated hemoglobin (Hb A1C)   2. Polyneuropathy due to type 1 diabetes mellitus (HCC)  pregabalin (LYRICA) 150 MG capsule   3. Essential hypertension     4. Mixed hyperlipidemia     5. Chronic progressive paraparesis ([address]- secondary to spinal stenosis/surgal repaired     6. Pancytopenia (HCC)     7. Paroxysmal atrial fibrillation ([address]     8. Coronary artery disease involving native coronary artery of native heart without angina pectoris     9. BPH associated with nocturia  PSA, Diagnostic   10. Constipation, unspecified constipation type     11. High risk medication use  Pain Management Drug Screen       PLAN: Include orders in the DX section.   Diabetes Counseling   Patient was counseled regarding disease risks and adopting healthy behaviors. Patient was provided education materials to assist with self management. Patient was provided log (or received log during previous visit) to record blood pressure, food intake and/or blood sugar. Patient was instructed to keep log up-to-date and to always bring log to all office visits. Follow up: 6 months and as needed. Blood work one week prior as ordered. Follow-up in 3 months for virtual visit medication refill. 1.  2.   11.   Diabetes is overall well controlled on current insulin regimen. Continue the same and notify me if more hypoglycemic events occur. Refill of Lyrica given for chronic nerve pain. No medication side effects reported and medication has been effective in reducing severity of symptoms. 3.  Blood pressure is well controlled on current medication. Continue the same. 4.  Lipid panel is stable on statin. No side effects reported. Continue the same. 5.  He continues to have generalized weakness of the lower extremities after having had a history of spinal stenosis with surgical intervention. 6.  He has seen hematology in the past.  Levels have been stable. 7.  8.  He continues to follow routinely with cardiology and electrophysiology. Taking low-dose aspirin but no recent evidence of atrial fibrillation over the past 6 months with loop recorder. 9.  We will get diagnostic PSA with next lab. No new onset urinary symptoms reported. 10.  Continue current bowel regimen and recommend increasing water intake. Notify me if symptoms worsen. Controlled Substance Monitoring:    Acute and Chronic Pain Monitoring:   RX Monitoring 1/28/2022   Attestation -   Periodic Controlled Substance Monitoring Possible medication side effects, risk of tolerance/dependence & alternative treatments discussed. ;No signs of potential drug abuse or diversion identified. ;Assessed functional status.    Chronic Pain > 50 MEDD Obtained or confirmed \"Consent for Opioid Use\" on

## 2022-02-04 LAB
6-ACETYLMORPHINE: NOT DETECTED
7-AMINOCLONAZEPAM: NOT DETECTED
ALPHA-OH-ALPRAZOLAM: NOT DETECTED
ALPHA-OH-MIDAZOLAM, URINE: NOT DETECTED
ALPRAZOLAM: NOT DETECTED
AMPHETAMINE: NOT DETECTED
BARBITURATES: NOT DETECTED
BENZOYLECGONINE: NOT DETECTED
BUPRENORPHINE: NOT DETECTED
CARISOPRODOL: NOT DETECTED
CLONAZEPAM: NOT DETECTED
CODEINE: NOT DETECTED
CREATININE URINE: 87.4 MG/DL (ref 20–400)
DIAZEPAM: NOT DETECTED
EER PAIN MGT DRUG PANEL, HIGH RES/EMIT U: NORMAL
ETHYL GLUCURONIDE: PRESENT
FENTANYL: NOT DETECTED
GABAPENTIN: NOT DETECTED
HYDROCODONE: NOT DETECTED
HYDROMORPHONE: NOT DETECTED
LORAZEPAM: NOT DETECTED
MARIJUANA METABOLITE: NOT DETECTED
MDA: NOT DETECTED
MDEA: NOT DETECTED
MDMA URINE: NOT DETECTED
MEPERIDINE: NOT DETECTED
METHADONE: NOT DETECTED
METHAMPHETAMINE: NOT DETECTED
METHYLPHENIDATE: NOT DETECTED
MIDAZOLAM: NOT DETECTED
MORPHINE: NOT DETECTED
NALOXONE: NOT DETECTED
NORBUPRENORPHINE, FREE: NOT DETECTED
NORDIAZEPAM: NOT DETECTED
NORFENTANYL: NOT DETECTED
NORHYDROCODONE, URINE: NOT DETECTED
NOROXYCODONE: NOT DETECTED
NOROXYMORPHONE, URINE: NOT DETECTED
OXAZEPAM: NOT DETECTED
OXYCODONE: NOT DETECTED
OXYMORPHONE: NOT DETECTED
PAIN MANAGEMENT DRUG PANEL: NORMAL
PCP: NOT DETECTED
PHENTERMINE: NOT DETECTED
PREGABALIN: PRESENT
TAPENTADOL, URINE: NOT DETECTED
TAPENTADOL-O-SULFATE, URINE: NOT DETECTED
TEMAZEPAM: NOT DETECTED
TRAMADOL: NOT DETECTED
ZOLPIDEM: NOT DETECTED

## 2022-02-27 DIAGNOSIS — E10.9 TYPE 1 DIABETES MELLITUS ON INSULIN THERAPY (HCC): ICD-10-CM

## 2022-02-28 NOTE — TELEPHONE ENCOUNTER
Comments:     Last Office Visit (last PCP visit):   1/28/2022    Next Visit Date:  Future Appointments   Date Time Provider Amado Aceves   4/29/2022  9:10 AM Odette Grimm, KANE - CNP [address]   7/29/2022  8:30 AM Odette Grimm, [address] [facility]       **If hasn't been seen in over a year OR hasn't followed up according to last diabetes/ADHD visit, make appointment for patient before sending refill to provider.     Rx requested:  Requested Prescriptions     Pending Prescriptions Disp Refills    NOVOLOG 100 UNIT/ML injection vial [Pharmacy Med Name: NovoLOG 100 UNIT/ML Subcutaneous Solution] 30 mL 0     Sig: INJECT 25 UNITS SUBCUTANEOUSLY THREE TIMES DAILY BEFORE MEAL(S), SLIDING SCALE  IN THE MORNING AND AT BEDTIME

## 2022-03-02 ENCOUNTER — TELEMEDICINE (OUTPATIENT)
Dept: FAMILY MEDICINE CLINIC | Age: 72
End: 2022-03-02
Payer: MEDICARE

## 2022-03-02 DIAGNOSIS — Z00.00 MEDICARE ANNUAL WELLNESS VISIT, SUBSEQUENT: Primary | ICD-10-CM

## 2022-03-02 PROCEDURE — G0439 PPPS, SUBSEQ VISIT: HCPCS

## 2022-03-02 ASSESSMENT — PATIENT HEALTH QUESTIONNAIRE - PHQ9
SUM OF ALL RESPONSES TO PHQ QUESTIONS 1-9: 0
2. FEELING DOWN, DEPRESSED OR HOPELESS: 0
SUM OF ALL RESPONSES TO PHQ9 QUESTIONS 1 & 2: 0
1. LITTLE INTEREST OR PLEASURE IN DOING THINGS: 0
SUM OF ALL RESPONSES TO PHQ QUESTIONS 1-9: 0

## 2022-03-02 ASSESSMENT — LIFESTYLE VARIABLES
HOW MANY STANDARD DRINKS CONTAINING ALCOHOL DO YOU HAVE ON A TYPICAL DAY: 1 OR 2
HOW OFTEN DURING THE LAST YEAR HAVE YOU FAILED TO DO WHAT WAS NORMALLY EXPECTED FROM YOU BECAUSE OF DRINKING: 0
HOW OFTEN DURING THE LAST YEAR HAVE YOU HAD A FEELING OF GUILT OR REMORSE AFTER DRINKING: 0
HOW OFTEN DURING THE LAST YEAR HAVE YOU NEEDED AN ALCOHOLIC DRINK FIRST THING IN THE MORNING TO GET YOURSELF GOING AFTER A NIGHT OF HEAVY DRINKING: 0
HOW OFTEN DURING THE LAST YEAR HAVE YOU BEEN UNABLE TO REMEMBER WHAT HAPPENED THE NIGHT BEFORE BECAUSE YOU HAD BEEN DRINKING: 0
HAS A RELATIVE, FRIEND, DOCTOR, OR ANOTHER HEALTH PROFESSIONAL EXPRESSED CONCERN ABOUT YOUR DRINKING OR SUGGESTED YOU CUT DOWN: 0
HAVE YOU OR SOMEONE ELSE BEEN INJURED AS A RESULT OF YOUR DRINKING: 0
HOW OFTEN DO YOU HAVE A DRINK CONTAINING ALCOHOL: 4 OR MORE TIMES A WEEK
HOW OFTEN DURING THE LAST YEAR HAVE YOU FOUND THAT YOU WERE NOT ABLE TO STOP DRINKING ONCE YOU HAD STARTED: 0

## 2022-03-02 NOTE — PATIENT INSTRUCTIONS
Personalized Preventive Plan for Nell Denise - 3/2/2022  Medicare offers a range of preventive health benefits. Some of the tests and screenings are paid in full while other may be subject to a deductible, co-insurance, and/or copay. Some of these benefits include a comprehensive review of your medical history including lifestyle, illnesses that may run in your family, and various assessments and screenings as appropriate. After reviewing your medical record and screening and assessments performed today your provider may have ordered immunizations, labs, imaging, and/or referrals for you. A list of these orders (if applicable) as well as your Preventive Care list are included within your After Visit Summary for your review. Other Preventive Recommendations:    · A preventive eye exam performed by an eye specialist is recommended every 1-2 years to screen for glaucoma; cataracts, macular degeneration, and other eye disorders. · A preventive dental visit is recommended every 6 months. · Try to get at least 150 minutes of exercise per week or 10,000 steps per day on a pedometer . · Order or download the FREE \"Exercise & Physical Activity: Your Everyday Guide\" from [employer]. Call 4-753.353.2947 or search [employer] online. · You need 1055-7423 mg of calcium and 3691-2324 IU of vitamin D per day. It is possible to meet your calcium requirement with diet alone, but a vitamin D supplement is usually necessary to meet this goal.  · When exposed to the sun, use a sunscreen that protects against both UVA and UVB radiation with an SPF of 30 or greater. Reapply every 2 to 3 hours or after sweating, drying off with a towel, or swimming. · Always wear a seat belt when traveling in a car. Always wear a helmet when riding a bicycle or motorcycle.

## 2022-03-02 NOTE — PROGRESS NOTES
Medicare Annual Wellness Visit    Brittany Rahman is here for Medicare [facility] was seen today for medicare awv. Diagnoses and all orders for this visit:    Medicare annual wellness visit, subsequent         Recommendations for Preventive Services Due: see orders and patient instructions/AVS.  Recommended screening schedule for the next 5-10 years is provided to the patient in written form: see Patient Instructions/AVS.     Return for Medicare Annual Wellness Visit in 1 year. Subjective   The following acute and/or chronic problems were also addressed today:  NA    Patient's complete Health Risk Assessment and screening values have been reviewed and are found in Flowsheets. The following problems were reviewed today and where indicated follow up appointments were made and/or referrals ordered. Positive Risk Factor Screenings with Interventions:    Fall Risk:  Do you feel unsteady or are you worried about falling? : no  2 or more falls in past year?: (!) yes  Fall with injury in past year?: no     Fall Risk Interventions:    · Home safety tips provided       Alcohol Screening:  AUDIT Total Score: 4    A score of 8 or more is associated with harmful or hazardous drinking. A score of 13 or more in women, and 15 or more in men, is likely to indicate alcohol dependence.     Substance Abuse - Alcohol Interventions:  patient agrees to limit alcohol intake to a moderate level- no more than 14 drinks/week and 4 drinks per occasion for men, or no more than 7 drinks/week and 3 drinks/occasion for women          General Health and ACP:  General  In general, how would you say your health is?: Very Good  In the past 7 days, have you experienced any of the following: New or Increased Pain, New or Increased Fatigue, Loneliness, Social Isolation, Stress or Anger?: No  Do you get the social and emotional support that you need?: (!) No  Do you have a Living Will?: (!) No    Advance Directives     Power of  Living Will ACP-Advance Directive ACP-Power of     Not on File Filed on 03/30/20 Filed Not on File      General Health Risk Interventions:  · No Living Will: Offer to male patient advanced care planning packet he reports he has 1 advised him to fill it out and bring it to next PCP appointment. Health Habits/Nutrition:     Physical Activity: Sufficiently Active    Days of Exercise per Week: 7 days    Minutes of Exercise per Session: 30 min     Have you lost any weight without trying in the past 3 months?: No     Have you seen the dentist within the past year?: (!) No    Health Habits/Nutrition Interventions:  · Dental exam overdue:  patient encouraged to make appointment with his/her dentist             Objective      Patient-Reported Vitals  No data recorded            Allergies   Allergen Reactions    Atorvastatin Other (See Comments)     Muscle ache    Shellfish Allergy Itching, Rash and Swelling    Simvastatin Other (See Comments)     cough    Seasonal      Dandelions / maple blossoms cause sinus sx    Pcn [Penicillins] Hives and Rash     Caused eczema as child     Prior to Visit Medications    Medication Sig Taking?  Authorizing Provider   NOVOLOG 100 UNIT/ML injection vial INJECT 25 UNITS SUBCUTANEOUSLY THREE TIMES DAILY BEFORE MEAL(S), SLIDING SCALE  IN THE MORNING AND AT BEDTIME  KANE Samuel CNP   blood glucose test strips (ONETOUCH VERIO) strip USE 1 STRIP TO CHECK GLUCOSE FIVE TIMES DAILY AS NEEDED  KANE Recinos CNP   DULoxetine (CYMBALTA) 60 MG extended release capsule Take 1 capsule by mouth daily  KANE Recinos CNP   insulin NPH (NOVOLIN N) 100 UNIT/ML injection vial 30 units before breakfast, 40 units with supper, 15 units at bed time  KANE Recinos CNP   Continuous Blood Gluc Sensor (FREESTYLE NUBIA 14 DAY SENSOR) [facility] In[facility] 1 Device as directed every 14 days Use as directedUse as directed  KANE Arredondo CNP   mirabegron (MYRBETRIQ) 50 MG TB24 Take 50 mg by mouth daily  Historical Provider, MD   pregabalin (LYRICA) 150 MG capsule Take 1 capsule by mouth 3 times daily for 90 days. KANE Nicholson CNP   NOVOLIN R 100 UNIT/ML injection INJECT SUBCUTANEOUSLY THREE TIMES DAILY UP  UNITS DAILY AS NEEDED  KANE Chamorro CNP   Continuous Blood Gluc  (FREESTYLE NUBIA 14 DAY READER) XU Please give 1 reader  KANE Chamorro CNP   blood glucose test strips (ONETOUCH VERIO) strip USE 1 STRIP TO CHECK GLUCOSE FIVE TIMES DAILY AS NEEDED  KANE Chamorro CNP   celecoxib (CELEBREX) 200 MG capsule Take 1 capsule by mouth daily  KANE Chamorro CNP   irbesartan (AVAPRO) 300 MG tablet Take 1 tablet by mouth once daily  KANE Chamorro CNP   metFORMIN (GLUCOPHAGE) 1000 MG tablet Take 1 tablet by mouth 2 times daily (with meals)  KANE Chamorro CNP   hydroCHLOROthiazide (HYDRODIURIL) 12.5 MG tablet TAKE 1 TABLET BY MOUTH ONCE DAILY  Historical Provider, MD   tadalafil (CIALIS) 5 MG tablet Take 1 tablet by mouth daily  [place], MD   clindamycin (CLEOCIN) 300 MG capsule TAKE TWO CAPSULES BY MOUTH ONE HOUR BEFORE APPOINTMENT  Historical Provider, MD   acetaminophen (TYLENOL) 500 MG tablet Take 1,000 mg by mouth every 6 hours as needed for Pain  Historical Provider, MD   Beclomethasone Diprop Monohyd (BECONASE AQ NA) by Nasal route 2 times daily   Historical Provider, MD   polyethylene glycol (GLYCOLAX) powder Take 17 g by mouth daily  Historical Provider, MD   rosuvastatin (CRESTOR) 20 MG tablet Take 20 mg by mouth every evening   Historical Provider, MD   ezetimibe (ZETIA) 10 MG tablet Take 10 mg by mouth daily Indications: takes 1-2 times per week   Historical Provider, MD   Lancets MISC Test five times daily.   Darell Solo MD   aspirin 81 MG tablet Take 81 mg by mouth daily  Historical Provider, MD   Insulin Syringes, Disposable, U-100 1 ML MISC Use qid for dosing insulin as directed. Dx 250.00  MD Fouzia Kothari Timur INSULIN SYRINGE 30G X 1/2\" 1 ML MISC USE 4 TIMES DAILY FOR DOSING INSULIN AS DIRECTED  Dejon Brown MD   Docusate Sodium (COLACE PO) Take 400 mg by mouth every evening   Historical Provider, MD   metoprolol (TOPROL XL) 50 MG XL tablet Take 50 mg by mouth nightly   Historical Provider, MD Sinha (Including outside providers/suppliers regularly involved in providing care):   Patient Care Team:  KANE Asher CNP as PCP - General (Certified Nurse Practitioner)  KANE Asher CNP as PCP - [address] [place] [address] [facility]  Bull Garcia MD (Gastroenterology)  Feli Chambers DO (Optometry)  Jett Haynes MD as Consulting Physician (Cardiology)    Reviewed and updated this visit:  Claudio Fan, was evaluated through a synchronous (real-time) audio-video encounter. The patient (or guardian if applicable) is aware that this is a billable service, which includes applicable co-pays. This Virtual Visit was conducted with patient's (and/or legal guardian's) consent. The visit was conducted pursuant to the emergency declaration under the [address] [place] [address] authority and the [employer] General Act. Patient identification was verified, and a caregiver was present when appropriate. The patient was located at home in a state where the provider was licensed to provide care.

## 2022-03-15 ENCOUNTER — PATIENT MESSAGE (OUTPATIENT)
Dept: FAMILY MEDICINE CLINIC | Age: 72
End: 2022-03-15

## 2022-03-15 ENCOUNTER — OFFICE VISIT (OUTPATIENT)
Dept: FAMILY MEDICINE CLINIC | Age: 72
End: 2022-03-15
Payer: MEDICARE

## 2022-03-15 VITALS
TEMPERATURE: 97.5 F | WEIGHT: 243 LBS | SYSTOLIC BLOOD PRESSURE: 120 MMHG | BODY MASS INDEX: 34.02 KG/M2 | HEART RATE: 82 BPM | DIASTOLIC BLOOD PRESSURE: 60 MMHG | OXYGEN SATURATION: 96 % | HEIGHT: 71 IN

## 2022-03-15 DIAGNOSIS — L03.115 CELLULITIS OF RIGHT LOWER EXTREMITY: Primary | ICD-10-CM

## 2022-03-15 DIAGNOSIS — L03.90 CELLULITIS, UNSPECIFIED CELLULITIS SITE: Primary | ICD-10-CM

## 2022-03-15 PROCEDURE — 96372 THER/PROPH/DIAG INJ SC/IM: CPT

## 2022-03-15 PROCEDURE — 99213 OFFICE O/P EST LOW 20 MIN: CPT

## 2022-03-15 RX ORDER — CEPHALEXIN 500 MG/1
500 CAPSULE ORAL 4 TIMES DAILY
Qty: 40 CAPSULE | Refills: 0 | Status: SHIPPED | OUTPATIENT
Start: 2022-03-15 | End: 2022-03-25

## 2022-03-15 RX ORDER — CEFTRIAXONE SODIUM 250 MG/1
1000 INJECTION, POWDER, FOR SOLUTION INTRAMUSCULAR; INTRAVENOUS ONCE
Status: COMPLETED | OUTPATIENT
Start: 2022-03-15 | End: 2022-03-15

## 2022-03-15 RX ADMIN — CEFTRIAXONE SODIUM 1000 MG: 250 INJECTION, POWDER, FOR SOLUTION INTRAMUSCULAR; INTRAVENOUS at 19:04

## 2022-03-15 NOTE — PATIENT INSTRUCTIONS
Patient Education        Cellulitis: Care Instructions  Your Care Instructions     Cellulitis is a skin infection caused by bacteria, most often strep or staph. It often occurs after a break in the skin from a scrape, cut, bite, or puncture, or after a rash. Cellulitis may be treated without doing tests to find out what caused it. But your doctor may do tests, if needed, to look for a specific bacteria, like methicillin-resistant Staphylococcus aureus (MRSA). The doctor has checked you carefully, but problems can develop later. If you notice any problems or new symptoms, get medical treatment right away. Follow-up care is a key part of your treatment and safety. Be sure to make and go to all appointments, and call your doctor if you are having problems. It's also a good idea to know your test results and keep a list of the medicines you take. How can you care for yourself at home? · Take your antibiotics as directed. Do not stop taking them just because you feel better. You need to take the full course of antibiotics. · Prop up the infected area on pillows to reduce pain and swelling. Try to keep the area above the level of your heart as often as you can. · If your doctor told you how to care for your wound, follow your doctor's instructions. If you did not get instructions, follow this general advice:  ? Wash the wound with clean water 2 times a day. Don't use hydrogen peroxide or alcohol, which can slow healing. ? You may cover the wound with a thin layer of petroleum jelly, such as Vaseline, and a nonstick bandage. ? Apply more petroleum jelly and replace the bandage as needed. · Be safe with medicines. Take pain medicines exactly as directed. ? If the doctor gave you a prescription medicine for pain, take it as prescribed. ? If you are not taking a prescription pain medicine, ask your doctor if you can take an over-the-counter medicine.   To prevent cellulitis in the future  · Try to prevent cuts, scrapes, or other injuries to your skin. Cellulitis most often occurs where there is a break in the skin. · If you get a scrape, cut, mild burn, or bite, wash the wound with clean water as soon as you can to help avoid infection. Don't use hydrogen peroxide or alcohol, which can slow healing. · If you have swelling in your legs (edema), support stockings and good skin care may help prevent leg sores and cellulitis. · Take care of your feet, especially if you have diabetes or other conditions that increase the risk of infection. Wear shoes and socks. Do not go barefoot. If you have athlete's foot or other skin problems on your feet, talk to your doctor about how to treat them. When should you call for help? Call your doctor now or seek immediate medical care if:    · You have signs that your infection is getting worse, such as:  ? Increased pain, swelling, warmth, or redness. ? Red streaks leading from the area. ? Pus draining from the area. ? A fever.     · You get a rash. Watch closely for changes in your health, and be sure to contact your doctor if:    · You do not get better as expected. Where can you learn more? Go to https://P[employer] Please[employer]. org and sign in to your [facility] [employer] account. Enter R941 in the [facility] box to learn more about \"Cellulitis: Care Instructions. \"     If you do not have an account, please click on the \"Sign Up Now\" link. Current as of: March 3, 2021               Content Version: 13.1  © 2006-2021 [employer]. Care instructions adapted under license by [facility] ([facility]). If you have questions about a medical condition or this instruction, always ask your healthcare professional. [place] [address] any warranty or liability for your use of this information. Remain off your feet as much as possible and elevate your right leg for the next couple of days.   If redness to the right lower leg spreads any further or if symptoms do not improve in a couple days want you to go into the ER for evaluation. If you experience symptoms such as confusion dizziness lightheadedness this could be a sign the infection has spread and you should also go to the ER for evaluation.

## 2022-03-16 RX ORDER — CLINDAMYCIN HYDROCHLORIDE 300 MG/1
300 CAPSULE ORAL 3 TIMES DAILY
Qty: 30 CAPSULE | Refills: 0 | Status: SHIPPED | OUTPATIENT
Start: 2022-03-16 | End: 2022-03-26

## 2022-03-16 ASSESSMENT — ENCOUNTER SYMPTOMS
SHORTNESS OF BREATH: 0
COLOR CHANGE: 1
COUGH: 0
WHEEZING: 0
CHEST TIGHTNESS: 0

## 2022-03-16 NOTE — PROGRESS NOTES
[facility] Encounter        ASSESSMENT/PLAN     Isabela Haider is a 67 y.o. male who presents with:  Reporting redness and swelling to right shin starting 2 days ago, mild pain. History of diabetes, lower extremity edema, and cellulitis of the right lower extremity. Pt is afebrile has nontoxic appearance and VS are stable. On examination there is erythremia to the anterior right lower extremity approximately 4 cm in diameter distal to the right knee. There is edema to bilateral lower extremities worse on the right side. Patient reports the right leg is usually larger there is no increase in swelling per the patient. No appearance of wound to the leg however there is a fissure on the right heel 1 cm length. Patient reports this has been present for a while. Pedal pulses palpable right foot. Patient denies pain behind the knee or in the back of the leg. I have a low suspicion for DVT. 1. Cellulitis of right lower extremity      Medicated with 1 g IM Rocephin in the office. Order for Keflex oral placed. Advised patient if redness spreads or swelling increases he should return for evaluation in the ER. Recommend he make an appointment with his PCP for follow-up within the next week. PATIENT REFERRED TO:  Return in about 1 week (around 3/22/2022) for Follow up with PCP. DISCHARGE MEDICATIONS:  New Prescriptions    CEPHALEXIN (KEFLEX) 500 MG CAPSULE    Take 1 capsule by mouth 4 times daily for 10 days    CLINDAMYCIN (CLEOCIN) 300 MG CAPSULE    Take 1 capsule by mouth 3 times daily for 10 days     Cannot display discharge medications since this is not an admission.        KANE Partida - CNP    CHIEF COMPLAINT       Chief Complaint   Patient presents with    Leg Pain     Patient states that he has been having right leg pain for about 2 days          SUBJECTIVE/REVIEW OF SYSTEMS     Review of Systems   Constitutional: Negative for chills, fatigue and fever.   Respiratory: Negative for cough, chest tightness, shortness of breath and wheezing. Cardiovascular: Negative for chest pain and palpitations. Endocrine: Negative for cold intolerance, heat intolerance, polydipsia, polyphagia and polyuria. Musculoskeletal: Positive for gait problem. Negative for joint swelling. Skin: Positive for color change. Negative for pallor, rash and wound. Neurological: Negative for dizziness, light-headedness, numbness and headaches. Hematological: Negative for adenopathy. Does not bruise/bleed easily. Psychiatric/Behavioral: Negative for agitation, confusion, dysphoric mood, hallucinations and self-injury. The patient is not nervous/anxious. OBJECTIVE/PHYSICAL EXAM     Physical Exam  Constitutional:       General: He is not in acute distress. Appearance: Normal appearance. He is not ill-appearing or diaphoretic. HENT:      Head: Normocephalic. Mouth/Throat:      Mouth: Mucous membranes are moist.   Eyes:      General:         Right eye: No discharge. Left eye: No discharge. Conjunctiva/sclera: Conjunctivae normal.   Cardiovascular:      Rate and Rhythm: Normal rate. Pulses:           Dorsalis pedis pulses are 1+ on the right side. Pulmonary:      Effort: Pulmonary effort is normal. No respiratory distress. Musculoskeletal:         General: No swelling. Normal range of motion. Right lower le+ Edema present. Left lower le+ Edema present. Skin:     General: Skin is warm. Capillary Refill: Capillary refill takes less than 2 seconds. Coloration: Skin is not jaundiced or pale. Findings: Erythema and lesion present. No bruising or rash. Comments: Erythremia, warmth , no lesion or drainage. Neurological:      Mental Status: He is alert and oriented to person, place, and time. Mental status is at baseline.       Gait: Gait abnormal.   Psychiatric:         Mood and Affect: Mood normal. Behavior: Behavior normal.         VITALS  BP: 120/60, Temp: 97.5 °F (36.4 °C), Temp Source: Temporal, Pulse: 82,  , SpO2: 96 %      PAST MEDICAL HISTORY         Diagnosis Date    Arthritis     Bell's palsy 2002    CAD (coronary artery disease)     no stents    DM w/ coma type I, uncontrolled ([address] 10/25/2019    ED (erectile dysfunction)     GERD (gastroesophageal reflux disease)     Herpes zoster 2000    Hyperactivity of bladder 1/10/2019    Hyperlipidemia     meds > 20 yrs    Hyperopia of both eyes with astigmatism and presbyopia 1/6/2017    Hypertension     meds > 20 yrs    Microalbuminuria due to type 1 diabetes mellitus ([address] 12/5/2019    Nuclear sclerotic cataract of right eye 1/6/2017    Obesity     Pancytopenia ([address] 2008    Paroxysmal atrial fibrillation ([address] 9/5/2019    Polyneuropathy due to type 1 diabetes mellitus ([address] 7/7/2020    Type 2 diabetes mellitus with diabetic polyneuropathy, with long-term current use of insulin ([address] 11/14/2017    hx since 1988 / Insulin dependent [address]     Patient  has a past surgical history that includes lipoma resection (1987); Knee arthroscopy (Right, 12/2009); Colonoscopy (2004); Cataract removal (Left, 12/18/2013); Colonoscopy (01/28/2016); Total knee arthroplasty (Left, 2013); cyst removal (Bilateral, 1987); Cardiac catheterization (2007); Tonsillectomy (1954); joint replacement (2013); Lumbar spine surgery (Bilateral, 03/27/2020); laminectomy (N/A, 3/27/2020); and Cataract removal (Right, 12/17/2021).   CURRENT MEDICATIONS       Previous Medications    ACETAMINOPHEN (TYLENOL) 500 MG TABLET    Take 1,000 mg by mouth every 6 hours as needed for Pain    ASPIRIN 81 MG TABLET    Take 81 mg by mouth daily    BECLOMETHASONE DIPROP MONOHYD (BECONASE AQ NA)    by Nasal route 2 times daily     BLOOD GLUCOSE TEST STRIPS (ONETOUCH VERIO) STRIP    USE 1 STRIP TO CHECK GLUCOSE FIVE TIMES DAILY AS NEEDED    BLOOD GLUCOSE TEST STRIPS (ONETOUCH VERIO) STRIP    USE 1 STRIP TO CHECK GLUCOSE FIVE TIMES DAILY AS NEEDED    CELECOXIB (CELEBREX) 200 MG CAPSULE    Take 1 capsule by mouth daily    CLINDAMYCIN (CLEOCIN) 300 MG CAPSULE    TAKE TWO CAPSULES BY MOUTH ONE HOUR BEFORE APPOINTMENT    CONTINUOUS BLOOD GLUC  (FREESTYLE NUBIA 14 DAY READER) XU    Please give 1 reader    CONTINUOUS BLOOD GLUC SENSOR (FREESTYLE NUBIA 14 DAY SENSOR) MISC    Inject 1 Device as directed every 14 days Use as directedUse as directed    DOCUSATE SODIUM (COLACE PO)    Take 400 mg by mouth every evening     DULOXETINE (CYMBALTA) 60 MG EXTENDED RELEASE CAPSULE    Take 1 capsule by mouth daily    EZETIMIBE (ZETIA) 10 MG TABLET    Take 10 mg by mouth daily Indications: takes 1-2 times per week     HYDROCHLOROTHIAZIDE (HYDRODIURIL) 12.5 MG TABLET    TAKE 1 TABLET BY MOUTH ONCE DAILY    INSULIN NPH (NOVOLIN N) 100 UNIT/ML INJECTION VIAL    30 units before breakfast, 40 units with supper, 15 units at bed time    INSULIN SYRINGES, DISPOSABLE, U-100 1 ML MISC    Use qid for dosing insulin as directed. Dx 250.00    IRBESARTAN (AVAPRO) 300 MG TABLET    Take 1 tablet by mouth once daily    LANCETS MISC    Test five times daily. METFORMIN (GLUCOPHAGE) 1000 MG TABLET    Take 1 tablet by mouth 2 times daily (with meals)    METOPROLOL (TOPROL XL) 50 MG XL TABLET    Take 50 mg by mouth nightly     MIRABEGRON (MYRBETRIQ) 50 MG TB24    Take 50 mg by mouth daily    NOVOLIN R 100 UNIT/ML INJECTION    INJECT SUBCUTANEOUSLY THREE TIMES DAILY UP  UNITS DAILY AS NEEDED    NOVOLOG 100 UNIT/ML INJECTION VIAL    INJECT 25 UNITS SUBCUTANEOUSLY THREE TIMES DAILY BEFORE MEAL(S), SLIDING SCALE  IN THE MORNING AND AT BEDTIME    POLYETHYLENE GLYCOL (GLYCOLAX) POWDER    Take 17 g by mouth daily    PREGABALIN (LYRICA) 150 MG CAPSULE    Take 1 capsule by mouth 3 times daily for 90 days.     ROSUVASTATIN (CRESTOR) 20 MG TABLET    Take 20 mg by mouth every evening     TADALAFIL (CIALIS) 5 MG TABLET    Take 1 tablet by mouth daily    ULTICARE INSULIN SYRINGE 30G X 1/2\" 1 ML MISC    USE 4 TIMES DAILY FOR DOSING INSULIN AS DIRECTED     ALLERGIES     Patient is is allergic to atorvastatin, shellfish allergy, simvastatin, bee pollen, iodine, seasonal, shellfish-derived products, and pcn [penicillins]. FAMILY HISTORY     Patient'sfamily history includes Cancer in his father; Diabetes in his brother, mother, and paternal grandmother; Heart Attack (age of onset: 71) in his paternal grandfather; Heart Attack (age of onset: 76) in his maternal grandfather; Heart Disease in his mother; High Blood Pressure in his mother; High Cholesterol in his brother and mother; [place] in his brother; No Known Problems in his daughter and sister; Other in his daughter and mother; Thyroid Disease in his daughter. HISTORY     Patient  reports that he has never smoked. He has never used smokeless tobacco. He reports current alcohol use of about 1.0 - 2.0 standard drink of alcohol per week. He reports that he does not use drugs. READY CARE COURSE   No orders of the defined types were placed in this encounter. Labs:  No results found for this visit on 03/15/22. IMAGING:  No orders to display     Scheduled Meds:  Continuous Infusions:  PRN Meds:.

## 2022-04-28 SDOH — HEALTH STABILITY: PHYSICAL HEALTH: ON AVERAGE, HOW MANY DAYS PER WEEK DO YOU ENGAGE IN MODERATE TO STRENUOUS EXERCISE (LIKE A BRISK WALK)?: 0 DAYS

## 2022-04-28 ASSESSMENT — LIFESTYLE VARIABLES
HAS A RELATIVE, FRIEND, DOCTOR, OR ANOTHER HEALTH PROFESSIONAL EXPRESSED CONCERN ABOUT YOUR DRINKING OR SUGGESTED YOU CUT DOWN: NO
HOW OFTEN DURING THE LAST YEAR HAVE YOU FAILED TO DO WHAT WAS NORMALLY EXPECTED FROM YOU BECAUSE OF DRINKING: 0
HOW OFTEN DURING THE LAST YEAR HAVE YOU HAD A FEELING OF GUILT OR REMORSE AFTER DRINKING: NEVER
HOW OFTEN DURING THE LAST YEAR HAVE YOU FAILED TO DO WHAT WAS NORMALLY EXPECTED FROM YOU BECAUSE OF DRINKING: NEVER
HOW OFTEN DURING THE LAST YEAR HAVE YOU BEEN UNABLE TO REMEMBER WHAT HAPPENED THE NIGHT BEFORE BECAUSE YOU HAD BEEN DRINKING: NEVER
HOW OFTEN DURING THE LAST YEAR HAVE YOU NEEDED AN ALCOHOLIC DRINK FIRST THING IN THE MORNING TO GET YOURSELF GOING AFTER A NIGHT OF HEAVY DRINKING: NEVER
HOW OFTEN DO YOU HAVE SIX OR MORE DRINKS ON ONE OCCASION: 2
HOW OFTEN DURING THE LAST YEAR HAVE YOU FOUND THAT YOU WERE NOT ABLE TO STOP DRINKING ONCE YOU HAD STARTED: 0
HAVE YOU OR SOMEONE ELSE BEEN INJURED AS A RESULT OF YOUR DRINKING: 0
HOW OFTEN DURING THE LAST YEAR HAVE YOU FOUND THAT YOU WERE NOT ABLE TO STOP DRINKING ONCE YOU HAD STARTED: NEVER
HOW MANY STANDARD DRINKS CONTAINING ALCOHOL DO YOU HAVE ON A TYPICAL DAY: 3 OR 4
HOW OFTEN DURING THE LAST YEAR HAVE YOU HAD A FEELING OF GUILT OR REMORSE AFTER DRINKING: 0
HOW OFTEN DURING THE LAST YEAR HAVE YOU NEEDED AN ALCOHOLIC DRINK FIRST THING IN THE MORNING TO GET YOURSELF GOING AFTER A NIGHT OF HEAVY DRINKING: 0
HOW OFTEN DURING THE LAST YEAR HAVE YOU BEEN UNABLE TO REMEMBER WHAT HAPPENED THE NIGHT BEFORE BECAUSE YOU HAD BEEN DRINKING: 0
HAS A RELATIVE, FRIEND, DOCTOR, OR ANOTHER HEALTH PROFESSIONAL EXPRESSED CONCERN ABOUT YOUR DRINKING OR SUGGESTED YOU CUT DOWN: 0
HOW MANY STANDARD DRINKS CONTAINING ALCOHOL DO YOU HAVE ON A TYPICAL DAY: 2
HAVE YOU OR SOMEONE ELSE BEEN INJURED AS A RESULT OF YOUR DRINKING: NO

## 2022-04-28 ASSESSMENT — PATIENT HEALTH QUESTIONNAIRE - PHQ9
SUM OF ALL RESPONSES TO PHQ QUESTIONS 1-9: 0
1. LITTLE INTEREST OR PLEASURE IN DOING THINGS: 0
SUM OF ALL RESPONSES TO PHQ QUESTIONS 1-9: 0
SUM OF ALL RESPONSES TO PHQ9 QUESTIONS 1 & 2: 0
2. FEELING DOWN, DEPRESSED OR HOPELESS: 0
SUM OF ALL RESPONSES TO PHQ QUESTIONS 1-9: 0
SUM OF ALL RESPONSES TO PHQ QUESTIONS 1-9: 0

## 2022-04-29 ENCOUNTER — OFFICE VISIT (OUTPATIENT)
Dept: FAMILY MEDICINE CLINIC | Age: 72
End: 2022-04-29
Payer: MEDICARE

## 2022-04-29 VITALS
BODY MASS INDEX: 34.65 KG/M2 | HEART RATE: 67 BPM | OXYGEN SATURATION: 97 % | HEIGHT: 72 IN | SYSTOLIC BLOOD PRESSURE: 120 MMHG | WEIGHT: 255.8 LBS | TEMPERATURE: 96.1 F | DIASTOLIC BLOOD PRESSURE: 60 MMHG | RESPIRATION RATE: 20 BRPM

## 2022-04-29 DIAGNOSIS — G63 POLYNEUROPATHY ASSOCIATED WITH UNDERLYING DISEASE (HCC): ICD-10-CM

## 2022-04-29 DIAGNOSIS — G89.29 CHRONIC PAIN OF RIGHT ANKLE: ICD-10-CM

## 2022-04-29 DIAGNOSIS — M25.571 CHRONIC PAIN OF RIGHT ANKLE: ICD-10-CM

## 2022-04-29 DIAGNOSIS — I48.0 PAROXYSMAL ATRIAL FIBRILLATION (HCC): ICD-10-CM

## 2022-04-29 DIAGNOSIS — I10 ESSENTIAL HYPERTENSION: ICD-10-CM

## 2022-04-29 DIAGNOSIS — E10.9 TYPE 1 DIABETES MELLITUS ON INSULIN THERAPY (HCC): ICD-10-CM

## 2022-04-29 DIAGNOSIS — G82.20 CHRONIC PROGRESSIVE PARAPARESIS (HCC): ICD-10-CM

## 2022-04-29 DIAGNOSIS — E66.01 SEVERE OBESITY (BMI 35.0-39.9) WITH COMORBIDITY (HCC): ICD-10-CM

## 2022-04-29 DIAGNOSIS — E10.42 POLYNEUROPATHY DUE TO TYPE 1 DIABETES MELLITUS (HCC): Primary | ICD-10-CM

## 2022-04-29 PROCEDURE — 99214 OFFICE O/P EST MOD 30 MIN: CPT | Performed by: NURSE PRACTITIONER

## 2022-04-29 PROCEDURE — 3051F HG A1C>EQUAL 7.0%<8.0%: CPT | Performed by: NURSE PRACTITIONER

## 2022-04-29 RX ORDER — HUMAN INSULIN 100 [IU]/ML
INJECTION, SOLUTION SUBCUTANEOUS
Qty: 30 ML | Refills: 2 | Status: SHIPPED | OUTPATIENT
Start: 2022-04-29 | End: 2022-07-29

## 2022-04-29 RX ORDER — BLOOD SUGAR DIAGNOSTIC
STRIP MISCELLANEOUS
Qty: 200 EACH | Refills: 11 | Status: SHIPPED | OUTPATIENT
Start: 2022-04-29

## 2022-04-29 RX ORDER — PREGABALIN 150 MG/1
150 CAPSULE ORAL 3 TIMES DAILY
Qty: 270 CAPSULE | Refills: 0 | Status: SHIPPED | OUTPATIENT
Start: 2022-04-29 | End: 2022-07-29 | Stop reason: SDUPTHER

## 2022-04-29 NOTE — PROGRESS NOTES
Diabetes Mellitus Type 2: Current symptoms/problems include neuropathy. Home blood sugar records:  trend: decreasing steadily  Any episodes of hypoglycemia? yes - has been occurring daily over the past few weeks. Known diabetic complications: peripheral neuropathy   He decreased dose of intermediate insulin and the meal time insulin. The low sugar insulin symptoms started a few weeks ago. This morning, he took 70 units of intermediate insulin. He has cut this down per day by 10 units if needed. Decreased rapid acting by 5 units/sliding scale. He uses a sliding scale for sliding scale and took 20 units this morning. Dinner time takes 80 units of intermediate. He reports flashing in the eyes and fatigue when sugars run lower. He states that he continues to get occasional fissures in the skin of his feet but has not had any recent cellulitis symptoms. Does try to use moisturizing lotion routinely. Chronic back pain/nerve pain/leg weakness: He states that he continues to have issues with chronic low back pain as well as leg weakness and nerve pain in the legs. He has been taking Lyrica for some time now with efficacy in reducing severity of symptoms. He reports no significant medication side effects. He tries to remain active daily and is able to get walking in every day. No recent significant falls reported. He has been having more issues with his right ankle lately. He does plan to establish with podiatry in the future and plans to follow with [facility]. Atrial fibrillation: he states that his last episode was last summer in the heat. He continues to have loop recorder in place. He was advised to start a blood thinning medication last summer through [facility] cardiologist but declined at that time and thus far this has been the only episodes while wearing the loop monitor internally. He has not had any lightheadedness or dizziness. No syncope or near syncope. No heart palpitations. ROS: This patient reports no chest pain or pressure. There is no shortness of breath or cough. The patient reports no nausea or vomiting. There is no heartburn or indigestion. There is no diarrhea or constipation. No black, bloody, mucusy or tarry stool noticed. The patient reports no bloating and no change in appetite. The five diabetic measures for control:   Hemoglobin A1C (%)   Date Value   01/28/2022 7.3     LDL Calculated (mg/dL)   Date Value   11/18/2021 46         Blood pressure less than 131/81,   BP Readings from Last 1 Encounters:   04/29/22 120/60     Smoking, non smoker is goal. This pt is not a smoker. This pt does an aspirin a day. Treatment Adherence:   Medication compliance:  compliant most of the time  Diet compliance:  compliant most of the time  Weight trend: stable  Current exercise: walks 1 time(s) per day  What might prevent you from meeting your goal?: none  Patient plan for overcoming barriers: N/A     Patient Confidence: 8/10    EXAM:  Constitutional Blood pressure 120/60, pulse 67, temperature 96.1 °F (35.6 °C), temperature source Temporal, resp. rate 20, height 5' 11.5\" (1.816 m), weight 255 lb 12.8 oz (116 kg), SpO2 97 %. ,normal affect, no acute distress, appears well developed and well nourished. Neck:  neck- supple, no mass, non-tender and no bruits  Lungs:  Normal expansion. Clear to auscultation. No rales, rhonchi, or wheezing., No chest wall tenderness. Heart:  Heart sounds are normal.  Regular rate and rhythm without murmur, gallop or rub. Abdomen:  Soft, non-tender, normal bowel sounds. No bruits, organomegaly or masses. Extremities: Extremities warm to touch, pink, with no edema. No significant difference in the timing of radial versus posterior tibial pulses. DIAGNOSIS:    Diagnosis Orders   1. Polyneuropathy due to type 1 diabetes mellitus (HCC)  pregabalin (LYRICA) 150 MG capsule   2.  Type 1 diabetes mellitus on insulin therapy (HCC)  NOVOLIN R 100 UNIT/ML injection    blood glucose test strips (ONETOUCH VERIO) strip   3. Essential hypertension     4. Severe obesity (BMI 35.0-39. 9) with comorbidity ([address]     5. Polyneuropathy associated with underlying disease ([address]     6. Chronic progressive paraparesis ([address]- secondary to spinal stenosis/surgal repaired     7. Chronic pain of right ankle     8. Paroxysmal atrial fibrillation (HCC)         PLAN: Include orders in the DX section. Diabetes Counseling   Patient was counseled regarding disease risks and adopting healthy behaviors. Patient was provided education materials to assist with self management. Patient was provided log (or received log during previous visit) to record blood pressure, food intake and/or blood sugar. Patient was instructed to keep log up-to-date and to always bring log to all office visits. 1.  2.  Patient aware of much improved hemoglobin A1c. With recent hypoglycemic episodes however the patient does plan to slowly titrate his insulin dosing back up a little bit. He feels that he has a good control over determining appropriate dosing for the food that he is eating after checking his glucose. He is encouraged to check his sugar levels 5 times per day currently. Updated prescription sent. 3.  Blood pressure is well controlled on current medication. Continue the same. 4.  Weight has overall been stable. He will continue to work on diet and increasing physical activity as tolerated. 5.  6.  Refill of Lyrica given to help with severe nerve pain. No medication side effects reported and he reports the medication to be beneficial in reducing severity of symptoms. 7.  He plans to establish care with [facility] in the future. No current acute foot issues raised during visit. 8.  He will continue to follow with [facility] cardiology. No recent episodes of atrial fibrillation.     Controlled Substance Monitoring:    Acute and Chronic Pain Monitoring:   RX Monitoring 4/29/2022   Attestation -   Periodic Controlled Substance Monitoring Possible medication side effects, risk of tolerance/dependence & alternative treatments discussed. ;No signs of potential drug abuse or diversion identified. ;Assessed functional status. Chronic Pain > 50 MEDD Obtained or confirmed \"Consent for Opioid Use\" on file. ;Re-evaluated the status of the patient's underlying condition causing pain. Please note this report has been partially produced using speech recognition software and may cause contain errors related to that system including grammar, punctuation and spelling as well as words and phrases that may seem inappropriate. If there are questions or concerns please feel free to contact me to clarify. Follow up: 3 months and as needed. Blood work one week prior as ordered.     Electronically signed by KANE Martini CNP-CNP, 2:26 PM 4/29/22

## 2022-05-24 DIAGNOSIS — M25.50 ARTHRALGIA, UNSPECIFIED JOINT: ICD-10-CM

## 2022-05-25 RX ORDER — CELECOXIB 200 MG/1
200 CAPSULE ORAL DAILY
Qty: 90 CAPSULE | Refills: 1 | Status: SHIPPED | OUTPATIENT
Start: 2022-05-25

## 2022-05-25 NOTE — TELEPHONE ENCOUNTER
Patient requesting medication refill.  Please approve or deny this request.    Rx requested:  Requested Prescriptions     Pending Prescriptions Disp Refills    celecoxib (CELEBREX) 200 MG capsule 90 capsule 1     Sig: Take 1 capsule by mouth daily         Last Office Visit:   4/29/2022      Next Visit Date:  Future Appointments   Date Time Provider Amado Aceves   7/29/2022  8:30 AM Aylin Pereira, [address] REVIEW OF SYSTEMS:  CONSTITUTIONAL: No fever, weight loss, or fatigue  RESPIRATORY: No cough, wheezing, chills or hemoptysis; No shortness of breath  CARDIOVASCULAR: No chest pain, palpitations, dizziness, or leg swelling  GASTROINTESTINAL: No abdominal pain, No nausea, vomiting, or hematemesis; No diarrhea or constipation  GENITOURINARY: No dysuria, frequency, hematuria, or incontinence  NEUROLOGICAL: No headaches, memory loss, loss of strength, numbness, or tremors  SKIN: No itching, burning, rashes, or lesions   MUSCULOSKELETAL: "Pain all over"          All other ROS reviewed and negative except as otherwise stated

## 2022-07-14 NOTE — TELEPHONE ENCOUNTER
Pharmacy is requesting medication refill.  Please approve or deny this request.    Rx requested:  Requested Prescriptions     Pending Prescriptions Disp Refills    metFORMIN (GLUCOPHAGE) 1000 MG tablet [Pharmacy Med Name: metFORMIN HCl 1000 MG Oral Tablet] 180 tablet 0     Sig: Take 1 tablet by mouth 2 times daily (with meals)         Last Office Visit:   4/29/2022      Next Visit Date:  Future Appointments   Date Time Provider Amado Aceves   7/29/2022  8:30 AM Robb Terry, [address]

## 2022-07-26 ENCOUNTER — HOSPITAL ENCOUNTER (OUTPATIENT)
Dept: LAB | Age: 72
Discharge: HOME | End: 2022-07-26
Payer: MEDICARE

## 2022-07-26 DIAGNOSIS — E10.9 TYPE 1 DIABETES MELLITUS ON INSULIN THERAPY (HCC): ICD-10-CM

## 2022-07-26 DIAGNOSIS — R35.1 BPH ASSOCIATED WITH NOCTURIA: ICD-10-CM

## 2022-07-26 DIAGNOSIS — N40.1 BPH ASSOCIATED WITH NOCTURIA: ICD-10-CM

## 2022-07-26 LAB
ALBUMIN SERPL-MCNC: 4.1 G/DL (ref 3.5–4.6)
ALP BLD-CCNC: 76 U/L (ref 35–104)
ALT SERPL-CCNC: 23 U/L (ref 0–41)
ANION GAP SERPL CALCULATED.3IONS-SCNC: 13 MEQ/L (ref 9–15)
AST SERPL-CCNC: 24 U/L (ref 0–40)
BASOPHILS ABSOLUTE: 0 K/UL (ref 0–0.2)
BASOPHILS RELATIVE PERCENT: 1.1 %
BILIRUB SERPL-MCNC: <0.2 MG/DL (ref 0.2–0.7)
BUN BLDV-MCNC: 15 MG/DL (ref 8–23)
CALCIUM SERPL-MCNC: 9.3 MG/DL (ref 8.5–9.9)
CHLORIDE BLD-SCNC: 102 MEQ/L (ref 95–107)
CHOLESTEROL, TOTAL: 103 MG/DL (ref 0–199)
CO2: 23 MEQ/L (ref 20–31)
CREAT SERPL-MCNC: 0.9 MG/DL (ref 0.7–1.2)
CREATININE URINE: 37.7 MG/DL
EOSINOPHILS ABSOLUTE: 0.2 K/UL (ref 0–0.7)
EOSINOPHILS RELATIVE PERCENT: 3.8 %
GFR AFRICAN AMERICAN: >60
GFR NON-AFRICAN AMERICAN: >60
GLOBULIN: 3.3 G/DL (ref 2.3–3.5)
GLUCOSE BLD-MCNC: 124 MG/DL (ref 70–99)
HBA1C MFR BLD: 6.8 % (ref 4.8–5.9)
HCT VFR BLD CALC: 41 % (ref 42–52)
HDLC SERPL-MCNC: 44 MG/DL (ref 40–59)
HEMOGLOBIN: 13.9 G/DL (ref 14–18)
LDL CHOLESTEROL CALCULATED: 35 MG/DL (ref 0–129)
LYMPHOCYTES ABSOLUTE: 1.1 K/UL (ref 1–4.8)
LYMPHOCYTES RELATIVE PERCENT: 26.2 %
MCH RBC QN AUTO: 32.5 PG (ref 27–31.3)
MCHC RBC AUTO-ENTMCNC: 33.9 % (ref 33–37)
MCV RBC AUTO: 95.9 FL (ref 80–100)
MICROALBUMIN UR-MCNC: 3.8 MG/DL
MICROALBUMIN/CREAT UR-RTO: 100.8 MG/G (ref 0–30)
MONOCYTES ABSOLUTE: 0.7 K/UL (ref 0.2–0.8)
MONOCYTES RELATIVE PERCENT: 16 %
NEUTROPHILS ABSOLUTE: 2.2 K/UL (ref 1.4–6.5)
NEUTROPHILS RELATIVE PERCENT: 52.9 %
PDW BLD-RTO: 14.1 % (ref 11.5–14.5)
PLATELET # BLD: 157 K/UL (ref 130–400)
POTASSIUM SERPL-SCNC: 5 MEQ/L (ref 3.4–4.9)
PROSTATE SPECIFIC ANTIGEN: 4.38 NG/ML (ref 0–4)
RBC # BLD: 4.27 M/UL (ref 4.7–6.1)
SODIUM BLD-SCNC: 138 MEQ/L (ref 135–144)
TOTAL PROTEIN: 7.4 G/DL (ref 6.3–8)
TRIGL SERPL-MCNC: 119 MG/DL (ref 0–150)
WBC # BLD: 4.2 K/UL (ref 4.8–10.8)

## 2022-07-26 PROCEDURE — 82570 ASSAY OF URINE CREATININE: CPT

## 2022-07-26 PROCEDURE — 85025 COMPLETE CBC W/AUTO DIFF WBC: CPT

## 2022-07-26 PROCEDURE — 80053 COMPREHEN METABOLIC PANEL: CPT

## 2022-07-26 PROCEDURE — 80061 LIPID PANEL: CPT

## 2022-07-26 PROCEDURE — 82043 UR ALBUMIN QUANTITATIVE: CPT

## 2022-07-26 PROCEDURE — 84153 ASSAY OF PSA TOTAL: CPT

## 2022-07-26 PROCEDURE — 36415 COLL VENOUS BLD VENIPUNCTURE: CPT

## 2022-07-26 PROCEDURE — 83036 HEMOGLOBIN GLYCOSYLATED A1C: CPT

## 2022-07-29 ENCOUNTER — OFFICE VISIT (OUTPATIENT)
Dept: FAMILY MEDICINE CLINIC | Age: 72
End: 2022-07-29
Payer: MEDICARE

## 2022-07-29 VITALS
WEIGHT: 260 LBS | HEIGHT: 72 IN | SYSTOLIC BLOOD PRESSURE: 126 MMHG | RESPIRATION RATE: 18 BRPM | BODY MASS INDEX: 35.21 KG/M2 | TEMPERATURE: 96.1 F | DIASTOLIC BLOOD PRESSURE: 58 MMHG | HEART RATE: 72 BPM | OXYGEN SATURATION: 97 %

## 2022-07-29 DIAGNOSIS — E10.42 POLYNEUROPATHY DUE TO TYPE 1 DIABETES MELLITUS (HCC): ICD-10-CM

## 2022-07-29 DIAGNOSIS — Z91.81 AT HIGH RISK FOR FALLS: ICD-10-CM

## 2022-07-29 DIAGNOSIS — E78.2 MIXED HYPERLIPIDEMIA: ICD-10-CM

## 2022-07-29 DIAGNOSIS — R35.1 BPH ASSOCIATED WITH NOCTURIA: ICD-10-CM

## 2022-07-29 DIAGNOSIS — E10.40 TYPE 1 DIABETES MELLITUS WITH DIABETIC NEUROPATHY, UNSPECIFIED (HCC): ICD-10-CM

## 2022-07-29 DIAGNOSIS — M15.9 PRIMARY OSTEOARTHRITIS INVOLVING MULTIPLE JOINTS: ICD-10-CM

## 2022-07-29 DIAGNOSIS — I48.0 PAROXYSMAL ATRIAL FIBRILLATION (HCC): ICD-10-CM

## 2022-07-29 DIAGNOSIS — I25.10 CORONARY ARTERY DISEASE INVOLVING NATIVE CORONARY ARTERY OF NATIVE HEART WITHOUT ANGINA PECTORIS: ICD-10-CM

## 2022-07-29 DIAGNOSIS — N40.1 BPH ASSOCIATED WITH NOCTURIA: ICD-10-CM

## 2022-07-29 DIAGNOSIS — E10.9 TYPE 1 DIABETES MELLITUS ON INSULIN THERAPY (HCC): ICD-10-CM

## 2022-07-29 DIAGNOSIS — I10 ESSENTIAL HYPERTENSION: ICD-10-CM

## 2022-07-29 DIAGNOSIS — G82.20 CHRONIC PROGRESSIVE PARAPARESIS (HCC): ICD-10-CM

## 2022-07-29 DIAGNOSIS — E10.9 TYPE 1 DIABETES MELLITUS ON INSULIN THERAPY (HCC): Primary | ICD-10-CM

## 2022-07-29 DIAGNOSIS — G63 POLYNEUROPATHY ASSOCIATED WITH UNDERLYING DISEASE (HCC): ICD-10-CM

## 2022-07-29 PROCEDURE — 1123F ACP DISCUSS/DSCN MKR DOCD: CPT | Performed by: NURSE PRACTITIONER

## 2022-07-29 PROCEDURE — 3044F HG A1C LEVEL LT 7.0%: CPT | Performed by: NURSE PRACTITIONER

## 2022-07-29 PROCEDURE — 99214 OFFICE O/P EST MOD 30 MIN: CPT | Performed by: NURSE PRACTITIONER

## 2022-07-29 RX ORDER — PREGABALIN 150 MG/1
150 CAPSULE ORAL 3 TIMES DAILY
Qty: 270 CAPSULE | Refills: 0 | Status: SHIPPED | OUTPATIENT
Start: 2022-07-29 | End: 2022-10-29 | Stop reason: SDUPTHER

## 2022-07-29 RX ORDER — HUMAN INSULIN 100 [IU]/ML
INJECTION, SOLUTION SUBCUTANEOUS
Qty: 90 ML | Refills: 0 | Status: SHIPPED | OUTPATIENT
Start: 2022-07-29 | End: 2022-08-29 | Stop reason: SDUPTHER

## 2022-07-29 SDOH — ECONOMIC STABILITY: FOOD INSECURITY: WITHIN THE PAST 12 MONTHS, THE FOOD YOU BOUGHT JUST DIDN'T LAST AND YOU DIDN'T HAVE MONEY TO GET MORE.: NEVER TRUE

## 2022-07-29 SDOH — ECONOMIC STABILITY: FOOD INSECURITY: WITHIN THE PAST 12 MONTHS, YOU WORRIED THAT YOUR FOOD WOULD RUN OUT BEFORE YOU GOT MONEY TO BUY MORE.: NEVER TRUE

## 2022-07-29 ASSESSMENT — SOCIAL DETERMINANTS OF HEALTH (SDOH): HOW HARD IS IT FOR YOU TO PAY FOR THE VERY BASICS LIKE FOOD, HOUSING, MEDICAL CARE, AND HEATING?: NOT HARD AT ALL

## 2022-07-29 NOTE — TELEPHONE ENCOUNTER
Future Appointments    Encounter Information    Provider Department Appt Notes   10/27/2022 Aamir Alvarez, [address] Primary Care 3 month f/u diabetes per [facility]       Past Visits    Date Provider Specialty Visit Type Primary Dx   07/29/2022 KANE Whitley - CNP Family Medicine Office Visit Type 1 diabetes mellitus on insulin therapy [facility])   04/29/2022 KANE Solis - CNP Family Medicine Office Visit Polyneuropathy due to type 1 diabetes mellitus ([facility]

## 2022-07-29 NOTE — PROGRESS NOTES
Subjective:     Diabetes Mellitus Type 2: Current symptoms/problems include neuropathy. Home blood sugar records:  trend: decreasing steadily  Any episodes of hypoglycemia? no  Tobacco history: He  reports that he has never smoked. He has never used smokeless tobacco.   Known diabetic complications: peripheral neuropathy and cardiovascular disease  He states that he has been able to significantly decrease the amount of insulin that he requires throughout the day. Has been having less hypoglycemic episodes as well. Getting about the same amount of physical activity as he has been. States that Lyrica continues to offer some relief and severe nerve pain symptoms. No medication side effects reported. Generalized osteoarthritis/chronic low back pain: He states that he has been taking Celebrex for some time but does not seem to be working as well as it used to. He is interested in trying a different type of anti-inflammatory if possible. Is currently using over-the-counter Voltaren gel on his knees which is helpful but he has pain widespread and would like to discuss further. Hypertension:  Home blood pressure monitoring: No.  He is adherent to a low sodium diet. Antihypertensive medication side effects: no medication side effects noted. Use of agents associated with hypertension: none. Hyperlipidemia:  No new myalgias or GI upset on rosuvastatin (Crestor). CAD/Atrial Fibrillation: We will follow routinely with [facility] cardiology and states that he had his chip checked 2 weeks ago. No atrial fibrillation since January. No medication side effects reported. Continues low-salt diet. No lightheadedness or dizziness. No heart palpitations. BPH: He states that he still gets up to urinate a couple times a night but drinks a lot of water during the day. No change in urinary patterns.   No dysuria, hematuria, dribbling or difficulty initiating urine stream.  No weakening of urine flow reported. The five diabetic measures for control:   Hemoglobin A1C (%)   Date Value   07/26/2022 6.8 (H)     LDL Calculated (mg/dL)   Date Value   07/26/2022 35         Blood pressure less than 131/81,   BP Readings from Last 1 Encounters:   07/29/22 (!) 126/58     Smoking, non smoker is goal. This pt is not a smoker. This pt does an aspirin a day. Last eye exam was 2022  Last diabetic foot exam was 2022  Last urine microalbumin creatinine ratio was 2022    PMH: This 67 y.o. male  patient is  hypertensive, is  diabetic, is  hyperlipidemic. He has no history of smoking and has a  family history of heart disease. He is  obese. Review of Systems  Patient denies chest pain, lightheadedness, blurred vision, peripheral edema, and palpitations. Eyes: no rapid change in vision  Ears, nose, mouth, throat, and face: no changes in hearing or sore throat  Respiratory: No shortness of breath or cough. Cardiovascular: no chest pain or pressure. This patient reports no polyuria, polydipsia or episodes of hypoglycemia. Treatment Adherence:   Medication compliance:  compliant most of the time  Diet compliance:  compliant most of the time  Weight trend: stable  Current exercise: walks 1 time(s) per day  What might prevent you from meeting your goal?: none  Patient plan for overcoming barriers: N/A     Patient Confidence: 8/10      Objective:   EXAM:  Constitutional Blood pressure (!) 126/58, pulse 72, temperature (!) 96.1 °F (35.6 °C), temperature source Temporal, resp. rate 18, height 6' (1.829 m), weight 260 lb (117.9 kg), SpO2 97 %. .  He has a normal affect, no acute distress, appears well developed and well nourished. Neck:  neck- supple, no mass, non-tender and no bruits  Lungs:  Normal expansion. Clear to auscultation. No rales, rhonchi, or wheezing., No chest wall tenderness. Heart:  Heart sounds are normal.  Regular rate and rhythm without murmur, gallop or rub.   Abdomen:  Soft, non-tender, normal bowel sounds. No bruits, organomegaly or masses. Extremities: 1+ edema of  right ankle (pitting). Assessment:      Diagnosis Orders   1. Type 1 diabetes mellitus on insulin therapy (HCC)  CBC with Auto Differential    Comprehensive Metabolic Panel    Lipid Panel    Hemoglobin A1C    Microalbumin / Creatinine Urine Ratio      2. Type 1 diabetes mellitus with diabetic neuropathy, unspecified        3. Polyneuropathy associated with underlying disease ([address]        4. Essential hypertension        5. Mixed hyperlipidemia        6. Paroxysmal atrial fibrillation (HCC)        7. Coronary artery disease involving native coronary artery of native heart without angina pectoris        8. BPH associated with nocturia  PSA, Diagnostic      9. Polyneuropathy due to type 1 diabetes mellitus (HCC)  pregabalin (LYRICA) 150 MG capsule      10. At high risk for falls        11. Chronic progressive paraparesis ([facility]        12. Primary osteoarthritis involving multiple joints  diclofenac (VOLTAREN) 50 MG EC tablet          PLAN: Include orders in the DX section. Diabetes Counseling   Patient was counseled regarding disease risks and adopting healthy behaviors. Patient was provided education materials to assist with self management. Patient was provided log (or received log during previous visit) to record blood pressure, food intake and/or blood sugar. Patient was instructed to keep log up-to-date and to always bring log to all office visits. Follow up: 3 months and as needed. Blood work one week prior as ordered. 1.  2.  3.  9.  Diabetes is well controlled on current insulin regimen and diet/physical activity plan. No recently reported hypoglycemic episodes. Continue the same medication. No medication side effects reported. He is commended for excellent glucose management. Lyrica continues to help with chronic nerve pain and no worsening of symptoms reported. No medication side effects and refill given.     4. Blood pressure is well controlled on current medication with no side effects. Continue the same. 5.  Lipid panel is at goal on statin. No side effects reported. Continue the same. 6.  7.  He continues to follow routinely with cardiology and states that he has not had any recent cardiac symptoms. No reported recurrence of atrial fibrillation. Lowest heart rate was in the 40s 1 morning but he has had no symptomatic bradycardia. 8.  Discussed elevating PSA now greater than 4. He has had no change in chronic urinary symptoms. He will notify me should this change. Discussed option of referral to urology, ultrasound of the prostate or repeating PSA level in 3 months. He would like to repeat the lab work in 3 months and will be willing to have testing/consult at that time if advised. 10.  He will continue changing positions slowly and notify me if any significant falls between visits. 11.  12.  We will trial oral Voltaren for osteoarthritis of multiple joints. Notify me of any medication side effects. He is aware of need to stop taking the Celebrex. Will leave on the medication list in the event that he prefers to go back to the Celebrex if Voltaren is not effective. Or if any side effects. Controlled Substance Monitoring:    Acute and Chronic Pain Monitoring:   RX Monitoring 7/29/2022   Attestation -   Periodic Controlled Substance Monitoring Possible medication side effects, risk of tolerance/dependence & alternative treatments discussed. ;No signs of potential drug abuse or diversion identified. ;Assessed functional status. Chronic Pain > 50 MEDD Obtained or confirmed \"Consent for Opioid Use\" on file. ;Re-evaluated the status of the patient's underlying condition causing pain.        Please note this report has been partially produced using speech recognition software and may cause contain errors related to that system including grammar, punctuation and spelling as well as words and phrases that may seem inappropriate. If there are questions or concerns please feel free to contact me to clarify. Electronically signed by KANE Zhu - CNP-CNP, 10:34 AM 7/29/22        On the basis of positive falls risk screening, assessment and plan is as follows: home safety tips provided.

## 2022-08-29 ENCOUNTER — PATIENT MESSAGE (OUTPATIENT)
Dept: FAMILY MEDICINE CLINIC | Age: 72
End: 2022-08-29

## 2022-08-29 DIAGNOSIS — E10.9 TYPE 1 DIABETES MELLITUS ON INSULIN THERAPY (HCC): ICD-10-CM

## 2022-08-29 RX ORDER — HUMAN INSULIN 100 [IU]/ML
INJECTION, SOLUTION SUBCUTANEOUS
Qty: 9 ML | Refills: 1 | Status: SHIPPED | OUTPATIENT
Start: 2022-08-29

## 2022-08-29 NOTE — TELEPHONE ENCOUNTER
Patient is requesting medication refill.  Please approve or deny this request.    Rx requested:  Requested Prescriptions     Pending Prescriptions Disp Refills    NOVOLIN R 100 UNIT/ML injection 9 mL 1     Sig: INJECT SUBCUTANEOUSLY THREE TIMES DAILY UP  UNITS  AS NEEDED         Last Office Visit:   7/29/2022      Next Visit Date:  Future Appointments   Date Time Provider Amado Aceves   10/27/2022  1:40 PM KANE Ross - CNP [address]

## 2022-08-29 NOTE — TELEPHONE ENCOUNTER
From: Loida Starks  To: Saundra Alvarez  Sent: 8/29/2022 10:46 AM EDT  Subject: Geovanny MAEZCUA    I need a prescription on this for 90 days/1yr please.  100U per day/1 bottle per 10day or 9 bottles for 90days Thanks [address]

## 2022-10-16 DIAGNOSIS — M15.9 PRIMARY OSTEOARTHRITIS INVOLVING MULTIPLE JOINTS: ICD-10-CM

## 2022-10-17 NOTE — TELEPHONE ENCOUNTER
Comments:     Last Office Visit (last PCP visit):   7/29/2022    Next Visit Date:  Future Appointments   Date Time Provider Amado Aceves   10/27/2022  1:45 PM KANE Savage - CNP [address]       **If hasn't been seen in over a year OR hasn't followed up according to last diabetes/ADHD visit, make appointment for patient before sending refill to provider.     Rx requested:  Requested Prescriptions     Pending Prescriptions Disp Refills    diclofenac (VOLTAREN) 50 MG EC tablet [Pharmacy Med Name: Diclofenac Sodium 50 MG Oral Tablet Delayed Release] 60 tablet 0     Sig: TAKE 1 TABLET BY MOUTH IN THE MORNING AND 1 AT NOON AND 1 IN THE EVENING WITH MEALS

## 2022-10-21 ENCOUNTER — HOSPITAL ENCOUNTER (OUTPATIENT)
Dept: LAB | Age: 72
Discharge: HOME | End: 2022-10-21
Payer: MEDICARE

## 2022-10-21 DIAGNOSIS — N40.1 BPH ASSOCIATED WITH NOCTURIA: ICD-10-CM

## 2022-10-21 DIAGNOSIS — R35.1 BPH ASSOCIATED WITH NOCTURIA: ICD-10-CM

## 2022-10-21 DIAGNOSIS — E10.9 TYPE 1 DIABETES MELLITUS ON INSULIN THERAPY (HCC): ICD-10-CM

## 2022-10-21 LAB
ALBUMIN SERPL-MCNC: 4.1 G/DL (ref 3.5–4.6)
ALP BLD-CCNC: 79 U/L (ref 35–104)
ALT SERPL-CCNC: 21 U/L (ref 0–41)
ANION GAP SERPL CALCULATED.3IONS-SCNC: 13 MEQ/L (ref 9–15)
AST SERPL-CCNC: 17 U/L (ref 0–40)
BASOPHILS ABSOLUTE: 0 K/UL (ref 0–0.2)
BASOPHILS RELATIVE PERCENT: 1 %
BILIRUB SERPL-MCNC: 0.3 MG/DL (ref 0.2–0.7)
BUN BLDV-MCNC: 27 MG/DL (ref 8–23)
CALCIUM SERPL-MCNC: 9.4 MG/DL (ref 8.5–9.9)
CHLORIDE BLD-SCNC: 103 MEQ/L (ref 95–107)
CHOLESTEROL, TOTAL: 120 MG/DL (ref 0–199)
CO2: 21 MEQ/L (ref 20–31)
CREAT SERPL-MCNC: 1 MG/DL (ref 0.7–1.2)
CREATININE URINE: 99.7 MG/DL
EOSINOPHILS ABSOLUTE: 0.2 K/UL (ref 0–0.7)
EOSINOPHILS RELATIVE PERCENT: 4.7 %
GFR SERPL CREATININE-BSD FRML MDRD: >60 ML/MIN/{1.73_M2}
GLOBULIN: 2.8 G/DL (ref 2.3–3.5)
GLUCOSE BLD-MCNC: 90 MG/DL (ref 70–99)
HBA1C MFR BLD: 6.8 % (ref 4.8–5.9)
HCT VFR BLD CALC: 39.7 % (ref 42–52)
HDLC SERPL-MCNC: 38 MG/DL (ref 40–59)
HEMOGLOBIN: 13.4 G/DL (ref 14–18)
LDL CHOLESTEROL CALCULATED: 29 MG/DL (ref 0–129)
LYMPHOCYTES ABSOLUTE: 0.9 K/UL (ref 1–4.8)
LYMPHOCYTES RELATIVE PERCENT: 20.1 %
MCH RBC QN AUTO: 32.4 PG (ref 27–31.3)
MCHC RBC AUTO-ENTMCNC: 33.9 % (ref 33–37)
MCV RBC AUTO: 95.6 FL (ref 79–92.2)
MICROALBUMIN UR-MCNC: 6.4 MG/DL
MICROALBUMIN/CREAT UR-RTO: 64.2 MG/G (ref 0–30)
MONOCYTES ABSOLUTE: 0.5 K/UL (ref 0.2–0.8)
MONOCYTES RELATIVE PERCENT: 10.4 %
NEUTROPHILS ABSOLUTE: 2.9 K/UL (ref 1.4–6.5)
NEUTROPHILS RELATIVE PERCENT: 63.8 %
PDW BLD-RTO: 13.8 % (ref 11.5–14.5)
PLATELET # BLD: 160 K/UL (ref 130–400)
POTASSIUM SERPL-SCNC: 4.2 MEQ/L (ref 3.4–4.9)
PROSTATE SPECIFIC ANTIGEN: 3.6 NG/ML (ref 0–4)
RBC # BLD: 4.15 M/UL (ref 4.7–6.1)
SODIUM BLD-SCNC: 137 MEQ/L (ref 135–144)
TOTAL PROTEIN: 6.9 G/DL (ref 6.3–8)
TRIGL SERPL-MCNC: 263 MG/DL (ref 0–150)
WBC # BLD: 4.6 K/UL (ref 4.8–10.8)

## 2022-10-21 PROCEDURE — 85025 COMPLETE CBC W/AUTO DIFF WBC: CPT

## 2022-10-21 PROCEDURE — 83036 HEMOGLOBIN GLYCOSYLATED A1C: CPT

## 2022-10-21 PROCEDURE — 84153 ASSAY OF PSA TOTAL: CPT

## 2022-10-21 PROCEDURE — 82570 ASSAY OF URINE CREATININE: CPT

## 2022-10-21 PROCEDURE — 36415 COLL VENOUS BLD VENIPUNCTURE: CPT

## 2022-10-21 PROCEDURE — 80061 LIPID PANEL: CPT

## 2022-10-21 PROCEDURE — 80053 COMPREHEN METABOLIC PANEL: CPT

## 2022-10-21 PROCEDURE — 82043 UR ALBUMIN QUANTITATIVE: CPT

## 2022-10-27 DIAGNOSIS — E10.42 POLYNEUROPATHY DUE TO TYPE 1 DIABETES MELLITUS (HCC): ICD-10-CM

## 2022-10-29 DIAGNOSIS — E10.42 POLYNEUROPATHY DUE TO TYPE 1 DIABETES MELLITUS (HCC): ICD-10-CM

## 2022-10-29 RX ORDER — PREGABALIN 150 MG/1
150 CAPSULE ORAL 3 TIMES DAILY
Qty: 270 CAPSULE | Refills: 0 | Status: SHIPPED | OUTPATIENT
Start: 2022-10-29 | End: 2022-12-05 | Stop reason: SDUPTHER

## 2022-11-01 RX ORDER — PREGABALIN 150 MG/1
CAPSULE ORAL
Qty: 270 CAPSULE | Refills: 0 | OUTPATIENT
Start: 2022-11-01

## 2022-11-01 NOTE — TELEPHONE ENCOUNTER
Provider Department Appt Notes   12/5/2022 Oliva Alvarez, [address] Primary Care 3 month f/u diabetes per Cristy Watkins cp/Rescheduled from 10/27/2022 cp

## 2022-11-02 NOTE — TELEPHONE ENCOUNTER
Last Office Visit (last PCP visit):   7/29/2022    Next Visit Date:  Future Appointments   Date Time Provider Amado Aceves   12/5/2022  7:30 AM Isidro Alvarez, APRN - CNP [address]       **If hasn't been seen in over a year OR hasn't followed up according to last diabetes/ADHD visit, make appointment for patient before sending refill to provider.     Rx requested:  Requested Prescriptions     Pending Prescriptions Disp Refills    metFORMIN (GLUCOPHAGE) 1000 MG tablet 180 tablet 0     Sig: Take 1 tablet by mouth 2 times daily (with meals)

## 2022-11-13 DIAGNOSIS — M15.9 PRIMARY OSTEOARTHRITIS INVOLVING MULTIPLE JOINTS: ICD-10-CM

## 2022-11-15 NOTE — TELEPHONE ENCOUNTER
Pharmacy is requesting medication refill.  Please approve or deny this request.    Rx requested:  Requested Prescriptions     Pending Prescriptions Disp Refills    diclofenac (VOLTAREN) 50 MG EC tablet 60 tablet 0     Sig: Take 1 tablet by mouth 2 times daily (with meals)         Last Office Visit:   7/29/2022      Next Visit Date:  Future Appointments   Date Time Provider Amado Aceves   12/5/2022  7:30 AM Kya Rubin, [address]

## 2022-12-05 ENCOUNTER — OFFICE VISIT (OUTPATIENT)
Dept: FAMILY MEDICINE CLINIC | Age: 72
End: 2022-12-05
Payer: MEDICARE

## 2022-12-05 VITALS
DIASTOLIC BLOOD PRESSURE: 68 MMHG | SYSTOLIC BLOOD PRESSURE: 134 MMHG | HEIGHT: 72 IN | BODY MASS INDEX: 35.21 KG/M2 | OXYGEN SATURATION: 98 % | TEMPERATURE: 97.1 F | WEIGHT: 260 LBS | HEART RATE: 63 BPM | RESPIRATION RATE: 14 BRPM

## 2022-12-05 DIAGNOSIS — E10.42 POLYNEUROPATHY DUE TO TYPE 1 DIABETES MELLITUS (HCC): ICD-10-CM

## 2022-12-05 DIAGNOSIS — G82.20 CHRONIC PROGRESSIVE PARAPARESIS (HCC): ICD-10-CM

## 2022-12-05 DIAGNOSIS — N40.1 BPH ASSOCIATED WITH NOCTURIA: ICD-10-CM

## 2022-12-05 DIAGNOSIS — R35.1 BPH ASSOCIATED WITH NOCTURIA: ICD-10-CM

## 2022-12-05 DIAGNOSIS — I25.10 CORONARY ARTERY DISEASE INVOLVING NATIVE CORONARY ARTERY OF NATIVE HEART WITHOUT ANGINA PECTORIS: ICD-10-CM

## 2022-12-05 DIAGNOSIS — E10.9 TYPE 1 DIABETES MELLITUS ON INSULIN THERAPY (HCC): ICD-10-CM

## 2022-12-05 DIAGNOSIS — M15.9 PRIMARY OSTEOARTHRITIS INVOLVING MULTIPLE JOINTS: ICD-10-CM

## 2022-12-05 DIAGNOSIS — I10 ESSENTIAL HYPERTENSION: ICD-10-CM

## 2022-12-05 DIAGNOSIS — E78.2 MIXED HYPERLIPIDEMIA: ICD-10-CM

## 2022-12-05 DIAGNOSIS — E10.40 TYPE 1 DIABETES MELLITUS WITH DIABETIC NEUROPATHY, UNSPECIFIED (HCC): Primary | ICD-10-CM

## 2022-12-05 DIAGNOSIS — I48.0 PAROXYSMAL ATRIAL FIBRILLATION (HCC): ICD-10-CM

## 2022-12-05 DIAGNOSIS — M48.062 LUMBAR STENOSIS WITH NEUROGENIC CLAUDICATION: ICD-10-CM

## 2022-12-05 PROCEDURE — 1123F ACP DISCUSS/DSCN MKR DOCD: CPT | Performed by: NURSE PRACTITIONER

## 2022-12-05 PROCEDURE — 3074F SYST BP LT 130 MM HG: CPT | Performed by: NURSE PRACTITIONER

## 2022-12-05 PROCEDURE — 99214 OFFICE O/P EST MOD 30 MIN: CPT | Performed by: NURSE PRACTITIONER

## 2022-12-05 PROCEDURE — 3044F HG A1C LEVEL LT 7.0%: CPT | Performed by: NURSE PRACTITIONER

## 2022-12-05 PROCEDURE — 3078F DIAST BP <80 MM HG: CPT | Performed by: NURSE PRACTITIONER

## 2022-12-05 RX ORDER — PREGABALIN 150 MG/1
150 CAPSULE ORAL 3 TIMES DAILY
Qty: 270 CAPSULE | Refills: 0 | Status: SHIPPED | OUTPATIENT
Start: 2023-01-27 | End: 2023-04-27

## 2022-12-05 RX ORDER — HUMAN INSULIN 100 [IU]/ML
INJECTION, SOLUTION SUBCUTANEOUS
Qty: 9 ML | Refills: 1 | Status: SHIPPED | OUTPATIENT
Start: 2022-12-05

## 2022-12-05 NOTE — PROGRESS NOTES
Subjective:   Diabetes Mellitus Type 2: Current symptoms/problems include neuropathy. Home blood sugar records:    trend: decreasing steadily  Any episodes of hypoglycemia? no  Tobacco history: He  reports that he has never smoked. He has never used smokeless tobacco.   Known diabetic complications: peripheral neuropathy and cardiovascular disease. Continues to follow with [facility] cardiology and has not been alerted of any new episodes of atrial fibrillation. He has not noticed any palpitations or lightheadedness or dizziness. Loop recorder intact. Hypertension:  Home blood pressure monitoring: No.  He is adherent to a low sodium diet. Antihypertensive medication side effects: no medication side effects noted. Use of agents associated with hypertension: none. Hyperlipidemia:  No new myalgias or GI upset on rosuvastatin (Crestor). Chronic low back pain/progressive paraparesis: He states that he continues to take Lyrica to help with chronic nerve pain and he has not had any side effects. Medication is effective in reducing severity of symptoms. He is also noted that taking Voltaren 3 times a day with food has been more helpful with overall arthritic pain and back pain. He has not had any side effects with this. 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 The five diabetic measures for control:   Hemoglobin A1C (%)   Date Value   10/21/2022 6.8 (H)     LDL Calculated (mg/dL)   Date Value   10/21/2022 29         Blood pressure less than 131/81,   BP Readings from Last 1 Encounters:   12/05/22 134/68     Smoking, non smoker is goal. This pt is not a smoker. This pt does an aspirin a day.      Last eye exam was 2022  Last diabetic foot exam was 2022  Last urine microalbumin creatinine ratio was 2022    PMH: This 67 y.o. male  patient is hypertensive, is  diabetic, is  hyperlipidemic. He has no history of smoking and has a  family history of heart disease. He is  obese. Review of Systems  Patient denies chest pain, shortness of breath, lightheadedness, peripheral edema, and palpitations. Eyes: no rapid change in vision  Ears, nose, mouth, throat, and face: no changes in hearing or sore throat  Respiratory: No shortness of breath or cough. No chronic constipation or diarrhea reported. No dark tarry or bloody stools. No mucus in the stools. This patient reports no polyuria, polydipsia or episodes of hypoglycemia. He does not note any change in chronic urinary symptoms. No hematuria or dysuria. Treatment Adherence:   Medication compliance:  compliant most of the time  Diet compliance:  compliant most of the time  Weight trend: stable  Current exercise: walks 1 time(s) per day  What might prevent you from meeting your goal?: none  Patient plan for overcoming barriers: N/A     Patient Confidence: 8/10      Objective:   EXAM:  Constitutional Blood pressure 134/68, pulse 63, temperature 97.1 °F (36.2 °C), temperature source Temporal, resp. rate 14, height 6' (1.829 m), weight 260 lb (117.9 kg), SpO2 98 %. .  He has a normal affect, no acute distress, appears well developed and well nourished. Neck:  neck- supple, no mass, non-tender and no bruits  Lungs:  Normal expansion. Clear to auscultation. No rales, rhonchi, or wheezing., No chest wall tenderness. Heart:  Heart sounds are normal.  Regular rate and rhythm without murmur, gallop or rub. Abdomen:  Soft, non-tender, normal bowel sounds. No bruits, organomegaly or masses. Extremities: Extremities warm to touch, pink, with no edema. Assessment:      Diagnosis Orders   1. Type 1 diabetes mellitus with diabetic neuropathy, unspecified        2. Essential hypertension        3. Mixed hyperlipidemia        4.  Primary osteoarthritis involving multiple joints  diclofenac (VOLTAREN) 50 MG EC tablet      5. Chronic progressive paraparesis ([address]        6. BPH associated with nocturia        7. Lumbar stenosis with neurogenic claudication        8. Paroxysmal atrial fibrillation (HCC)        9. Coronary artery disease involving native coronary artery of native heart without angina pectoris        10. Polyneuropathy due to type 1 diabetes mellitus ([facility])  pregabalin (LYRICA) 150 MG capsule      11. Type 1 diabetes mellitus on insulin therapy ([facility])  insulin NPH (NOVOLIN N) 100 UNIT/ML injection vial    NOVOLIN R 100 UNIT/ML injection          PLAN: Include orders in the DX section. Diabetes Counseling   Patient was counseled regarding disease risks and adopting healthy behaviors. Patient was provided education materials to assist with self management. Patient was provided log (or received log during previous visit) to record blood pressure, food intake and/or blood sugar. Patient was instructed to keep log up-to-date and to always bring log to all office visits. Follow up: 3 months and as needed. He will plan to establish care with new provider early next year. Diabetes is well controlled with current insulin regimen. No significant hypoglycemic episodes reported lately. Continue current medication and diet and increase physical activity as he can tolerate. Refills of medication given. Lyrica has been helpful in reducing severity of nerve pain symptoms secondary to lumbar stenosis with chronic progressive paraparesis and long-term type 1 diabetes. No medication side effects reported. Doing well on Voltaren 3 times daily an updated prescription given. Take with food. He will plan to continue to follow with [facility] cardiology and has not had any recent runs of atrial fibrillation. No symptoms of acute fluid overload or unstable angina reported. Continue current medications and doses as there are no side effects.     No new onset urinary symptoms and PSA is improved when compared to last check. Controlled Substance Monitoring:    Acute and Chronic Pain Monitoring:   RX Monitoring 12/5/2022   Attestation -   Periodic Controlled Substance Monitoring Possible medication side effects, risk of tolerance/dependence & alternative treatments discussed. ;No signs of potential drug abuse or diversion identified. ;Assessed functional status. Chronic Pain > 50 MEDD Obtained or confirmed \"Consent for Opioid Use\" on file. ;Re-evaluated the status of the patient's underlying condition causing pain. Please note this report has been partially produced using speech recognition software and may cause contain errors related to that system including grammar, punctuation and spelling as well as words and phrases that may seem inappropriate. If there are questions or concerns please feel free to contact me to clarify.       Electronically signed by KANE Obrien CNP-CNP, 1:04 PM 12/5/22

## 2022-12-22 DIAGNOSIS — E10.9 TYPE 1 DIABETES MELLITUS ON INSULIN THERAPY (HCC): ICD-10-CM

## 2022-12-22 NOTE — TELEPHONE ENCOUNTER
Patient is requesting medication refill. Please approve or deny this request.    Rx requested:  Requested Prescriptions     Pending Prescriptions Disp Refills    insulin aspart (NOVOLOG) 100 UNIT/ML injection vial 30 mL 0     Sig: INJECT 25 UNITS SUBCUTANEOUSLY THREE TIMES DAILY BEFORE MEAL(S), SLIDING SCALE  IN THE MORNING AND AT BEDTIME         Last Office Visit:   12/5/2022      Next Visit Date:  No future appointments.

## 2023-01-09 ENCOUNTER — OFFICE VISIT (OUTPATIENT)
Dept: FAMILY MEDICINE CLINIC | Age: 73
End: 2023-01-09

## 2023-01-09 ENCOUNTER — HOSPITAL ENCOUNTER (OUTPATIENT)
Dept: ULTRASOUND IMAGING | Age: 73
Discharge: HOME | End: 2023-01-11
Payer: MEDICARE

## 2023-01-09 ENCOUNTER — HOSPITAL ENCOUNTER (OUTPATIENT)
Dept: LAB | Age: 73
Discharge: HOME | End: 2023-01-09
Payer: MEDICARE

## 2023-01-09 VITALS
BODY MASS INDEX: 34.78 KG/M2 | WEIGHT: 256.8 LBS | TEMPERATURE: 97.7 F | OXYGEN SATURATION: 97 % | HEART RATE: 84 BPM | SYSTOLIC BLOOD PRESSURE: 118 MMHG | DIASTOLIC BLOOD PRESSURE: 64 MMHG | HEIGHT: 72 IN

## 2023-01-09 DIAGNOSIS — L03.115 CELLULITIS OF RIGHT LOWER EXTREMITY: Primary | ICD-10-CM

## 2023-01-09 DIAGNOSIS — M79.661 PAIN AND SWELLING OF RIGHT LOWER LEG: ICD-10-CM

## 2023-01-09 DIAGNOSIS — M79.89 PAIN AND SWELLING OF RIGHT LOWER LEG: ICD-10-CM

## 2023-01-09 DIAGNOSIS — I82.4Y1 ACUTE DEEP VEIN THROMBOSIS (DVT) OF PROXIMAL VEIN OF RIGHT LOWER EXTREMITY (HCC): ICD-10-CM

## 2023-01-09 DIAGNOSIS — Z87.2 HISTORY OF CELLULITIS: ICD-10-CM

## 2023-01-09 LAB
ANION GAP SERPL CALCULATED.3IONS-SCNC: 12 MEQ/L (ref 9–15)
BASOPHILS ABSOLUTE: 0 K/UL (ref 0–0.2)
BASOPHILS RELATIVE PERCENT: 0.5 %
BUN BLDV-MCNC: 21 MG/DL (ref 8–23)
CALCIUM SERPL-MCNC: 9.2 MG/DL (ref 8.5–9.9)
CHLORIDE BLD-SCNC: 100 MEQ/L (ref 95–107)
CO2: 26 MEQ/L (ref 20–31)
CREAT SERPL-MCNC: 1.06 MG/DL (ref 0.7–1.2)
EOSINOPHILS ABSOLUTE: 0.3 K/UL (ref 0–0.7)
EOSINOPHILS RELATIVE PERCENT: 4 %
GFR SERPL CREATININE-BSD FRML MDRD: >60 ML/MIN/{1.73_M2}
GLUCOSE BLD-MCNC: 178 MG/DL (ref 70–99)
HCT VFR BLD CALC: 38.8 % (ref 42–52)
HEMOGLOBIN: 13 G/DL (ref 14–18)
LYMPHOCYTES ABSOLUTE: 1 K/UL (ref 1–4.8)
LYMPHOCYTES RELATIVE PERCENT: 14.5 %
MCH RBC QN AUTO: 32.3 PG (ref 27–31.3)
MCHC RBC AUTO-ENTMCNC: 33.5 % (ref 33–37)
MCV RBC AUTO: 96.3 FL (ref 79–92.2)
MONOCYTES ABSOLUTE: 0.8 K/UL (ref 0.2–0.8)
MONOCYTES RELATIVE PERCENT: 12 %
NEUTROPHILS ABSOLUTE: 4.6 K/UL (ref 1.4–6.5)
NEUTROPHILS RELATIVE PERCENT: 69 %
PDW BLD-RTO: 13.3 % (ref 11.5–14.5)
PLATELET # BLD: 171 K/UL (ref 130–400)
POTASSIUM SERPL-SCNC: 5.2 MEQ/L (ref 3.4–4.9)
RBC # BLD: 4.03 M/UL (ref 4.7–6.1)
SODIUM BLD-SCNC: 138 MEQ/L (ref 135–144)
WBC # BLD: 6.6 K/UL (ref 4.8–10.8)

## 2023-01-09 PROCEDURE — 36415 COLL VENOUS BLD VENIPUNCTURE: CPT

## 2023-01-09 PROCEDURE — 85025 COMPLETE CBC W/AUTO DIFF WBC: CPT

## 2023-01-09 PROCEDURE — 93971 EXTREMITY STUDY: CPT

## 2023-01-09 PROCEDURE — 80048 BASIC METABOLIC PNL TOTAL CA: CPT

## 2023-01-09 RX ORDER — DOXYCYCLINE HYCLATE 100 MG
100 TABLET ORAL 2 TIMES DAILY
Qty: 28 TABLET | Refills: 0 | Status: SHIPPED | OUTPATIENT
Start: 2023-01-09 | End: 2023-01-23

## 2023-01-09 ASSESSMENT — PATIENT HEALTH QUESTIONNAIRE - PHQ9
5. POOR APPETITE OR OVEREATING: 0
SUM OF ALL RESPONSES TO PHQ QUESTIONS 1-9: 0
6. FEELING BAD ABOUT YOURSELF - OR THAT YOU ARE A FAILURE OR HAVE LET YOURSELF OR YOUR FAMILY DOWN: 0
SUM OF ALL RESPONSES TO PHQ QUESTIONS 1-9: 0
7. TROUBLE CONCENTRATING ON THINGS, SUCH AS READING THE NEWSPAPER OR WATCHING TELEVISION: 0
SUM OF ALL RESPONSES TO PHQ9 QUESTIONS 1 & 2: 0
SUM OF ALL RESPONSES TO PHQ QUESTIONS 1-9: 0
1. LITTLE INTEREST OR PLEASURE IN DOING THINGS: 0
SUM OF ALL RESPONSES TO PHQ QUESTIONS 1-9: 0
8. MOVING OR SPEAKING SO SLOWLY THAT OTHER PEOPLE COULD HAVE NOTICED. OR THE OPPOSITE, BEING SO FIGETY OR RESTLESS THAT YOU HAVE BEEN MOVING AROUND A LOT MORE THAN USUAL: 0
2. FEELING DOWN, DEPRESSED OR HOPELESS: 0
3. TROUBLE FALLING OR STAYING ASLEEP: 0
10. IF YOU CHECKED OFF ANY PROBLEMS, HOW DIFFICULT HAVE THESE PROBLEMS MADE IT FOR YOU TO DO YOUR WORK, TAKE CARE OF THINGS AT HOME, OR GET ALONG WITH OTHER PEOPLE: 0
9. THOUGHTS THAT YOU WOULD BE BETTER OFF DEAD, OR OF HURTING YOURSELF: 0
4. FEELING TIRED OR HAVING LITTLE ENERGY: 0

## 2023-01-09 ASSESSMENT — ENCOUNTER SYMPTOMS
DIARRHEA: 0
NAUSEA: 0
ABDOMINAL PAIN: 0
COLOR CHANGE: 1

## 2023-01-09 NOTE — PROGRESS NOTES
Subjective  Estkarey Gonzalez, 67 y.o. male presents today with:  Chief Complaint   Patient presents with    Other     Pt states having celliutis on right leg pt states sx's started Thursday with fever and having flu like sx's pt states Friday leg was getting red and sensitive to the touch Saturday was very painful and swollen pt has taken antibiotics from last time having celliutis       HPI  Presents to [facility] for recurrence of cellulitis  Affected site RLE  Initial episode cellulitis after he ran into something and cut is leg    Last occurrence 3/15/22  Symptoms present as flu-like and erythema of right shin   Tmax 99.2 Thursday with chills   Friday morning doing okay but by Friday night, swelling from under right knee to his ankle   Painful to touch his shin  Denies drainage from site   Daily RLE swelling has increased. Experiencing pain with ambulation d/t edema   Edema extends into ankle and foot   Denies pain in ankle joint nor foot   Wears compression stocking daily      With his last occurrence felt responsive to keflex over clindamycin  He had left over clindamycin. Took 2 tablets on Frida night & 2 tablets BID Saturday and Sunday     Blood sugars running at his baseline of 130's  Off Flypad 2 years ago d/t high risk for falls       Spoke with patient 1/11.  States edema down 50% from yesterday   Declines pain medication                 Past Medical History:   Diagnosis Date    Arthritis     Bell's palsy 2002    CAD (coronary artery disease)     no stents    DM w/ coma type I, uncontrolled 10/25/2019    ED (erectile dysfunction)     GERD (gastroesophageal reflux disease)     Herpes zoster 2000    Hyperactivity of bladder 1/10/2019    Hyperlipidemia     meds > 20 yrs    Hyperopia of both eyes with astigmatism and presbyopia 1/6/2017    Hypertension     meds > 20 yrs    Microalbuminuria due to type 1 diabetes mellitus ([facility] 12/5/2019    Nuclear sclerotic cataract of right eye 1/6/2017    Obesity Pancytopenia ([facility] [address] 2008    Paroxysmal atrial fibrillation ([facility] 9/5/2019    Polyneuropathy due to type 1 diabetes mellitus ([address] 7/7/2020    Type 2 diabetes mellitus with diabetic polyneuropathy, with long-term current use of insulin ([address] 11/14/2017    hx since 1988 / Insulin dependent 1991      Past Surgical History:   Procedure Laterality Date    CARDIAC CATHETERIZATION  2007    CATARACT REMOVAL Left 12/18/2013    [place] with IOL OS    CATARACT REMOVAL Right 12/17/2021    COLONOSCOPY  2004    DR GUADARRAMA    COLONOSCOPY  01/28/2016    diverticulosis, int hemorrhoids, 5y repeat (DR MANTILLA)    CYST REMOVAL Bilateral 1987    testicular cyst     JOINT REPLACEMENT  2013    LTKR    KNEE ARTHROSCOPY Right 12/2009    LAMINECTOMY N/A 3/27/2020    L 2-3, 3-4, 4-5 MICRODECOMPRESSION performed by Marco Houston MD at [facility]    scrotal    LUMBAR SPINE SURGERY Bilateral 03/27/2020    bilateral L2-L3, L3-L4, L4-L5 microdissection decompression (DR SANCHEZ)    TONSILLECTOMY  1954    TOTAL KNEE ARTHROPLASTY Left 2013    DR OKEEFE     Family History   Problem Relation Age of Onset    Diabetes Mother     High Blood Pressure Mother     High Cholesterol Mother     Heart Disease Mother     Other Mother         recurrent UTI    Cancer Father         esophagus (smoker)    No Known Problems Sister     Diabetes Brother     High Cholesterol Brother     Macular Degen Brother     Heart Attack Maternal Grandfather 76    Diabetes Paternal Grandmother     Heart Attack Paternal Grandfather 71    Thyroid Disease Daughter     Other Daughter         GI & sinus issues    No Known Problems Daughter        Review of Systems   Constitutional:  Positive for activity change, chills and fatigue. Negative for appetite change, diaphoresis and fever. Cardiovascular:  Negative for palpitations. Gastrointestinal:  Negative for abdominal pain, diarrhea and nausea. Musculoskeletal:  Positive for gait problem. Negative for neck stiffness. Skin:  Positive for color change. Neurological:  Negative for dizziness, light-headedness and headaches. Psychiatric/Behavioral:  Negative for sleep disturbance. PMH, Surgical Hx, Family Hx, and Social Hx reviewed and updated. Objective  Vitals:    23 1022   BP: 118/64   Site: Right Upper Arm   Position: Sitting   Cuff Size: Large Adult   Pulse: 84   Temp: 97.7 °F (36.5 °C)   TempSrc: Temporal   SpO2: 97%   Weight: 256 lb 12.8 oz (116.5 kg)   Height: 6' (1.829 m)     BP Readings from Last 3 Encounters:   23 118/64   22 134/68   22 (!) 126/58     Wt Readings from Last 3 Encounters:   23 256 lb 12.8 oz (116.5 kg)   22 260 lb (117.9 kg)   22 260 lb (117.9 kg)         Physical Exam  Vitals reviewed. Constitutional:       General: He is not in acute distress. Appearance: He is not ill-appearing or toxic-appearing. HENT:      Right Ear: External ear normal.      Left Ear: External ear normal.      Nose: Nose normal.      Mouth/Throat:      Lips: Pink. Eyes:      General: Lids are normal. Vision grossly intact. Conjunctiva/sclera: Conjunctivae normal.   Cardiovascular:      Rate and Rhythm: Normal rate. Pulses:           Dorsalis pedis pulses are 1+ on the right side. Pulmonary:      Effort: Pulmonary effort is normal.   Musculoskeletal:      Cervical back: Normal range of motion. No rigidity. Right lower le+ Pitting Edema present. Skin:     General: Skin is warm and dry. Findings: Erythema (right shin) present. Neurological:      General: No focal deficit present. Mental Status: He is alert and oriented to person, place, and time. Assessment & Plan    Diagnosis Orders   1. Cellulitis of right lower extremity  CBC with Auto Differential    Basic Metabolic Panel      2.  Pain and swelling of right lower leg  US DUP LOWER EXTREMITY RIGHT MALLORY    doxycycline hyclate (VIBRA-TABS) 100 MG tablet    CBC with Auto Differential    Basic Metabolic Panel      3. History of cellulitis  cefTRIAXone (ROCEPHIN) 1,000 mg in lidocaine 1 % 2.86 mL IM Injection    doxycycline hyclate (VIBRA-TABS) 100 MG tablet    CBC with Auto Differential    Basic Metabolic Panel        Orders Placed This Encounter   Procedures    US DUP LOWER EXTREMITY RIGHT MALLORY     Standing Status:   Future     Number of Occurrences:   1     Standing Expiration Date:   1/9/2024     Order Specific Question:   Reason for exam:     Answer:   RLE swelling and discomfort     Order Specific Question:   Reason for exam:     Answer:   r/o DVT    CBC with Auto Differential     Standing Status:   Future     Standing Expiration Date:   3/6/5583    Basic Metabolic Panel     Standing Status:   Future     Standing Expiration Date:   1/9/2024       Orders Placed This Encounter   Medications    cefTRIAXone (ROCEPHIN) 1,000 mg in lidocaine 1 % 2.86 mL IM Injection     Order Specific Question:   Antimicrobial Indications     Answer:   Skin and Soft Tissue Infection     Order Specific Question:   Skin duration of therapy     Answer: Other     Order Specific Question:   Other Skin and Soft Tissue Infection Duration     Answer:   cellulitis    doxycycline hyclate (VIBRA-TABS) 100 MG tablet     Sig: Take 1 tablet by mouth 2 times daily for 14 days     Dispense:  28 tablet     Refill:  0         Return if symptoms worsen or fail to improve, for follow up with PCP. Reviewed with the patient: current clinical status & medications. Side effects, adverse effects of the medication prescribed today, as well as treatment plan/rationale and result expectations have been discussed with the patient who expressed understanding. How can you care for yourself at home? Take your antibiotics as directed. Do not stop taking them just because you feel better. You need to take the full course of antibiotics. Prop up the infected area on pillows to reduce pain and swelling.  Try to keep the area above the level of your heart as often as you can. If your doctor told you how to care for your wound, follow your doctor's instructions. If you did not get instructions, follow this general advice:  Wash the wound with clean water 2 times a day. Don't use hydrogen peroxide or alcohol, which can slow healing. Close follow up to evaluate treatment results and for coordination of care. I have reviewed the patient's medical history in detail and updated the computerized patient record.       KANE Kamara NP

## 2023-01-11 ASSESSMENT — VISUAL ACUITY: OU: 1

## 2023-01-19 ENCOUNTER — HOSPITAL ENCOUNTER (OUTPATIENT)
Dept: LAB | Age: 73
Discharge: HOME | End: 2023-01-19
Payer: MEDICARE

## 2023-01-19 ENCOUNTER — OFFICE VISIT (OUTPATIENT)
Dept: FAMILY MEDICINE CLINIC | Age: 73
End: 2023-01-19

## 2023-01-19 VITALS
HEIGHT: 72 IN | TEMPERATURE: 96.5 F | SYSTOLIC BLOOD PRESSURE: 124 MMHG | RESPIRATION RATE: 16 BRPM | OXYGEN SATURATION: 98 % | WEIGHT: 253 LBS | HEART RATE: 58 BPM | BODY MASS INDEX: 34.27 KG/M2 | DIASTOLIC BLOOD PRESSURE: 60 MMHG

## 2023-01-19 DIAGNOSIS — G63 POLYNEUROPATHY ASSOCIATED WITH UNDERLYING DISEASE (HCC): ICD-10-CM

## 2023-01-19 DIAGNOSIS — L03.115 CELLULITIS OF RIGHT LOWER EXTREMITY: ICD-10-CM

## 2023-01-19 DIAGNOSIS — E10.40 TYPE 1 DIABETES MELLITUS WITH DIABETIC NEUROPATHY, UNSPECIFIED (HCC): ICD-10-CM

## 2023-01-19 DIAGNOSIS — G82.20 CHRONIC PROGRESSIVE PARAPARESIS (HCC): ICD-10-CM

## 2023-01-19 DIAGNOSIS — R97.20 ELEVATED PSA: ICD-10-CM

## 2023-01-19 DIAGNOSIS — I48.0 PAROXYSMAL ATRIAL FIBRILLATION (HCC): ICD-10-CM

## 2023-01-19 DIAGNOSIS — D61.818 PANCYTOPENIA (HCC): ICD-10-CM

## 2023-01-19 DIAGNOSIS — L03.115 CELLULITIS OF RIGHT LOWER EXTREMITY: Primary | ICD-10-CM

## 2023-01-19 LAB
BASOPHILS ABSOLUTE: 0.1 K/UL (ref 0–0.2)
BASOPHILS RELATIVE PERCENT: 0.9 %
CREATININE URINE: 88.2 MG/DL
EOSINOPHILS ABSOLUTE: 0.2 K/UL (ref 0–0.7)
EOSINOPHILS RELATIVE PERCENT: 3.4 %
HBA1C MFR BLD: 7.1 % (ref 4.8–5.9)
HCT VFR BLD CALC: 39.5 % (ref 42–52)
HEMOGLOBIN: 12.9 G/DL (ref 14–18)
LYMPHOCYTES ABSOLUTE: 1 K/UL (ref 1–4.8)
LYMPHOCYTES RELATIVE PERCENT: 15.1 %
MCH RBC QN AUTO: 31.1 PG (ref 27–31.3)
MCHC RBC AUTO-ENTMCNC: 32.6 % (ref 33–37)
MCV RBC AUTO: 95.2 FL (ref 79–92.2)
MICROALBUMIN UR-MCNC: 6.6 MG/DL
MICROALBUMIN/CREAT UR-RTO: 74.8 MG/G (ref 0–30)
MONOCYTES ABSOLUTE: 0.4 K/UL (ref 0.2–0.8)
MONOCYTES RELATIVE PERCENT: 6.1 %
NEUTROPHILS ABSOLUTE: 4.9 K/UL (ref 1.4–6.5)
NEUTROPHILS RELATIVE PERCENT: 74.5 %
PDW BLD-RTO: 13.3 % (ref 11.5–14.5)
PLATELET # BLD: 258 K/UL (ref 130–400)
PROSTATE SPECIFIC ANTIGEN: 5.67 NG/ML (ref 0–4)
RBC # BLD: 4.15 M/UL (ref 4.7–6.1)
WBC # BLD: 6.5 K/UL (ref 4.8–10.8)

## 2023-01-19 PROCEDURE — 83036 HEMOGLOBIN GLYCOSYLATED A1C: CPT

## 2023-01-19 PROCEDURE — 36415 COLL VENOUS BLD VENIPUNCTURE: CPT

## 2023-01-19 PROCEDURE — 82043 UR ALBUMIN QUANTITATIVE: CPT

## 2023-01-19 PROCEDURE — 84153 ASSAY OF PSA TOTAL: CPT

## 2023-01-19 PROCEDURE — 82570 ASSAY OF URINE CREATININE: CPT

## 2023-01-19 PROCEDURE — 87040 BLOOD CULTURE FOR BACTERIA: CPT

## 2023-01-19 PROCEDURE — 85025 COMPLETE CBC W/AUTO DIFF WBC: CPT

## 2023-01-19 RX ORDER — LEVOFLOXACIN 750 MG/1
750 TABLET ORAL DAILY
Qty: 10 TABLET | Refills: 0 | Status: SHIPPED | OUTPATIENT
Start: 2023-01-19 | End: 2023-01-29

## 2023-01-19 ASSESSMENT — ENCOUNTER SYMPTOMS
DIARRHEA: 0
COLOR CHANGE: 1
COUGH: 0
CHEST TIGHTNESS: 0
SHORTNESS OF BREATH: 0
NAUSEA: 0

## 2023-01-19 NOTE — PROGRESS NOTES
[facility] [address] Encounter        ASSESSMENT/PLAN     Abi Rdz is a 67 y.o. male who presents with:  Cellulitis of the right lower extremity subsequent visit. Patient reports no improvement or worsening since having been treated in the [facility] on the ninth. He was treated with Rocephin 1 g IM and placed on doxycycline. Prior to that he was taking Keflex. He is taking his antibiotics regularly. Last blood glucose reading this morning was 88. It was 130 before bed      1. Cellulitis of right lower extremity  Patient reports mild tenderness to the posterior calf of the right leg there is 2+ pitting edema around the ankle. Erythremia begins just below the knee continues to the ankle. There are no ulcerations blisters or deep fissures on the foot or heel. There is dry flaking skin. Negative ultrasound for DVT of the right leg on the ninth. -     levoFLOXacin (LEVAQUIN) 750 MG tablet; Take 1 tablet by mouth daily for 10 days, Disp-10 tablet, R-0Normal  -Call into the clinic within 48 hours and notify me if there is improvement or worsening of the erythremia.  -     CBC with Auto Differential; Future  -     Culture, Blood 1; Future  3. Pancytopenia ([address]  Recent lab work demonstrates stable blood counts. 4. Polyneuropathy associated with underlying disease ([address]  Symptoms are stable no progression, well controlled on Lyrica, continue current treatment  5. Type 1 diabetes mellitus with diabetic neuropathy, unspecified ([address]  Patient reports stable blood glucose levels in the past week. Last urine microalbumin to creatinine ratio was elevated. We will continue current treatment pending following work-up. -     Hemoglobin A1C; Future  -     Microalbumin / Creatinine Urine Ratio; Future  6. Paroxysmal atrial fibrillation (HCC)  Patient reports no symptoms of shortness of breath or palpitations recently. He was discontinued off of anticoagulation due to recurrent falls. Current treatment with metoprolol, will continue current treatments. 7. Elevated PSA  Last PSA screening was mildly elevated. He has not undergone any further evaluation or medication therapy. No changes pending PSA reevaluation.  -     PSA, Diagnostic; Future         PATIENT REFERRED TO:  Return in about 2 days (around 1/21/2023), or Follow up by phone to discuss progression of symptoms, for Follow up with PCP. DISCHARGE MEDICATIONS:  New Prescriptions    LEVOFLOXACIN (LEVAQUIN) 750 MG TABLET    Take 1 tablet by mouth daily for 10 days     Cannot display discharge medications since this is not an admission. Erickson Novoa, APRN - CNP    CHIEF COMPLAINT       Chief Complaint   Patient presents with    Follow-up     Pt f/u after ready care visit on 1/9         SUBJECTIVE/REVIEW OF SYSTEMS     Review of Systems   Constitutional:  Negative for chills, fatigue, fever and unexpected weight change. HENT: Negative. Eyes:  Negative for visual disturbance. Respiratory:  Negative for cough, chest tightness and shortness of breath. Cardiovascular:  Positive for leg swelling. Negative for chest pain and palpitations. Gastrointestinal:  Negative for diarrhea and nausea. Endocrine: Negative for polydipsia and polyuria. Genitourinary: Negative. Musculoskeletal: Negative. Skin:  Positive for color change. Negative for wound. Neurological:  Negative for weakness, light-headedness, numbness and headaches. Hematological:  Does not bruise/bleed easily. Psychiatric/Behavioral:  Negative for confusion. The patient is not nervous/anxious. OBJECTIVE/PHYSICAL EXAM     Physical Exam  Constitutional:       General: He is not in acute distress. Appearance: Normal appearance. HENT:      Head: Normocephalic.       Nose: Nose normal.      Mouth/Throat:      Mouth: Mucous membranes are moist.   Eyes:      Conjunctiva/sclera: Conjunctivae normal.   Cardiovascular:      Rate and Rhythm: Normal rate.      Pulses: Normal pulses. Heart sounds: Normal heart sounds. No murmur heard. Pulmonary:      Effort: Pulmonary effort is normal.      Breath sounds: Rhonchi present. Musculoskeletal:         General: No swelling, tenderness, deformity or signs of injury. Normal range of motion. Cervical back: Normal range of motion and neck supple. No rigidity. Right lower le+ Edema present. Left lower leg: No edema. Feet:      Right foot:      Skin integrity: Skin breakdown, erythema, warmth and dry skin present. No ulcer, blister or fissure. Skin:     General: Skin is warm and dry. Capillary Refill: Capillary refill takes less than 2 seconds. Coloration: Skin is not pale. Findings: Erythema present. Neurological:      General: No focal deficit present. Mental Status: He is alert and oriented to person, place, and time. Motor: No weakness. Coordination: Coordination normal.      Gait: Gait normal.   Psychiatric:         Mood and Affect: Mood normal.         Speech: Speech normal.         Behavior: Behavior normal.         Thought Content:  Thought content normal.       VITALS  BP: 124/60, Temp: (!) 96.5 °F (35.8 °C), Temp Source: Temporal, Heart Rate: 58, Resp: 16, SpO2: 98 %      PAST MEDICAL HISTORY         Diagnosis Date    Arthritis     Bell's palsy     CAD (coronary artery disease)     no stents    DM w/ coma type I, uncontrolled 10/25/2019    ED (erectile dysfunction)     GERD (gastroesophageal reflux disease)     Herpes zoster     Hyperactivity of bladder 1/10/2019    Hyperlipidemia     meds > 20 yrs    Hyperopia of both eyes with astigmatism and presbyopia 2017    Hypertension     meds > 20 yrs    Microalbuminuria due to type 1 diabetes mellitus ([address] 2019    Nuclear sclerotic cataract of right eye 2017    Obesity     Pancytopenia ([address]     Paroxysmal atrial fibrillation ([address] 2019    Polyneuropathy due to type 1 diabetes mellitus ([facility]) 7/7/2020    Type 2 diabetes mellitus with diabetic polyneuropathy, with long-term current use of insulin ([facility]) 11/14/2017    hx since 1988 / Insulin dependent 1991     SURGICAL HISTORY     Patient  has a past surgical history that includes lipoma resection (1987); Knee arthroscopy (Right, 12/2009); Colonoscopy (2004); Cataract removal (Left, 12/18/2013); Colonoscopy (01/28/2016); Total knee arthroplasty (Left, 2013); cyst removal (Bilateral, 1987); Cardiac catheterization (2007); Tonsillectomy (1954); joint replacement (2013); Lumbar spine surgery (Bilateral, 03/27/2020); laminectomy (N/A, 3/27/2020); and Cataract removal (Right, 12/17/2021).  CURRENT MEDICATIONS       Previous Medications    ACETAMINOPHEN (TYLENOL) 500 MG TABLET    Take 1,000 mg by mouth every 6 hours as needed for Pain    ASPIRIN 81 MG TABLET    Take 81 mg by mouth daily    BLOOD GLUCOSE TEST STRIPS (ONETOUCH VERIO) STRIP    USE 1 STRIP TO CHECK GLUCOSE FIVE TIMES DAILY AS INSTRUCTED    CLINDAMYCIN (CLEOCIN) 300 MG CAPSULE    TAKE TWO CAPSULES BY MOUTH ONE HOUR BEFORE APPOINTMENT    CONTINUOUS BLOOD GLUC  (FREESTYLE NUBIA 14 DAY READER) XU    Please give 1 reader    CONTINUOUS BLOOD GLUC SENSOR (FREESTYLE NUBIA 14 DAY SENSOR) MISC    Inject 1 Device as directed every 14 days Use as directedUse as directed    DICLOFENAC (VOLTAREN) 50 MG EC TABLET    Take 1 tablet by mouth 3 times daily    DOCUSATE SODIUM (COLACE PO)    Take 400 mg by mouth every evening     DOXYCYCLINE HYCLATE (VIBRA-TABS) 100 MG TABLET    Take 1 tablet by mouth 2 times daily for 14 days    DULOXETINE (CYMBALTA) 60 MG EXTENDED RELEASE CAPSULE    Take 1 capsule by mouth daily    EZETIMIBE (ZETIA) 10 MG TABLET    Take 10 mg by mouth daily Indications: takes 1-2 times per week     INSULIN NPH (NOVOLIN N) 100 UNIT/ML INJECTION VIAL    30 units before breakfast, 40 units with supper, 15 units at bed time    INSULIN SYRINGES, DISPOSABLE, U-100 1 ML MISC   Use qid for dosing insulin as directed. Dx 250.00    IRBESARTAN (AVAPRO) 300 MG TABLET    Take 1 tablet by mouth once daily    LANCETS MISC    Test five times daily. METFORMIN (GLUCOPHAGE) 1000 MG TABLET    Take 1 tablet by mouth 2 times daily (with meals)    METOPROLOL SUCCINATE (TOPROL XL) 50 MG EXTENDED RELEASE TABLET    Take 50 mg by mouth nightly     MIRABEGRON (MYRBETRIQ) 50 MG TB24    Take 50 mg by mouth daily    NOVOLIN R 100 UNIT/ML INJECTION    INJECT SUBCUTANEOUSLY THREE TIMES DAILY UP  UNITS  AS NEEDED    NOVOLOG 100 UNIT/ML INJECTION VIAL    INJECT 25 UNITS SUBCUTANEOUSLY THREE TIMES DAILY BEFORE MEAL(S), SLIDING SCALE  IN THE MORNING AND AT BEDTIME    POLYETHYLENE GLYCOL (GLYCOLAX) POWDER    Take 17 g by mouth daily    PREGABALIN (LYRICA) 150 MG CAPSULE    Take 1 capsule by mouth 3 times daily for 90 days. ROSUVASTATIN (CRESTOR) 20 MG TABLET    Take 20 mg by mouth every evening     TADALAFIL (CIALIS) 5 MG TABLET    Take 1 tablet by mouth daily    ULTICARE INSULIN SYRINGE 30G X 1/2\" 1 ML MISC    USE 4 TIMES DAILY FOR DOSING INSULIN AS DIRECTED     ALLERGIES     Patient is is allergic to atorvastatin, shellfish allergy, simvastatin, bee pollen, iodine, seasonal, shellfish-derived products, and pcn [penicillins]. FAMILY HISTORY     Patient'sfamily history includes Cancer in his father; Diabetes in his brother, mother, and paternal grandmother; Heart Attack (age of onset: 71) in his paternal grandfather; Heart Attack (age of onset: 76) in his maternal grandfather; Heart Disease in his mother; High Blood Pressure in his mother; High Cholesterol in his brother and mother; Youlanda Stalling in his brother; No Known Problems in his daughter and sister; Other in his daughter and mother; Thyroid Disease in his daughter. HISTORY     Patient  reports that he has never smoked. He has never used smokeless tobacco. He reports current alcohol use of about 1.0 - 2.0 standard drink per week.  He reports that he does not use drugs. READY CARE COURSE     Orders Placed This Encounter   Procedures    Culture, Blood 1     Standing Status:   Future     Standing Expiration Date:   1/19/2024    CBC with Auto Differential     Standing Status:   Future     Standing Expiration Date:   1/19/2024    Hemoglobin A1C     Standing Status:   Future     Standing Expiration Date:   1/19/2024    Microalbumin / Creatinine Urine Ratio     Standing Status:   Future     Standing Expiration Date:   1/19/2024    PSA, Diagnostic     Standing Status:   Future     Standing Expiration Date:   1/19/2024        Labs:  No results found for this visit on 01/19/23. IMAGING:  No orders to display     Scheduled Meds:  Continuous Infusions:  PRN Meds:.

## 2023-01-20 LAB — BLOOD CULTURE, ROUTINE: NORMAL

## 2023-01-24 ENCOUNTER — HOSPITAL ENCOUNTER (OUTPATIENT)
Dept: WOUND CARE | Age: 73
Discharge: HOME | End: 2023-01-24
Payer: MEDICARE

## 2023-01-24 VITALS
DIASTOLIC BLOOD PRESSURE: 73 MMHG | HEART RATE: 90 BPM | TEMPERATURE: 97.5 F | RESPIRATION RATE: 16 BRPM | SYSTOLIC BLOOD PRESSURE: 154 MMHG

## 2023-01-24 DIAGNOSIS — L03.115 CELLULITIS OF RIGHT LOWER LEG: Primary | ICD-10-CM

## 2023-01-24 DIAGNOSIS — B35.3 TINEA PEDIS, UNSPECIFIED LATERALITY: Chronic | ICD-10-CM

## 2023-01-24 PROCEDURE — 99203 OFFICE O/P NEW LOW 30 MIN: CPT

## 2023-01-24 RX ORDER — BETAMETHASONE DIPROPIONATE 0.05 %
OINTMENT (GRAM) TOPICAL ONCE
OUTPATIENT
Start: 2023-01-24 | End: 2023-01-24

## 2023-01-24 RX ORDER — LIDOCAINE 50 MG/G
OINTMENT TOPICAL ONCE
OUTPATIENT
Start: 2023-01-24 | End: 2023-01-24

## 2023-01-24 RX ORDER — LIDOCAINE HYDROCHLORIDE 20 MG/ML
JELLY TOPICAL ONCE
OUTPATIENT
Start: 2023-01-24 | End: 2023-01-24

## 2023-01-24 RX ORDER — LIDOCAINE HYDROCHLORIDE 40 MG/ML
SOLUTION TOPICAL ONCE
OUTPATIENT
Start: 2023-01-24 | End: 2023-01-24

## 2023-01-24 RX ORDER — GINSENG 100 MG
CAPSULE ORAL ONCE
OUTPATIENT
Start: 2023-01-24 | End: 2023-01-24

## 2023-01-24 RX ORDER — BACITRACIN, NEOMYCIN, POLYMYXIN B 400; 3.5; 5 [USP'U]/G; MG/G; [USP'U]/G
OINTMENT TOPICAL ONCE
OUTPATIENT
Start: 2023-01-24 | End: 2023-01-24

## 2023-01-24 RX ORDER — BACITRACIN ZINC AND POLYMYXIN B SULFATE 500; 1000 [USP'U]/G; [USP'U]/G
OINTMENT TOPICAL ONCE
OUTPATIENT
Start: 2023-01-24 | End: 2023-01-24

## 2023-01-24 RX ORDER — GENTAMICIN SULFATE 1 MG/G
OINTMENT TOPICAL ONCE
OUTPATIENT
Start: 2023-01-24 | End: 2023-01-24

## 2023-01-24 RX ORDER — CLOBETASOL PROPIONATE 0.5 MG/G
OINTMENT TOPICAL ONCE
OUTPATIENT
Start: 2023-01-24 | End: 2023-01-24

## 2023-01-24 RX ORDER — LIDOCAINE 40 MG/G
CREAM TOPICAL ONCE
OUTPATIENT
Start: 2023-01-24 | End: 2023-01-24

## 2023-01-24 NOTE — PROGRESS NOTES
[facility]   Progress Note and Procedure Note      Mike Hastings  MEDICAL RECORD NUMBER:  [id number]  AGE: 67 y.o. GENDER: male  : 1950  EPISODE DATE:  2023    Subjective:     Chief Complaint   Patient presents with    Wound Check     Right leg         HISTORY of PRESENT ILLNESS HPI     Mike Hastings is a 67 y.o. male who presents today for wound/ulcer evaluation. History of Wound Context: Patient presents per referral of PCP for chronic cellulitis and edema. No open wounds noted. Patient has been on three different Oral abx and is currently taking Levaquin. He has had a total of 5 episodes of cellulitis. He is a DM II and Last A1c  was 7.1  Ultrasound was negative.   Wound/Ulcer Pain Timing/Severity: intermittent  Quality of pain: aching  Severity:  2 / 10   Modifying Factors: None  Associated Signs/Symptoms: edema    Ulcer Identification:  Ulcer Type: venous  Contributing Factors: edema, lymphedema, and diabetes    Wound: N/A        PAST MEDICAL HISTORY        Diagnosis Date    Arthritis     Bell's palsy     CAD (coronary artery disease)     no stents    DM w/ coma type I, uncontrolled 10/25/2019    ED (erectile dysfunction)     GERD (gastroesophageal reflux disease)     Herpes zoster 2000    Hyperactivity of bladder 1/10/2019    Hyperlipidemia     meds > 20 yrs    Hyperopia of both eyes with astigmatism and presbyopia 2017    Hypertension     meds > 20 yrs    Microalbuminuria due to type 1 diabetes mellitus ([address] 2019    Nuclear sclerotic cataract of right eye 2017    Obesity     Pancytopenia ([address]     Paroxysmal atrial fibrillation ([address] 2019    Polyneuropathy due to type 1 diabetes mellitus ([address] 2020    Type 2 diabetes mellitus with diabetic polyneuropathy, with long-term current use of insulin ([address] 2017    hx since  / Insulin dependent        PAST SURGICAL HISTORY    Past Surgical History:   Procedure Laterality Date    CARDIAC CATHETERIZATION  2007    CATARACT REMOVAL Left 12/18/2013    Phaco with IOL OS    CATARACT REMOVAL Right 12/17/2021    COLONOSCOPY  2004    DR GUADARRAMA    COLONOSCOPY  01/28/2016    diverticulosis, int hemorrhoids, 5y repeat (DR MANTILLA)    CYST REMOVAL Bilateral 1987    testicular cyst     JOINT REPLACEMENT  2013    LTKR    KNEE ARTHROSCOPY Right 12/2009    LAMINECTOMY N/A 3/27/2020    L 2-3, 3-4, 4-5 MICRODECOMPRESSION performed by Doc Holly [facility] at [facility]    scrotal    LUMBAR SPINE SURGERY Bilateral 03/27/2020    bilateral L2-L3, L3-L4, L4-L5 microdissection decompression (DR SANCHEZ)    TONSILLECTOMY  1954    TOTAL KNEE ARTHROPLASTY Left 2013    DR OKEEFE       FAMILY HISTORY    Family History   Problem Relation Age of Onset    Diabetes Mother     High Blood Pressure Mother     High Cholesterol Mother     Heart Disease Mother     Other Mother         recurrent UTI    Cancer Father         esophagus (smoker)    No Known Problems Sister     Diabetes Brother     High Cholesterol Brother     Macular Degen Brother     Heart Attack Maternal Grandfather 76    Diabetes Paternal Grandmother     Heart Attack Paternal Grandfather 71    Thyroid Disease Daughter     Other Daughter         GI & sinus issues    No Known Problems Daughter        SOCIAL HISTORY    Social History     Tobacco Use    Smoking status: Never    Smokeless tobacco: Never   Vaping Use    Vaping Use: Never used   Substance Use Topics    Alcohol use:  Yes     Alcohol/week: 1.0 - 2.0 standard drink     Types: 1 - 2 Glasses of wine per week     Comment: social    Drug use: No       ALLERGIES    Allergies   Allergen Reactions    Atorvastatin Other (See Comments)     Muscle ache    Shellfish Allergy Itching, Rash and Swelling    Simvastatin Other (See Comments)     cough    Bee Pollen     Iodine     Seasonal      Dandelions / maple blossoms cause sinus sx    Shellfish-Derived Products     Pcn [Penicillins] Hives and Rash     Caused eczema as child       MEDICATIONS    Current Outpatient Medications on File Prior to Encounter   Medication Sig Dispense Refill    levoFLOXacin (LEVAQUIN) 750 MG tablet Take 1 tablet by mouth daily for 10 days 10 tablet 0    NOVOLOG 100 UNIT/ML injection vial INJECT 25 UNITS SUBCUTANEOUSLY THREE TIMES DAILY BEFORE MEAL(S), SLIDING SCALE  IN THE MORNING AND AT BEDTIME 30 mL 3    [START ON 1/27/2023] pregabalin (LYRICA) 150 MG capsule Take 1 capsule by mouth 3 times daily for 90 days.  270 capsule 0    diclofenac (VOLTAREN) 50 MG EC tablet Take 1 tablet by mouth 3 times daily 90 tablet 2    insulin NPH (NOVOLIN N) 100 UNIT/ML injection vial 30 units before breakfast, 40 units with supper, 15 units at bed time 8 each 3    NOVOLIN R 100 UNIT/ML injection INJECT SUBCUTANEOUSLY THREE TIMES DAILY UP  UNITS  AS NEEDED 9 mL 1    metFORMIN (GLUCOPHAGE) 1000 MG tablet Take 1 tablet by mouth 2 times daily (with meals) 180 tablet 0    blood glucose test strips (ONETOUCH VERIO) strip USE 1 STRIP TO CHECK GLUCOSE FIVE TIMES DAILY AS INSTRUCTED 200 each 11    DULoxetine (CYMBALTA) 60 MG extended release capsule Take 1 capsule by mouth daily 90 capsule 3    Continuous Blood Gluc Sensor (FREESTYLE NUBIA 14 DAY SENSOR) MISC Inject 1 Device as directed every 14 days Use as directedUse as directed 14 each 2    mirabegron (MYRBETRIQ) 50 MG TB24 Take 50 mg by mouth daily      Continuous Blood Gluc  (FREESTYLE NUBIA 14 DAY READER) XU Please give 1 reader 14 each 5    irbesartan (AVAPRO) 300 MG tablet Take 1 tablet by mouth once daily 90 tablet 0    tadalafil (CIALIS) 5 MG tablet Take 1 tablet by mouth daily 90 tablet 3    clindamycin (CLEOCIN) 300 MG capsule TAKE TWO CAPSULES BY MOUTH ONE HOUR BEFORE APPOINTMENT      acetaminophen (TYLENOL) 500 MG tablet Take 1,000 mg by mouth every 6 hours as needed for Pain      polyethylene glycol (GLYCOLAX) powder Take 17 g by mouth daily      rosuvastatin (CRESTOR) 20 MG tablet Take 20 mg by mouth every evening       ezetimibe (ZETIA) 10 MG tablet Take 10 mg by mouth daily Indications: takes 1-2 times per week       Lancets MISC Test five times daily. 200 each 5    aspirin 81 MG tablet Take 81 mg by mouth daily      Insulin Syringes, Disposable, U-100 1 ML MISC Use qid for dosing insulin as directed. Dx 250.00 400 each 3    ULTICARE INSULIN SYRINGE 30G X 1/2\" 1 ML MISC USE 4 TIMES DAILY FOR DOSING INSULIN AS DIRECTED 300 each 3    Docusate Sodium (COLACE PO) Take 400 mg by mouth every evening       metoprolol succinate (TOPROL XL) 50 MG extended release tablet Take 50 mg by mouth nightly        No current facility-administered medications on file prior to encounter. REVIEW OF SYSTEMS    Pertinent items are noted in HPI. Objective:      BP (!) 154/73   Pulse 90   Temp 97.5 °F (36.4 °C) (Temporal)   Resp 16     Wt Readings from Last 3 Encounters:   01/19/23 253 lb (114.8 kg)   01/09/23 256 lb 12.8 oz (116.5 kg)   12/05/22 260 lb (117.9 kg)       PHYSICAL EXAM    Constitutional:   Well nourished and well developed. Appears neat and clean. Patient is alert, oriented x3, and in no apparent distress. Respiratory:  Respiratory effort is easy and symmetric bilaterally. Rate is normal at rest and on room air. Vascular:  Pedal Pulses is palpable and audible signal noted with doppler. Capillary refill is <5 sec to digits bilateral.  Extremities negative for pitting edema. Neurological:  Gross and Light touch intact. Protective sensation intact    Dermatological:  Wound description noted in wound assessment. No open wounds noted. Patient does have peeling noted in moccasin distribution and dry xerotic heels consistent with tinea    Psychiatric:  Judgement and insight intact. Short and long term memory intact. No evidence of depression, anxiety, or agitation. Patient is calm, cooperative, and communicative. Appropriate interactions and affect.       Assessment:      Active Hospital Problems    Diagnosis Date Noted    Tinea pedis [B35.3] 01/24/2023     Priority: Medium    Type 1 diabetes mellitus with diabetic neuropathy, unspecified [E10.40] 07/29/2022     Priority: Medium    Bilateral lower extremity edema [R60.0] 01/29/2021    Cellulitis of right lower extremity [L03.115] 01/22/2021    Polyneuropathy due to type 1 diabetes mellitus ([facility] [E10.42] 07/07/2020    Polyneuropathy associated with underlying disease ([facility] Mel Cm 06/17/2020        Procedure Note  Indications:  Based on my examination of this patient's wound(s)/ulcer(s) today, debridement is not required to promote healing and evaluate the wound base. Incision 03/27/20 Back Lower;Medial (Active)   Number of days: 1033             Plan:   Patient examined and explained to the patient that this is most likely a form of lyphedema. Rx Loprox  cream  Patient to wear his own compression  SIgns of infection explained  Chart reviewed in detail and ultrasound  Follow up in 2 weeks for evaluation and possible referral to [facility]. Treatment Note please see attached Discharge Instructions    Written patient dismissal instructions given to patient and signed by patient or POA. Discharge [facility] and Hyperbaric Medicine   Physician Orders and Discharge Instructions  [address]  Telephone: [phone]      NAME:  Sander Wei OF BIRTH:  1950  MEDICAL RECORD NUMBER:  [id number]    Your  is:  [address] [place] [address] Care/Facility:   none    Wound Location:   nothing open     Dressing orders:    Compression:    Apply 5-10mmHg compression hose to  LEFT lower leg(s), may remove at bedtime, reapply first thing in the morning, avoid prolonged standing, elevate legs when sitting. May use own compression stocking if desired.     Offloading Device:    Other Instructions:   pick up the antifungal cream and apply to feet as directed, finish your antibiotics. Keep all dressings clean, dry and intact. Keep pressure off the wound(s) at all times. Follow up visit   2 Weeks    February 7, 2023  at  9:30am    Please give 24 hour notice if unable to keep appointment. [phone]    If you experience any of the following, please call the Wound Care Service at  [phone] or go to the nearest emergency room. *Increase in pain *Temperature over 101 *Increase in drainage from your wound or a foul odor  *Uncontrolled swelling *Need for compression bandage changes due to slippage, breakthrough drainage       PLEASE NOTE: IF YOU ARE UNABLE TO OBTAIN WOUND SUPPLIES, CONTINUE TO USE THE SUPPLIES YOU HAVE AVAILABLE UNTIL YOU ARE ABLE TO REACH US.  IT IS MOST IMPORTANT TO KEEP THE WOUND COVERED AT ALL TIMES  Electronically signed by Mavis Marie DPM on 1/24/2023 at 2:30 PM                      Electronically signed by Mavis Marie DPM on 1/24/2023 at 2:32 PM

## 2023-01-24 NOTE — DISCHARGE INSTRUCTIONS
[facility]   Physician Orders and Discharge Instructions  [address]  Telephone: [phone]      NAME:  Carla Gonzales          YOB: 1950  MEDICAL RECORD NUMBER:  [id number]    Your  is:  Liudmila Wilkerson Amadeo [facility] Care/Facility:   none    Wound Location:   nothing open     Dressing orders:    Compression:    Apply 5-10mmHg compression hose to  LEFT lower leg(s), may remove at bedtime, reapply first thing in the morning, avoid prolonged standing, elevate legs when sitting. May use own compression stocking if desired. Offloading Device:    Other Instructions:    the antifungal cream and apply to feet as directed, finish your antibiotics. Keep all dressings clean, dry and intact. Keep pressure off the wound(s) at all times. Follow up visit   2 Weeks    February 7, 2023  at  9:30am    Please give 24 hour notice if unable to keep appointment. [phone]    If you experience any of the following, please call the Wound Care Service at  [phone] or go to the nearest emergency room. *Increase in pain *Temperature over 101 *Increase in drainage from your wound or a foul odor  *Uncontrolled swelling *Need for compression bandage changes due to slippage, breakthrough drainage       PLEASE NOTE: IF YOU ARE UNABLE TO OBTAIN WOUND SUPPLIES, CONTINUE TO USE THE SUPPLIES YOU HAVE AVAILABLE UNTIL YOU ARE ABLE TO REACH US.  IT IS MOST IMPORTANT TO KEEP THE WOUND COVERED AT ALL TIMES  Electronically signed by Elizabeth Martinez DPM on 1/24/2023 at 2:30 PM

## 2023-01-24 NOTE — CODE DOCUMENTATION
[address] [facility] Physician Billing Sheet. Ibis Charles  AGE: 67 y.o.    GENDER: male  : 1950  TODAY'S DATE:  2023    ICD-10 CODES  Active Hospital Problems    Diagnosis Date Noted    Tinea pedis [B35.3] 2023     Priority: Medium    Type 1 diabetes mellitus with diabetic neuropathy, unspecified [E10.40] 2022     Priority: Medium    Bilateral lower extremity edema [R60.0] 2021    Cellulitis of right lower extremity [L03.115] 2021    Polyneuropathy due to type 1 diabetes mellitus ([facility] [E10.42] 2020    Polyneuropathy associated with underlying disease ([facility] Kelli Tovar 2020       PHYSICIAN PROCEDURES    CPT CODE  26855      Electronically signed by Estrella Mcgowan DPM on 2023 at 2:41 PM

## 2023-01-30 DIAGNOSIS — E10.9 TYPE 1 DIABETES MELLITUS ON INSULIN THERAPY (HCC): Primary | ICD-10-CM

## 2023-01-30 DIAGNOSIS — R97.20 ELEVATED PSA: ICD-10-CM

## 2023-02-01 LAB
ANION GAP SERPL CALCULATED.3IONS-SCNC: 13 MMOL/L (ref 10–20)
B-TYPE NATRIURETIC PEPTIDE: 80 PG/ML (ref 0–99)
BICARBONATE: 26 MMOL/L (ref 21–32)
CALCIUM SERPL-MCNC: 9.1 MG/DL (ref 8.6–10.3)
CHLORIDE BLD-SCNC: 104 MMOL/L (ref 98–107)
CREAT SERPL-MCNC: 1.15 MG/DL (ref 0.5–1.3)
EGFR MALE: 67 ML/MIN/1.73M2
GLUCOSE: 122 MG/DL (ref 74–99)
POTASSIUM SERPL-SCNC: 5.1 MMOL/L (ref 3.5–5.3)
SODIUM BLD-SCNC: 138 MMOL/L (ref 136–145)
UREA NITROGEN: 30 MG/DL (ref 6–23)

## 2023-02-07 ENCOUNTER — HOSPITAL ENCOUNTER (OUTPATIENT)
Dept: WOUND CARE | Age: 73
Discharge: HOME | End: 2023-02-07
Payer: MEDICARE

## 2023-02-07 VITALS
TEMPERATURE: 96.6 F | SYSTOLIC BLOOD PRESSURE: 141 MMHG | RESPIRATION RATE: 16 BRPM | HEART RATE: 58 BPM | DIASTOLIC BLOOD PRESSURE: 70 MMHG

## 2023-02-07 DIAGNOSIS — L03.115 CELLULITIS OF RIGHT LOWER EXTREMITY: ICD-10-CM

## 2023-02-07 DIAGNOSIS — E10.40 TYPE 1 DIABETES MELLITUS WITH DIABETIC NEUROPATHY, UNSPECIFIED (HCC): ICD-10-CM

## 2023-02-07 DIAGNOSIS — B35.3 TINEA PEDIS OF RIGHT FOOT: Primary | ICD-10-CM

## 2023-02-07 PROCEDURE — 99212 OFFICE O/P EST SF 10 MIN: CPT

## 2023-02-07 RX ORDER — LIDOCAINE HYDROCHLORIDE 40 MG/ML
SOLUTION TOPICAL ONCE
Status: CANCELLED | OUTPATIENT
Start: 2023-02-07 | End: 2023-02-07

## 2023-02-07 RX ORDER — LIDOCAINE HYDROCHLORIDE 20 MG/ML
JELLY TOPICAL ONCE
Status: CANCELLED | OUTPATIENT
Start: 2023-02-07 | End: 2023-02-07

## 2023-02-07 RX ORDER — GINSENG 100 MG
CAPSULE ORAL ONCE
Status: CANCELLED | OUTPATIENT
Start: 2023-02-07 | End: 2023-02-07

## 2023-02-07 RX ORDER — CLOBETASOL PROPIONATE 0.5 MG/G
OINTMENT TOPICAL ONCE
Status: CANCELLED | OUTPATIENT
Start: 2023-02-07 | End: 2023-02-07

## 2023-02-07 RX ORDER — BACITRACIN, NEOMYCIN, POLYMYXIN B 400; 3.5; 5 [USP'U]/G; MG/G; [USP'U]/G
OINTMENT TOPICAL ONCE
Status: CANCELLED | OUTPATIENT
Start: 2023-02-07 | End: 2023-02-07

## 2023-02-07 RX ORDER — BACITRACIN ZINC AND POLYMYXIN B SULFATE 500; 1000 [USP'U]/G; [USP'U]/G
OINTMENT TOPICAL ONCE
Status: CANCELLED | OUTPATIENT
Start: 2023-02-07 | End: 2023-02-07

## 2023-02-07 RX ORDER — BETAMETHASONE DIPROPIONATE 0.05 %
OINTMENT (GRAM) TOPICAL ONCE
Status: CANCELLED | OUTPATIENT
Start: 2023-02-07 | End: 2023-02-07

## 2023-02-07 RX ORDER — LIDOCAINE 40 MG/G
CREAM TOPICAL ONCE
Status: CANCELLED | OUTPATIENT
Start: 2023-02-07 | End: 2023-02-07

## 2023-02-07 RX ORDER — GENTAMICIN SULFATE 1 MG/G
OINTMENT TOPICAL ONCE
Status: CANCELLED | OUTPATIENT
Start: 2023-02-07 | End: 2023-02-07

## 2023-02-07 RX ORDER — LIDOCAINE 50 MG/G
OINTMENT TOPICAL ONCE
Status: CANCELLED | OUTPATIENT
Start: 2023-02-07 | End: 2023-02-07

## 2023-02-07 NOTE — PROGRESS NOTES
[address]   Progress Note and Procedure Note      Levon Morin  MEDICAL RECORD NUMBER:  [id number]  AGE: 75 y.o. GENDER: male  : 1950  EPISODE DATE:  2023    Subjective:     Chief Complaint   Patient presents with    Wound Check     Right leg         HISTORY of PRESENT ILLNESS HPI     Levon Morin is a 75 y.o. male who presents today for wound/ulcer evaluation. History of Wound Context: Patient presents per referral of PCP for chronic cellulitis and edema. No open wounds noted. Patient has been on three different Oral abx and is currently taking Levaquin. He has had a total of 5 episodes of cellulitis. He is a DM II and Last A1c  was 7.1  Ultrasound was negative.   Wound/Ulcer Pain Timing/Severity: intermittent  Quality of pain: aching  Severity:  2  10   Modifying Factors: None  Associated Signs/Symptoms: edema    Ulcer Identification:  Ulcer Type: venous  Contributing Factors: edema, lymphedema, and diabetes    Wound: N/A        PAST MEDICAL HISTORY        Diagnosis Date    Arthritis     Bell's palsy     CAD (coronary artery disease)     no stents    DM w/ coma type I, uncontrolled 10/25/2019    ED (erectile dysfunction)     GERD (gastroesophageal reflux disease)     Herpes zoster 2000    Hyperactivity of bladder 1/10/2019    Hyperlipidemia     meds > 20 yrs    Hyperopia of both eyes with astigmatism and presbyopia 2017    Hypertension     meds > 20 yrs    Microalbuminuria due to type 1 diabetes mellitus ([address] 2019    Nuclear sclerotic cataract of right eye 2017    Obesity     Pancytopenia ([address]     Paroxysmal atrial fibrillation ([address] 2019    Polyneuropathy due to type 1 diabetes mellitus ([address] 2020    Type 2 diabetes mellitus with diabetic polyneuropathy, with long-term current use of insulin ([address] 2017    hx since  / Insulin dependent        PAST SURGICAL HISTORY    Past Surgical History:   Procedure Laterality Date    CARDIAC CATHETERIZATION  2007    CATARACT REMOVAL Left 12/18/2013    Phaco with IOL OS    CATARACT REMOVAL Right 12/17/2021    COLONOSCOPY  2004    DR GUADARRAMA    COLONOSCOPY  01/28/2016    diverticulosis, int hemorrhoids, 5y repeat (DR MANTILLA)    CYST REMOVAL Bilateral 1987    testicular cyst     JOINT REPLACEMENT  2013    LTKR    KNEE ARTHROSCOPY Right 12/2009    LAMINECTOMY N/A 3/27/2020    L 2-3, 3-4, 4-5 MICRODECOMPRESSION performed by Merlin Ann MD at [facility]    scrotal    LUMBAR SPINE SURGERY Bilateral 03/27/2020    bilateral L2-L3, L3-L4, L4-L5 microdissection decompression (DR SANCHEZ)    TONSILLECTOMY  1954    TOTAL KNEE ARTHROPLASTY Left 2013    DR OKEEFE       FAMILY HISTORY    Family History   Problem Relation Age of Onset    Diabetes Mother     High Blood Pressure Mother     High Cholesterol Mother     Heart Disease Mother     Other Mother         recurrent UTI    Cancer Father         esophagus (smoker)    No Known Problems Sister     Diabetes Brother     High Cholesterol Brother     Macular Degen Brother     Heart Attack Maternal Grandfather 76    Diabetes Paternal Grandmother     Heart Attack Paternal Grandfather 71    Thyroid Disease Daughter     Other Daughter         GI & sinus issues    No Known Problems Daughter        SOCIAL HISTORY    Social History     Tobacco Use    Smoking status: Never    Smokeless tobacco: Never   Vaping Use    Vaping Use: Never used   Substance Use Topics    Alcohol use:  Yes     Alcohol/week: 1.0 - 2.0 standard drink     Types: 1 - 2 Glasses of wine per week     Comment: social    Drug use: No       ALLERGIES    Allergies   Allergen Reactions    Atorvastatin Other (See Comments)     Muscle ache    Shellfish Allergy Itching, Rash and Swelling    Simvastatin Other (See Comments)     cough    Bee Pollen     Iodine     Seasonal      Dandelions / maple blossoms cause sinus sx    Shellfish-Derived Products     Pcn [Penicillins] Hives and Rash     Caused eczema as child       MEDICATIONS    Current Outpatient Medications on File Prior to Encounter   Medication Sig Dispense Refill    ciclopirox (LOPROX) 0.77 % cream Apply topically 2 times daily. 30 g 2    NOVOLOG 100 UNIT/ML injection vial INJECT 25 UNITS SUBCUTANEOUSLY THREE TIMES DAILY BEFORE MEAL(S), SLIDING SCALE  IN THE MORNING AND AT BEDTIME 30 mL 3    pregabalin (LYRICA) 150 MG capsule Take 1 capsule by mouth 3 times daily for 90 days.  270 capsule 0    diclofenac (VOLTAREN) 50 MG EC tablet Take 1 tablet by mouth 3 times daily 90 tablet 2    insulin NPH (NOVOLIN N) 100 UNIT/ML injection vial 30 units before breakfast, 40 units with supper, 15 units at bed time 8 each 3    NOVOLIN R 100 UNIT/ML injection INJECT SUBCUTANEOUSLY THREE TIMES DAILY UP  UNITS  AS NEEDED 9 mL 1    metFORMIN (GLUCOPHAGE) 1000 MG tablet Take 1 tablet by mouth 2 times daily (with meals) 180 tablet 0    blood glucose test strips (ONETOUCH VERIO) strip USE 1 STRIP TO CHECK GLUCOSE FIVE TIMES DAILY AS INSTRUCTED 200 each 11    DULoxetine (CYMBALTA) 60 MG extended release capsule Take 1 capsule by mouth daily 90 capsule 3    Continuous Blood Gluc Sensor (FREESTYLE NUBIA 14 DAY SENSOR) MISC Inject 1 Device as directed every 14 days Use as directedUse as directed 14 each 2    mirabegron (MYRBETRIQ) 50 MG TB24 Take 50 mg by mouth daily      Continuous Blood Gluc  (FREESTYLE NUBIA 14 DAY READER) XU Please give 1 reader 14 each 5    irbesartan (AVAPRO) 300 MG tablet Take 1 tablet by mouth once daily 90 tablet 0    tadalafil (CIALIS) 5 MG tablet Take 1 tablet by mouth daily 90 tablet 3    clindamycin (CLEOCIN) 300 MG capsule TAKE TWO CAPSULES BY MOUTH ONE HOUR BEFORE APPOINTMENT      acetaminophen (TYLENOL) 500 MG tablet Take 1,000 mg by mouth every 6 hours as needed for Pain      polyethylene glycol (GLYCOLAX) powder Take 17 g by mouth daily      rosuvastatin (CRESTOR) 20 MG tablet Take 20 mg by mouth every evening       ezetimibe (ZETIA) 10 MG tablet Take 10 mg by mouth daily Indications: takes 1-2 times per week       Lancets MISC Test five times daily. 200 each 5    aspirin 81 MG tablet Take 81 mg by mouth daily      Insulin Syringes, Disposable, U-100 1 ML MISC Use qid for dosing insulin as directed. Dx 250.00 400 each 3    ULTICARE INSULIN SYRINGE 30G X 1/2\" 1 ML MISC USE 4 TIMES DAILY FOR DOSING INSULIN AS DIRECTED 300 each 3    Docusate Sodium (COLACE PO) Take 400 mg by mouth every evening       metoprolol succinate (TOPROL XL) 50 MG extended release tablet Take 50 mg by mouth nightly        No current facility-administered medications on file prior to encounter. REVIEW OF SYSTEMS    Pertinent items are noted in HPI. Objective:      BP (!) 141/70   Pulse 58   Temp (!) 96.6 °F (35.9 °C) (Temporal)   Resp 16     Wt Readings from Last 3 Encounters:   01/19/23 253 lb (114.8 kg)   01/09/23 256 lb 12.8 oz (116.5 kg)   12/05/22 260 lb (117.9 kg)       PHYSICAL EXAM    Constitutional:   Well nourished and well developed. Appears neat and clean. Patient is alert, oriented x3, and in no apparent distress. Respiratory:  Respiratory effort is easy and symmetric bilaterally. Rate is normal at rest and on room air. Vascular:  Pedal Pulses is palpable and audible signal noted with doppler. Capillary refill is <5 sec to digits bilateral.  Extremities negative for pitting edema. Neurological:  Gross and Light touch intact. Protective sensation intact    Dermatological:  Wound description noted in wound assessment. No open wounds noted. Patient does have peeling noted in moccasin distribution and dry xerotic heels consistent with tinea. Slight maceration between toes. Psychiatric:  Judgement and insight intact. Short and long term memory intact. No evidence of depression, anxiety, or agitation. Patient is calm, cooperative, and communicative. Appropriate interactions and affect.       Assessment:      Active Hospital Problems    Diagnosis Date Noted    Tinea pedis [B35.3] 01/24/2023     Priority: Medium    Type 1 diabetes mellitus with diabetic neuropathy, unspecified [E10.40] 07/29/2022     Priority: Medium    Polyneuropathy due to type 1 diabetes mellitus ([facility] [E10.42] 07/07/2020        Procedure Note  Indications:  Based on my examination of this patient's wound(s)/ulcer(s) today, debridement is not required to promote healing and evaluate the wound base. Incision 03/27/20 Back Lower;Medial (Active)   Number of days: 1033             Plan:   Patient examined and explained to the patient that this is most likely a form of lyphedema. Rx Loprox  cream twice daily  Patient to wear his own compression  SIgns of infection explained and report immediately  Keep legs hydrated with lotion  If any open wounds call wound center for an appointment  Follow up with Dr. Sukh Kirkpatrick for routime DM foot care        Treatment Note please see attached Discharge Instructions    Written patient dismissal instructions given to patient and signed by patient or POA. Discharge Instructions         Wound Clinic Physician Orders and Discharge [address] [facility] [address]   [facility], [place], [address] [facility]  Telephone: [phone]    NAME:  Christianne Morales OF BIRTH:  1950  MEDICAL RECORD NUMBER:  [id number]  DATE:  [de-identified]    Congratulations!! You have completed your treatment. 1. Return to your Primary Care Physician for all your health issues. 2. Resume your ordinary activities as tolerated. 3. Take your medications as prescribed by your primary care physician. 4. Check your skin daily for cracks, bruises, sores, or dryness. Use a moisturizer as needed. 5. Clean and dry your skin, using mild soap and warm water (not hot). 6. Avoid alcohol and caffeine and do not smoke. 7. Maintain a nutritious diet. 8. Avoid pressure on your wound site.  Keep your legs elevated above the level of the heart whenever possible. 9. Continue to use wraps/stockings/compression as prescribed. 10. Replace compression stockings every four to six months as needed to ensure proper fit. 11. Wear well-fitting shoes and leg garments. 12.  CONTINUE TO USE THE LOPROX CREAM DAILY TO YOUR FEET. 13.  FOLLOW UP WITH A PODIATRIST FOR REGULAR FOOT CARE/NAIL TRIM. 14.  APPLY A THICK LOTION/CREAM TO YOUR LEGS DAILY. 15. CONTINUE TO WEAR YOUR COMPRESSION SOCKS. THANK YOU FOR ALLOWING US TO SERVE YOU.  PLEASE CALL IF YOU DEVELOP ANOTHER WOUND. [phone]               Electronically signed by Malini Whittaker DPM on 2/7/2023 at 10:28 AM

## 2023-02-07 NOTE — CODE DOCUMENTATION
[address] [facility] Physician Billing Sheet. Caesar Bronwyn Li  AGE: 75 y.o.    GENDER: male  : 1950  TODAY'S DATE:  2023    ICD-10 CODES  Active Hospital Problems    Diagnosis Date Noted    Tinea pedis [B35.3] 2023     Priority: Medium    Type 1 diabetes mellitus with diabetic neuropathy, unspecified [E10.40] 2022     Priority: Medium    Polyneuropathy due to type 1 diabetes mellitus ([facility] [E10.42] 2020       PHYSICIAN PROCEDURES    CPT CODE  57707      Electronically signed by Edgar Johnson DPM on 2023 at 10:27 AM

## 2023-02-07 NOTE — DISCHARGE INSTRUCTIONS
Wound Clinic Physician Orders and Discharge [address] [facility]  [address]   [place], [address] [facility]  Telephone: [phone]    NAME:  Lizzy Tariq OF BIRTH:  1950  MEDICAL RECORD NUMBER:  [id number]  DATE:  [de-identified]    Congratulations!! You have completed your treatment. 1. Return to your Primary Care Physician for all your health issues. 2. Resume your ordinary activities as tolerated. 3. Take your medications as prescribed by your primary care physician. 4. Check your skin daily for cracks, bruises, sores, or dryness. Use a moisturizer as needed. 5. Clean and dry your skin, using mild soap and warm water (not hot). 6. Avoid alcohol and caffeine and do not smoke. 7. Maintain a nutritious diet. 8. Avoid pressure on your wound site. Keep your legs elevated above the level of the heart whenever possible. 9. Continue to use wraps/stockings/compression as prescribed. 10. Replace compression stockings every four to six months as needed to ensure proper fit. 11. Wear well-fitting shoes and leg garments. 12.  CONTINUE TO USE THE LOPROX CREAM DAILY TO YOUR FEET. 13.  FOLLOW UP WITH A PODIATRIST FOR REGULAR FOOT CARE/NAIL TRIM. 14.  APPLY A THICK LOTION/CREAM TO YOUR LEGS DAILY. 15. CONTINUE TO WEAR YOUR COMPRESSION SOCKS. THANK YOU FOR ALLOWING US TO SERVE YOU.  PLEASE CALL IF YOU DEVELOP ANOTHER WOUND. [phone]

## 2023-03-06 ENCOUNTER — HOSPITAL ENCOUNTER (OUTPATIENT)
Dept: LAB | Age: 73
Discharge: HOME | End: 2023-03-06
Payer: MEDICARE

## 2023-03-06 DIAGNOSIS — R97.20 ELEVATED PSA: ICD-10-CM

## 2023-03-06 DIAGNOSIS — E87.5 HYPERKALEMIA: Primary | ICD-10-CM

## 2023-03-06 DIAGNOSIS — E10.9 TYPE 1 DIABETES MELLITUS ON INSULIN THERAPY (HCC): ICD-10-CM

## 2023-03-06 LAB
ALBUMIN SERPL-MCNC: 3.8 G/DL (ref 3.5–4.6)
ALP BLD-CCNC: 77 U/L (ref 35–104)
ALT SERPL-CCNC: 18 U/L (ref 0–41)
ANION GAP SERPL CALCULATED.3IONS-SCNC: 8 MEQ/L (ref 9–15)
AST SERPL-CCNC: 21 U/L (ref 0–40)
BASOPHILS ABSOLUTE: 0.1 K/UL (ref 0–0.2)
BASOPHILS RELATIVE PERCENT: 1.5 %
BILIRUB SERPL-MCNC: 0.3 MG/DL (ref 0.2–0.7)
BUN BLDV-MCNC: 22 MG/DL (ref 8–23)
CALCIUM SERPL-MCNC: 9.1 MG/DL (ref 8.5–9.9)
CHLORIDE BLD-SCNC: 104 MEQ/L (ref 95–107)
CO2: 25 MEQ/L (ref 20–31)
CREAT SERPL-MCNC: 1.13 MG/DL (ref 0.7–1.2)
EOSINOPHILS ABSOLUTE: 0.2 K/UL (ref 0–0.7)
EOSINOPHILS RELATIVE PERCENT: 4.9 %
GFR SERPL CREATININE-BSD FRML MDRD: >60 ML/MIN/{1.73_M2}
GLOBULIN: 2.7 G/DL (ref 2.3–3.5)
GLUCOSE BLD-MCNC: 99 MG/DL (ref 70–99)
HCT VFR BLD CALC: 39.5 % (ref 42–52)
HEMOGLOBIN: 13 G/DL (ref 14–18)
LYMPHOCYTES ABSOLUTE: 1.2 K/UL (ref 1–4.8)
LYMPHOCYTES RELATIVE PERCENT: 23.9 %
MCH RBC QN AUTO: 31.2 PG (ref 27–31.3)
MCHC RBC AUTO-ENTMCNC: 32.9 % (ref 33–37)
MCV RBC AUTO: 94.9 FL (ref 79–92.2)
MONOCYTES ABSOLUTE: 0.5 K/UL (ref 0.2–0.8)
MONOCYTES RELATIVE PERCENT: 10.7 %
NEUTROPHILS ABSOLUTE: 2.9 K/UL (ref 1.4–6.5)
NEUTROPHILS RELATIVE PERCENT: 59 %
PDW BLD-RTO: 14.3 % (ref 11.5–14.5)
PLATELET # BLD: 181 K/UL (ref 130–400)
POTASSIUM SERPL-SCNC: 5.7 MEQ/L (ref 3.4–4.9)
PROSTATE SPECIFIC ANTIGEN: 3.58 NG/ML (ref 0–4)
RBC # BLD: 4.16 M/UL (ref 4.7–6.1)
SODIUM BLD-SCNC: 137 MEQ/L (ref 135–144)
TOTAL PROTEIN: 6.5 G/DL (ref 6.3–8)
WBC # BLD: 5 K/UL (ref 4.8–10.8)

## 2023-03-06 PROCEDURE — 80053 COMPREHEN METABOLIC PANEL: CPT

## 2023-03-06 PROCEDURE — 36415 COLL VENOUS BLD VENIPUNCTURE: CPT

## 2023-03-06 PROCEDURE — 85025 COMPLETE CBC W/AUTO DIFF WBC: CPT

## 2023-03-06 PROCEDURE — 84153 ASSAY OF PSA TOTAL: CPT

## 2023-03-07 ENCOUNTER — TELEPHONE (OUTPATIENT)
Dept: FAMILY MEDICINE CLINIC | Age: 73
End: 2023-03-07

## 2023-03-08 SDOH — HEALTH STABILITY: PHYSICAL HEALTH: ON AVERAGE, HOW MANY DAYS PER WEEK DO YOU ENGAGE IN MODERATE TO STRENUOUS EXERCISE (LIKE A BRISK WALK)?: 0 DAYS

## 2023-03-08 SDOH — HEALTH STABILITY: PHYSICAL HEALTH: ON AVERAGE, HOW MANY MINUTES DO YOU ENGAGE IN EXERCISE AT THIS LEVEL?: 0 MIN

## 2023-03-09 ENCOUNTER — HOSPITAL ENCOUNTER (OUTPATIENT)
Dept: LAB | Age: 73
Discharge: HOME | End: 2023-03-09
Payer: MEDICARE

## 2023-03-09 ENCOUNTER — OFFICE VISIT (OUTPATIENT)
Dept: FAMILY MEDICINE CLINIC | Age: 73
End: 2023-03-09

## 2023-03-09 VITALS
HEART RATE: 78 BPM | DIASTOLIC BLOOD PRESSURE: 62 MMHG | TEMPERATURE: 97.7 F | BODY MASS INDEX: 34.65 KG/M2 | OXYGEN SATURATION: 96 % | HEIGHT: 72 IN | WEIGHT: 255.8 LBS | SYSTOLIC BLOOD PRESSURE: 114 MMHG

## 2023-03-09 DIAGNOSIS — I48.0 PAROXYSMAL ATRIAL FIBRILLATION (HCC): Chronic | ICD-10-CM

## 2023-03-09 DIAGNOSIS — E10.40 TYPE 1 DIABETES MELLITUS WITH DIABETIC NEUROPATHY, UNSPECIFIED (HCC): ICD-10-CM

## 2023-03-09 DIAGNOSIS — R80.9 MICROALBUMINURIA DUE TO TYPE 1 DIABETES MELLITUS (HCC): ICD-10-CM

## 2023-03-09 DIAGNOSIS — E10.42 POLYNEUROPATHY DUE TO TYPE 1 DIABETES MELLITUS (HCC): ICD-10-CM

## 2023-03-09 DIAGNOSIS — E66.01 SEVERE OBESITY (BMI 35.0-39.9) WITH COMORBIDITY (HCC): ICD-10-CM

## 2023-03-09 DIAGNOSIS — E87.5 HYPERKALEMIA: ICD-10-CM

## 2023-03-09 DIAGNOSIS — E10.9 TYPE 1 DIABETES MELLITUS ON INSULIN THERAPY (HCC): Primary | ICD-10-CM

## 2023-03-09 DIAGNOSIS — E10.29 MICROALBUMINURIA DUE TO TYPE 1 DIABETES MELLITUS (HCC): ICD-10-CM

## 2023-03-09 LAB — POTASSIUM SERPL-SCNC: 5.3 MEQ/L (ref 3.4–4.9)

## 2023-03-09 PROCEDURE — 84132 ASSAY OF SERUM POTASSIUM: CPT

## 2023-03-09 PROCEDURE — 36415 COLL VENOUS BLD VENIPUNCTURE: CPT

## 2023-03-09 RX ORDER — PREGABALIN 150 MG/1
150 CAPSULE ORAL 3 TIMES DAILY
Qty: 270 CAPSULE | Refills: 0 | Status: SHIPPED | OUTPATIENT
Start: 2023-03-09 | End: 2023-06-07

## 2023-03-09 RX ORDER — MAGNESIUM OXIDE 400 MG/1
TABLET ORAL
COMMUNITY
Start: 2023-02-01

## 2023-03-09 RX ORDER — HYDROCHLOROTHIAZIDE 12.5 MG/1
CAPSULE, GELATIN COATED ORAL
COMMUNITY
Start: 2023-02-01

## 2023-03-09 RX ORDER — DULOXETIN HYDROCHLORIDE 60 MG/1
60 CAPSULE, DELAYED RELEASE ORAL DAILY
Qty: 90 CAPSULE | Refills: 3 | Status: SHIPPED | OUTPATIENT
Start: 2023-03-09

## 2023-03-09 RX ORDER — ICOSAPENT ETHYL 1000 MG/1
CAPSULE ORAL
COMMUNITY
Start: 2023-02-01 | End: 2023-03-09

## 2023-03-09 SDOH — ECONOMIC STABILITY: FOOD INSECURITY: WITHIN THE PAST 12 MONTHS, YOU WORRIED THAT YOUR FOOD WOULD RUN OUT BEFORE YOU GOT MONEY TO BUY MORE.: NEVER TRUE

## 2023-03-09 SDOH — ECONOMIC STABILITY: HOUSING INSECURITY
IN THE LAST 12 MONTHS, WAS THERE A TIME WHEN YOU DID NOT HAVE A STEADY PLACE TO SLEEP OR SLEPT IN A SHELTER (INCLUDING NOW)?: NO

## 2023-03-09 SDOH — ECONOMIC STABILITY: INCOME INSECURITY: HOW HARD IS IT FOR YOU TO PAY FOR THE VERY BASICS LIKE FOOD, HOUSING, MEDICAL CARE, AND HEATING?: NOT HARD AT ALL

## 2023-03-09 SDOH — ECONOMIC STABILITY: FOOD INSECURITY: WITHIN THE PAST 12 MONTHS, THE FOOD YOU BOUGHT JUST DIDN'T LAST AND YOU DIDN'T HAVE MONEY TO GET MORE.: NEVER TRUE

## 2023-03-09 ASSESSMENT — ENCOUNTER SYMPTOMS
DIARRHEA: 0
COLOR CHANGE: 0
NAUSEA: 0
CHEST TIGHTNESS: 0
SHORTNESS OF BREATH: 0
COUGH: 0

## 2023-03-09 NOTE — PROGRESS NOTES
Jorge Clayton (: 1950) is a 75 y.o. male, Established patient, who presents today for:    Chief Complaint   Patient presents with    New Patient     Patient presents today to establish care. 3 months follow-up for the following chronic conditions type 1 diabetes myelitis, microalbuminuria, atrial fibrillation, polyneuropathy, right lower extremity edema. Reports improvement of right lower extremity edema. Denies additional complaints      ASSESSMENT/PLAN    1. Type 1 diabetes mellitus on insulin therapy [facility])  Assessment & Plan:  Denies recent change in weight, feelings of thirst, reports increased thirst increased urination, blurry vision, chronic neuropathy is stable current insulin dosing has been reduced due to patient noting blood glucose levels dropping in the overnight hours. Currently he is in 50-50 mix of R and NPH insulins 40 units in the morning and 38 units at night. Metformin 1000 mg twice daily At goal, continue current medications  Orders:  -     metFORMIN (GLUCOPHAGE) 1000 MG tablet; Take 1 tablet by mouth 2 times daily (with meals), Disp-180 tablet, R-0Normal  -     Hemoglobin A1C; Future  -     CBC with Auto Differential; Future  -     Comprehensive Metabolic Panel; Future  -     Microalbumin / Creatinine Urine Ratio; Future  2. Polyneuropathy due to type 1 diabetes mellitus (HCC)  Assessment & Plan:  Continue symptoms of neuropathy bilateral lower extremities. Well-controlled on Lyrica. Well-controlled, continue current medications  Orders:  -     pregabalin (LYRICA) 150 MG capsule; Take 1 capsule by mouth 3 times daily for 90 days. , Disp-270 capsule, R-0Normal  3. Severe obesity (BMI 35.0-39. 9) with comorbidity [facility])  Assessment & Plan:  Discussed portion control and food choices with patient. Uncontrolled, continue current medications and continue current treatment plan  4.  Microalbuminuria due to type 1 diabetes mellitus ([facility] [address]  Assessment & Plan:  Improve blood glucose control over the last 3 months. Currently on Avapro. We will continue to monitor laboratory findings. Borderline controlled, continue current medications and continue current treatment plan  Orders:  -     metFORMIN (GLUCOPHAGE) 1000 MG tablet; Take 1 tablet by mouth 2 times daily (with meals), Disp-180 tablet, R-0Normal  -     Microalbumin / Creatinine Urine Ratio; Future  5. Paroxysmal atrial fibrillation (HCC)  Assessment & Plan:  Currently stable long-term. Not on anticoagulation has been monitored by cardiology we will continue current plan   6. Type 1 diabetes mellitus with diabetic neuropathy, unspecified  Assessment & Plan:  Continue symptoms of neuropathy bilateral lower extremities. Well-controlled on Lyrica. Well-controlled, continue current medications  Orders:  -     metFORMIN (GLUCOPHAGE) 1000 MG tablet; Take 1 tablet by mouth 2 times daily (with meals), Disp-180 tablet, R-0Normal  -     Hemoglobin A1C; Future  7. Polyneuropathy associated with underlying disease [facility])  Assessment & Plan:       Return in about 3 months (around 6/9/2023), or may be virtual, for Follow up with PCP. SUBJECTIVE/OBJECTIVE:            Current Outpatient Medications on File Prior to Visit   Medication Sig Dispense Refill    hydroCHLOROthiazide (MICROZIDE) 12.5 MG capsule Take by mouth      magnesium oxide (MAG-OX) 400 MG tablet Take by mouth      ciclopirox (LOPROX) 0.77 % cream Apply topically 2 times daily.  30 g 2    NOVOLOG 100 UNIT/ML injection vial INJECT 25 UNITS SUBCUTANEOUSLY THREE TIMES DAILY BEFORE MEAL(S), SLIDING SCALE  IN THE MORNING AND AT BEDTIME 30 mL 3    diclofenac (VOLTAREN) 50 MG EC tablet Take 1 tablet by mouth 3 times daily 90 tablet 2    insulin NPH (NOVOLIN N) 100 UNIT/ML injection vial 30 units before breakfast, 40 units with supper, 15 units at bed time 8 each 3    NOVOLIN R 100 UNIT/ML injection INJECT SUBCUTANEOUSLY THREE TIMES DAILY UP  UNITS  AS NEEDED 9 mL 1    blood glucose test strips (ONETOUCH VERIO) strip USE 1 STRIP TO CHECK GLUCOSE FIVE TIMES DAILY AS INSTRUCTED 200 each 11    Continuous Blood Gluc  (FREESTYLE NUBIA 14 DAY READER) XU Please give 1 reader 14 each 5    irbesartan (AVAPRO) 300 MG tablet Take 1 tablet by mouth once daily 90 tablet 0    tadalafil (CIALIS) 5 MG tablet Take 1 tablet by mouth daily 90 tablet 3    acetaminophen (TYLENOL) 500 MG tablet Take 1,000 mg by mouth every 6 hours as needed for Pain      polyethylene glycol (GLYCOLAX) powder Take 17 g by mouth daily      rosuvastatin (CRESTOR) 20 MG tablet Take 20 mg by mouth every evening       ezetimibe (ZETIA) 10 MG tablet Take 10 mg by mouth daily Indications: takes 1-2 times per week       Lancets MISC Test five times daily. 200 each 5    aspirin 81 MG tablet Take 81 mg by mouth daily      Insulin Syringes, Disposable, U-100 1 ML MISC Use qid for dosing insulin as directed. Dx 250.00 400 each 3    ULTICARE INSULIN SYRINGE 30G X 1/2\" 1 ML MISC USE 4 TIMES DAILY FOR DOSING INSULIN AS DIRECTED 300 each 3    Docusate Sodium (COLACE PO) Take 400 mg by mouth every evening       metoprolol succinate (TOPROL XL) 50 MG extended release tablet Take 50 mg by mouth nightly       mirabegron (MYRBETRIQ) 50 MG TB24 Take 50 mg by mouth daily (Patient not taking: Reported on 3/9/2023)       No current facility-administered medications on file prior to visit. Allergies   Allergen Reactions    Atorvastatin Other (See Comments)     Muscle ache    Shellfish Allergy Itching, Rash and Swelling    Simvastatin Other (See Comments)     cough    Bee Pollen     Icosapent Ethyl     Iodine     Seasonal      Dandelions / maple blossoms cause sinus sx    Shellfish-Derived Products     Pcn [Penicillins] Hives and Rash     Caused eczema as child        Review of Systems   Constitutional:  Negative for fatigue and unexpected weight change. HENT: Negative. Eyes:  Negative for visual disturbance.    Respiratory:  Negative for cough, chest tightness and shortness of breath. Cardiovascular:  Negative for chest pain, palpitations and leg swelling. Gastrointestinal:  Negative for diarrhea and nausea. Endocrine: Negative for polydipsia and polyuria. Genitourinary:  Positive for frequency. Musculoskeletal:  Positive for gait problem. Negative for arthralgias. Skin:  Negative for color change. Neurological:  Negative for weakness, light-headedness, numbness and headaches. Hematological:  Does not bruise/bleed easily. Psychiatric/Behavioral:  Negative for confusion. The patient is not nervous/anxious. Vitals:  /62 (Site: Right Upper Arm, Position: Sitting, Cuff Size: Medium Adult)   Pulse 78   Temp 97.7 °F (36.5 °C) (Temporal)   Ht 5' 11.5\" (1.816 m)   Wt 255 lb 12.8 oz (116 kg)   SpO2 96%   BMI 35.18 kg/m²     Hemoglobin A1C   Date Value Ref Range Status   01/19/2023 7.1 (H) 4.8 - 5.9 % Final        The ASCVD Risk score ([place] DK, et al., 2019) failed to calculate for the following reasons: The valid total cholesterol range is 130 to 320 mg/dL     Physical Exam  Constitutional:       General: He is not in acute distress. Appearance: Normal appearance. HENT:      Head: Normocephalic. Nose: Nose normal.      Mouth/Throat:      Lips: Pink. Mouth: Mucous membranes are moist.   Eyes:      General: Lids are normal. Gaze aligned appropriately. Conjunctiva/sclera: Conjunctivae normal.   Neck:      Thyroid: No thyroid mass. Cardiovascular:      Rate and Rhythm: Normal rate and regular rhythm. Pulses: Normal pulses. Heart sounds: Normal heart sounds, S1 normal and S2 normal. No murmur heard. No gallop. Pulmonary:      Effort: Pulmonary effort is normal.      Breath sounds: Normal breath sounds and air entry. No wheezing or rhonchi. Abdominal:      General: Abdomen is flat. There is no distension. Musculoskeletal:         General: No tenderness. Normal range of motion. Cervical back: Full passive range of motion without pain, normal range of motion and neck supple. No rigidity. Right lower leg: Edema present. Left lower leg: No edema. Lymphadenopathy:      Cervical:      Right cervical: No superficial or posterior cervical adenopathy. Left cervical: No superficial or posterior cervical adenopathy. Skin:     General: Skin is warm and dry. Capillary Refill: Capillary refill takes less than 2 seconds. Coloration: Skin is not jaundiced or pale. Findings: No erythema. Neurological:      General: No focal deficit present. Mental Status: He is alert and oriented to person, place, and time. Motor: Motor function is intact. No weakness. Coordination: Coordination is intact. Coordination normal.      Gait: Gait is intact. Gait normal.   Psychiatric:         Attention and Perception: Attention and perception normal.         Mood and Affect: Mood normal.         Speech: Speech normal.         Behavior: Behavior normal. Behavior is cooperative. Thought Content: Thought content normal.         Cognition and Memory: Cognition and memory normal.         Judgment: Judgment normal.                     An electronic signature was used to authenticate this note.      Alden Franco, KANE - CNP

## 2023-03-10 ENCOUNTER — TELEPHONE (OUTPATIENT)
Dept: FAMILY MEDICINE CLINIC | Age: 73
End: 2023-03-10

## 2023-03-10 PROBLEM — G63 POLYNEUROPATHY ASSOCIATED WITH UNDERLYING DISEASE (HCC): Status: RESOLVED | Noted: 2020-06-17 | Resolved: 2023-03-10

## 2023-03-10 NOTE — ASSESSMENT & PLAN NOTE
Currently stable long-term.   Not on anticoagulation has been monitored by cardiology we will continue current plan

## 2023-03-10 NOTE — TELEPHONE ENCOUNTER
Pt asking does he need have blood work done to check his  potasium levels ? If yes please send a message in [employer] and he will get his labs done . Please advise.

## 2023-03-10 NOTE — ASSESSMENT & PLAN NOTE
Continue symptoms of neuropathy bilateral lower extremities. Well-controlled on Lyrica.  Well-controlled, continue current medications

## 2023-03-10 NOTE — ASSESSMENT & PLAN NOTE
Improve blood glucose control over the last 3 months. Currently on Avapro. We will continue to monitor laboratory findings.  Borderline controlled, continue current medications and continue current treatment plan

## 2023-03-10 NOTE — ASSESSMENT & PLAN NOTE
Denies recent change in weight, feelings of thirst, reports increased thirst increased urination, blurry vision, chronic neuropathy is stable current insulin dosing has been reduced due to patient noting blood glucose levels dropping in the overnight hours. Currently he is in 50-50 mix of R and NPH insulins 40 units in the morning and 38 units at night.   Metformin 1000 mg twice daily At goal, continue current medications

## 2023-03-10 NOTE — ASSESSMENT & PLAN NOTE
Discussed portion control and food choices with patient.  Uncontrolled, continue current medications and continue current treatment plan

## 2023-03-10 NOTE — TELEPHONE ENCOUNTER
My apologies yes. We can return to normal routine lab work. I expect levels to remain in normal range and possibly lower especially since hydrochlorothiazide has been added to your medication regimen.   Orders were placed for the labs, have them drawn 1 week prior to your next appointment in June

## 2023-03-22 ENCOUNTER — PATIENT MESSAGE (OUTPATIENT)
Dept: FAMILY MEDICINE CLINIC | Age: 73
End: 2023-03-22

## 2023-03-22 DIAGNOSIS — M15.9 PRIMARY OSTEOARTHRITIS INVOLVING MULTIPLE JOINTS: ICD-10-CM

## 2023-03-23 NOTE — TELEPHONE ENCOUNTER
Pt is requesting medication refill.  Please approve or deny this request.    Rx requested:  Requested Prescriptions     Pending Prescriptions Disp Refills    diclofenac (VOLTAREN) 50 MG EC tablet 90 tablet 2     Sig: Take 1 tablet by mouth 3 times daily         Last Office Visit:   3/9/2023      Next Visit Date:  Future Appointments   Date Time Provider Amado Aceves   6/9/2023  8:00 AM KANE Edwards - CNP [facility]

## 2023-03-23 NOTE — TELEPHONE ENCOUNTER
From: Karyn Bhatia  To: Christel Wright  Sent: 3/22/2023 8:57 PM EDT  Subject: Refill needed    I need a script for Diclofenac Sodium 50mg Dr Kathe Connor, would prefer a 100 day supply.  BTW, the leg has returned to normal. Thanks amanda

## 2023-06-05 ENCOUNTER — HOSPITAL ENCOUNTER (OUTPATIENT)
Dept: LAB | Age: 73
Discharge: HOME | End: 2023-06-05
Payer: MEDICARE

## 2023-06-05 ENCOUNTER — TELEPHONE (OUTPATIENT)
Dept: FAMILY MEDICINE CLINIC | Age: 73
End: 2023-06-05

## 2023-06-05 DIAGNOSIS — E10.40 TYPE 1 DIABETES MELLITUS WITH DIABETIC NEUROPATHY, UNSPECIFIED (HCC): ICD-10-CM

## 2023-06-05 DIAGNOSIS — E87.5 HYPERKALEMIA: Primary | ICD-10-CM

## 2023-06-05 DIAGNOSIS — E10.9 TYPE 1 DIABETES MELLITUS ON INSULIN THERAPY (HCC): ICD-10-CM

## 2023-06-05 DIAGNOSIS — E10.29 MICROALBUMINURIA DUE TO TYPE 1 DIABETES MELLITUS (HCC): ICD-10-CM

## 2023-06-05 DIAGNOSIS — R80.9 MICROALBUMINURIA DUE TO TYPE 1 DIABETES MELLITUS (HCC): ICD-10-CM

## 2023-06-05 LAB
ALBUMIN SERPL-MCNC: 3.9 G/DL (ref 3.5–4.6)
ALP SERPL-CCNC: 66 U/L (ref 35–104)
ALT SERPL-CCNC: 16 U/L (ref 0–41)
ANION GAP SERPL CALCULATED.3IONS-SCNC: 11 MEQ/L (ref 9–15)
AST SERPL-CCNC: 17 U/L (ref 0–40)
BASOPHILS # BLD: 0.1 K/UL (ref 0–0.2)
BASOPHILS NFR BLD: 1.5 %
BILIRUB SERPL-MCNC: 0.3 MG/DL (ref 0.2–0.7)
BUN SERPL-MCNC: 21 MG/DL (ref 8–23)
CALCIUM SERPL-MCNC: 9.3 MG/DL (ref 8.5–9.9)
CHLORIDE SERPL-SCNC: 103 MEQ/L (ref 95–107)
CO2 SERPL-SCNC: 23 MEQ/L (ref 20–31)
CREAT SERPL-MCNC: 1.09 MG/DL (ref 0.7–1.2)
CREAT UR-MCNC: 155.2 MG/DL
EOSINOPHIL # BLD: 0.2 K/UL (ref 0–0.7)
EOSINOPHIL NFR BLD: 4 %
ERYTHROCYTE [DISTWIDTH] IN BLOOD BY AUTOMATED COUNT: 13.7 % (ref 11.5–14.5)
GLOBULIN SER CALC-MCNC: 2.6 G/DL (ref 2.3–3.5)
GLUCOSE SERPL-MCNC: 101 MG/DL (ref 70–99)
HBA1C MFR BLD: 6.3 % (ref 4.8–5.9)
HCT VFR BLD AUTO: 39.2 % (ref 42–52)
HGB BLD-MCNC: 13.2 G/DL (ref 14–18)
LYMPHOCYTES # BLD: 1.1 K/UL (ref 1–4.8)
LYMPHOCYTES NFR BLD: 27.2 %
MCH RBC QN AUTO: 31.3 PG (ref 27–31.3)
MCHC RBC AUTO-ENTMCNC: 33.6 % (ref 33–37)
MCV RBC AUTO: 93 FL (ref 79–92.2)
MICROALBUMIN UR-MCNC: 6.5 MG/DL
MICROALBUMIN/CREAT UR-RTO: 41.9 MG/G (ref 0–30)
MONOCYTES # BLD: 0.4 K/UL (ref 0.2–0.8)
MONOCYTES NFR BLD: 9.1 %
NEUTROPHILS # BLD: 2.4 K/UL (ref 1.4–6.5)
NEUTS SEG NFR BLD: 58.2 %
PLATELET # BLD AUTO: 117 K/UL (ref 130–400)
POTASSIUM SERPL-SCNC: 6 MEQ/L (ref 3.4–4.9)
PROT SERPL-MCNC: 6.5 G/DL (ref 6.3–8)
RBC # BLD AUTO: 4.21 M/UL (ref 4.7–6.1)
SODIUM SERPL-SCNC: 137 MEQ/L (ref 135–144)
WBC # BLD AUTO: 4.1 K/UL (ref 4.8–10.8)

## 2023-06-05 PROCEDURE — 83036 HEMOGLOBIN GLYCOSYLATED A1C: CPT

## 2023-06-05 PROCEDURE — 82570 ASSAY OF URINE CREATININE: CPT

## 2023-06-05 PROCEDURE — 80053 COMPREHEN METABOLIC PANEL: CPT

## 2023-06-05 PROCEDURE — 85025 COMPLETE CBC W/AUTO DIFF WBC: CPT

## 2023-06-05 PROCEDURE — 36415 COLL VENOUS BLD VENIPUNCTURE: CPT

## 2023-06-05 PROCEDURE — 82043 UR ALBUMIN QUANTITATIVE: CPT

## 2023-06-05 NOTE — TELEPHONE ENCOUNTER
critical lab result -  Potassium 6.0    [facility] [place], [facility] . Juan Manuel aware.

## 2023-06-08 ENCOUNTER — HOSPITAL ENCOUNTER (OUTPATIENT)
Dept: LAB | Age: 73
Discharge: HOME | End: 2023-06-08
Payer: MEDICARE

## 2023-06-08 DIAGNOSIS — E87.5 HYPERKALEMIA: ICD-10-CM

## 2023-06-08 LAB — POTASSIUM SERPL-SCNC: 5.3 MEQ/L (ref 3.4–4.9)

## 2023-06-08 PROCEDURE — 84132 ASSAY OF SERUM POTASSIUM: CPT

## 2023-06-08 PROCEDURE — 36415 COLL VENOUS BLD VENIPUNCTURE: CPT

## 2023-06-09 PROBLEM — E87.5 SERUM POTASSIUM ELEVATED: Status: ACTIVE | Noted: 2023-06-09

## 2023-06-26 ENCOUNTER — PATIENT MESSAGE (OUTPATIENT)
Dept: FAMILY MEDICINE CLINIC | Age: 73
End: 2023-06-26

## 2023-06-26 DIAGNOSIS — M15.9 PRIMARY OSTEOARTHRITIS INVOLVING MULTIPLE JOINTS: ICD-10-CM

## 2023-06-26 DIAGNOSIS — E10.9 TYPE 1 DIABETES MELLITUS ON INSULIN THERAPY (HCC): ICD-10-CM

## 2023-06-27 RX ORDER — FLASH GLUCOSE SCANNING READER
EACH MISCELLANEOUS
Qty: 14 EACH | Refills: 5 | Status: SHIPPED | OUTPATIENT
Start: 2023-06-27

## 2023-06-27 RX ORDER — BLOOD SUGAR DIAGNOSTIC
STRIP MISCELLANEOUS
Qty: 200 EACH | Refills: 11 | Status: SHIPPED | OUTPATIENT
Start: 2023-06-27

## 2023-07-25 ENCOUNTER — HOSPITAL ENCOUNTER (OUTPATIENT)
Dept: LAB | Age: 73
Discharge: HOME | End: 2023-07-25

## 2023-07-26 DIAGNOSIS — E10.40 TYPE 1 DIABETES MELLITUS WITH DIABETIC NEUROPATHY, UNSPECIFIED (HCC): Primary | ICD-10-CM

## 2023-07-26 RX ORDER — FLASH GLUCOSE SENSOR
1 KIT MISCELLANEOUS
Qty: 3 EACH | Refills: 3 | Status: SHIPPED | OUTPATIENT
Start: 2023-07-26

## 2023-09-18 DIAGNOSIS — M15.9 PRIMARY OSTEOARTHRITIS INVOLVING MULTIPLE JOINTS: ICD-10-CM

## 2023-09-18 NOTE — TELEPHONE ENCOUNTER
Pharmacy is requesting medication refill. Please approve or deny this request.    Rx requested:  Requested Prescriptions     Pending Prescriptions Disp Refills    diclofenac (VOLTAREN) 50 MG EC tablet [Pharmacy Med Name: Diclofenac Sodium 50 MG Oral Tablet Delayed Release] 90 tablet 0     Sig: TAKE 1 TABLET BY MOUTH THREE TIMES DAILY         Last Office Visit:   6/9/2023      Next Visit Date:  No future appointments.

## 2023-10-05 ENCOUNTER — HOSPITAL ENCOUNTER (OUTPATIENT)
Dept: LAB | Age: 73
Discharge: HOME | End: 2023-10-05
Payer: MEDICARE

## 2023-10-05 DIAGNOSIS — E10.9 TYPE 1 DIABETES MELLITUS ON INSULIN THERAPY (HCC): ICD-10-CM

## 2023-10-05 LAB
ALBUMIN SERPL-MCNC: 3.8 G/DL (ref 3.5–4.6)
ALP SERPL-CCNC: 66 U/L (ref 35–104)
ALT SERPL-CCNC: 17 U/L (ref 0–41)
ANION GAP SERPL CALCULATED.3IONS-SCNC: 8 MEQ/L (ref 9–15)
AST SERPL-CCNC: 19 U/L (ref 0–40)
BASOPHILS # BLD: 0 K/UL (ref 0–0.2)
BASOPHILS NFR BLD: 1.3 %
BILIRUB SERPL-MCNC: <0.2 MG/DL (ref 0.2–0.7)
BUN SERPL-MCNC: 21 MG/DL (ref 8–23)
CALCIUM SERPL-MCNC: 9 MG/DL (ref 8.5–9.9)
CHLORIDE SERPL-SCNC: 105 MEQ/L (ref 95–107)
CHOLEST SERPL-MCNC: 74 MG/DL (ref 0–199)
CO2 SERPL-SCNC: 26 MEQ/L (ref 20–31)
CREAT SERPL-MCNC: 1.13 MG/DL (ref 0.7–1.2)
EOSINOPHIL # BLD: 0.2 K/UL (ref 0–0.7)
EOSINOPHIL NFR BLD: 7.6 %
ERYTHROCYTE [DISTWIDTH] IN BLOOD BY AUTOMATED COUNT: 13.5 % (ref 11.5–14.5)
GLOBULIN SER CALC-MCNC: 2.3 G/DL (ref 2.3–3.5)
GLUCOSE SERPL-MCNC: 76 MG/DL (ref 70–99)
HBA1C MFR BLD: 6.6 % (ref 4.8–5.9)
HCT VFR BLD AUTO: 36 % (ref 42–52)
HDLC SERPL-MCNC: 35 MG/DL (ref 40–59)
HGB BLD-MCNC: 11.5 G/DL (ref 14–18)
LDLC SERPL CALC-MCNC: 24 MG/DL (ref 0–129)
LYMPHOCYTES # BLD: 0.8 K/UL (ref 1–4.8)
LYMPHOCYTES NFR BLD: 26.6 %
MCH RBC QN AUTO: 31.7 PG (ref 27–31.3)
MCHC RBC AUTO-ENTMCNC: 31.9 % (ref 33–37)
MCV RBC AUTO: 99.2 FL (ref 79–92.2)
MONOCYTES # BLD: 0.5 K/UL (ref 0.2–0.8)
MONOCYTES NFR BLD: 15.2 %
NEUTROPHILS # BLD: 1.6 K/UL (ref 1.4–6.5)
NEUTS SEG NFR BLD: 49.3 %
PLATELET # BLD AUTO: 142 K/UL (ref 130–400)
POTASSIUM SERPL-SCNC: 5.1 MEQ/L (ref 3.4–4.9)
PROT SERPL-MCNC: 6.1 G/DL (ref 6.3–8)
RBC # BLD AUTO: 3.63 M/UL (ref 4.7–6.1)
SODIUM SERPL-SCNC: 139 MEQ/L (ref 135–144)
TRIGL SERPL-MCNC: 74 MG/DL (ref 0–150)
WBC # BLD AUTO: 3.2 K/UL (ref 4.8–10.8)

## 2023-10-05 PROCEDURE — 83036 HEMOGLOBIN GLYCOSYLATED A1C: CPT

## 2023-10-05 PROCEDURE — 80061 LIPID PANEL: CPT

## 2023-10-05 PROCEDURE — 85025 COMPLETE CBC W/AUTO DIFF WBC: CPT

## 2023-10-05 PROCEDURE — 36415 COLL VENOUS BLD VENIPUNCTURE: CPT

## 2023-10-05 PROCEDURE — 80053 COMPREHEN METABOLIC PANEL: CPT

## 2023-10-06 ENCOUNTER — OFFICE VISIT (OUTPATIENT)
Dept: FAMILY MEDICINE CLINIC | Age: 73
End: 2023-10-06
Payer: MEDICARE

## 2023-10-06 VITALS
DIASTOLIC BLOOD PRESSURE: 60 MMHG | SYSTOLIC BLOOD PRESSURE: 136 MMHG | OXYGEN SATURATION: 96 % | WEIGHT: 250 LBS | HEART RATE: 87 BPM | TEMPERATURE: 97.9 F | BODY MASS INDEX: 33.86 KG/M2 | HEIGHT: 72 IN

## 2023-10-06 DIAGNOSIS — E10.42 POLYNEUROPATHY DUE TO TYPE 1 DIABETES MELLITUS (HCC): ICD-10-CM

## 2023-10-06 DIAGNOSIS — E10.9 TYPE 1 DIABETES MELLITUS ON INSULIN THERAPY (HCC): ICD-10-CM

## 2023-10-06 DIAGNOSIS — D69.6 THROMBOCYTOPENIA, UNSPECIFIED (HCC): ICD-10-CM

## 2023-10-06 DIAGNOSIS — D53.9 MACROCYTIC ANEMIA: Primary | ICD-10-CM

## 2023-10-06 PROCEDURE — 3078F DIAST BP <80 MM HG: CPT

## 2023-10-06 PROCEDURE — 81002 URINALYSIS NONAUTO W/O SCOPE: CPT

## 2023-10-06 PROCEDURE — 99214 OFFICE O/P EST MOD 30 MIN: CPT

## 2023-10-06 PROCEDURE — 1123F ACP DISCUSS/DSCN MKR DOCD: CPT

## 2023-10-06 PROCEDURE — 3044F HG A1C LEVEL LT 7.0%: CPT

## 2023-10-06 PROCEDURE — 3074F SYST BP LT 130 MM HG: CPT

## 2023-10-06 RX ORDER — PREGABALIN 150 MG/1
150 CAPSULE ORAL 3 TIMES DAILY
Qty: 270 CAPSULE | Refills: 0 | Status: SHIPPED | OUTPATIENT
Start: 2023-10-06 | End: 2024-01-04

## 2023-10-09 ASSESSMENT — ENCOUNTER SYMPTOMS
SHORTNESS OF BREATH: 0
COLOR CHANGE: 0
COUGH: 0
DIARRHEA: 0
CHEST TIGHTNESS: 0
NAUSEA: 0

## 2023-10-12 ENCOUNTER — HOSPITAL ENCOUNTER (OUTPATIENT)
Dept: LAB | Age: 73
Discharge: HOME | End: 2023-10-12
Payer: MEDICARE

## 2023-10-12 ENCOUNTER — HOSPITAL ENCOUNTER (OUTPATIENT)
Dept: CARDIOLOGY | Facility: HOSPITAL | Age: 73
Discharge: HOME | End: 2023-10-12
Payer: MEDICARE

## 2023-10-12 DIAGNOSIS — D53.9 MACROCYTIC ANEMIA: Primary | ICD-10-CM

## 2023-10-12 DIAGNOSIS — I48.92 ATRIAL FIBRILLATION AND FLUTTER (MULTI): ICD-10-CM

## 2023-10-12 DIAGNOSIS — I48.91 ATRIAL FIBRILLATION AND FLUTTER (MULTI): ICD-10-CM

## 2023-10-12 DIAGNOSIS — Z95.818 STATUS POST PLACEMENT OF IMPLANTABLE LOOP RECORDER: ICD-10-CM

## 2023-10-12 DIAGNOSIS — Z95.818 STATUS POST PLACEMENT OF IMPLANTABLE LOOP RECORDER: Primary | ICD-10-CM

## 2023-10-12 DIAGNOSIS — D53.9 MACROCYTIC ANEMIA: ICD-10-CM

## 2023-10-12 LAB
BACTERIA URNS QL MICRO: NEGATIVE /HPF
BILIRUB UR QL STRIP: NEGATIVE
CLARITY UR: CLEAR
COLOR UR: YELLOW
EPI CELLS #/AREA URNS AUTO: NORMAL /HPF (ref 0–5)
GLUCOSE UR STRIP-MCNC: 100 MG/DL
HGB UR QL STRIP: NEGATIVE
HYALINE CASTS #/AREA URNS AUTO: NORMAL /HPF (ref 0–5)
KETONES UR STRIP-MCNC: NEGATIVE MG/DL
LEUKOCYTE ESTERASE UR QL STRIP: NEGATIVE
NITRITE UR QL STRIP: NEGATIVE
PH UR STRIP: 5.5 [PH] (ref 5–9)
PROT UR STRIP-MCNC: 30 MG/DL
RBC #/AREA URNS AUTO: NORMAL /HPF (ref 0–5)
SP GR UR STRIP: 1.02 (ref 1–1.03)
UROBILINOGEN UR STRIP-ACNC: 0.2 E.U./DL
WBC #/AREA URNS AUTO: NORMAL /HPF (ref 0–5)

## 2023-10-12 PROCEDURE — 82746 ASSAY OF FOLIC ACID SERUM: CPT

## 2023-10-12 PROCEDURE — 82607 VITAMIN B-12: CPT

## 2023-10-12 PROCEDURE — 83540 ASSAY OF IRON: CPT

## 2023-10-12 PROCEDURE — 93298 REM INTERROG DEV EVAL SCRMS: CPT | Performed by: INTERNAL MEDICINE

## 2023-10-12 PROCEDURE — 81001 URINALYSIS AUTO W/SCOPE: CPT

## 2023-10-12 PROCEDURE — 82274 ASSAY TEST FOR BLOOD FECAL: CPT

## 2023-10-12 PROCEDURE — 83550 IRON BINDING TEST: CPT

## 2023-10-13 ENCOUNTER — HOSPITAL ENCOUNTER (OUTPATIENT)
Dept: CARDIOLOGY | Facility: HOSPITAL | Age: 73
Discharge: HOME | End: 2023-10-13
Payer: MEDICARE

## 2023-10-13 DIAGNOSIS — I48.91 ATRIAL FIBRILLATION AND FLUTTER (MULTI): ICD-10-CM

## 2023-10-13 DIAGNOSIS — Z95.818 STATUS POST PLACEMENT OF IMPLANTABLE LOOP RECORDER: ICD-10-CM

## 2023-10-13 DIAGNOSIS — I48.92 ATRIAL FIBRILLATION AND FLUTTER (MULTI): ICD-10-CM

## 2023-10-13 DIAGNOSIS — D51.9 ANEMIA DUE TO VITAMIN B12 DEFICIENCY, UNSPECIFIED B12 DEFICIENCY TYPE: Primary | ICD-10-CM

## 2023-10-13 LAB
FOLATE: 12.3 NG/ML (ref 4.8–24.2)
HEMOCCULT STL QL IA: NORMAL
IRON SATURATION: 23 % (ref 20–55)
IRON: 83 UG/DL (ref 59–158)
TOTAL IRON BINDING CAPACITY: 366 UG/DL (ref 250–450)
UNSATURATED IRON BINDING CAPACITY: 283 UG/DL (ref 112–347)
VITAMIN B-12: <150 PG/ML (ref 232–1245)

## 2023-10-17 DIAGNOSIS — E10.9 TYPE 1 DIABETES MELLITUS ON INSULIN THERAPY (HCC): ICD-10-CM

## 2023-10-17 NOTE — TELEPHONE ENCOUNTER
Pharmacy is requesting medication refill. Please approve or deny this request.    Rx requested:  Requested Prescriptions     Pending Prescriptions Disp Refills    NOVOLIN R 100 UNIT/ML injection 9 mL 1     Sig: INJECT SUBCUTANEOUSLY THREE TIMES DAILY UP  UNITS  AS NEEDED         Last Office Visit:   10/6/2023      Next Visit Date:  No future appointments.

## 2023-10-18 RX ORDER — HUMAN INSULIN 100 [IU]/ML
INJECTION, SOLUTION SUBCUTANEOUS
Qty: 9 ML | Refills: 1 | Status: SHIPPED | OUTPATIENT
Start: 2023-10-18

## 2023-10-28 DIAGNOSIS — M15.9 PRIMARY OSTEOARTHRITIS INVOLVING MULTIPLE JOINTS: ICD-10-CM

## 2023-10-30 NOTE — TELEPHONE ENCOUNTER
Comments:     Last Office Visit (last PCP visit):   10/6/2023    Next Visit Date:  No future appointments. **If hasn't been seen in over a year OR hasn't followed up according to last diabetes/ADHD visit, make appointment for patient before sending refill to provider.     Rx requested:  Requested Prescriptions     Pending Prescriptions Disp Refills    diclofenac (VOLTAREN) 50 MG EC tablet [Pharmacy Med Name: Diclofenac Sodium 50 MG Oral Tablet Delayed Release] 90 tablet 0     Sig: TAKE 1 TABLET BY MOUTH THREE TIMES DAILY

## 2023-11-03 ENCOUNTER — HOSPITAL ENCOUNTER (OUTPATIENT)
Dept: CARDIOLOGY | Facility: HOSPITAL | Age: 73
Discharge: HOME | End: 2023-11-03
Payer: MEDICARE

## 2023-11-03 PROCEDURE — 93298 REM INTERROG DEV EVAL SCRMS: CPT | Performed by: INTERNAL MEDICINE

## 2023-11-17 ENCOUNTER — HOSPITAL ENCOUNTER (OUTPATIENT)
Dept: CARDIOLOGY | Facility: HOSPITAL | Age: 73
Discharge: HOME | End: 2023-11-17
Payer: MEDICARE

## 2023-11-17 DIAGNOSIS — I48.91 ATRIAL FIBRILLATION AND FLUTTER (MULTI): ICD-10-CM

## 2023-11-17 DIAGNOSIS — Z95.818 STATUS POST PLACEMENT OF IMPLANTABLE LOOP RECORDER: ICD-10-CM

## 2023-11-17 DIAGNOSIS — E78.2 MIXED HYPERLIPIDEMIA: ICD-10-CM

## 2023-11-17 DIAGNOSIS — I48.92 ATRIAL FIBRILLATION AND FLUTTER (MULTI): ICD-10-CM

## 2023-11-17 PROBLEM — E87.5 HYPERKALEMIA: Status: ACTIVE | Noted: 2023-11-17

## 2023-11-17 PROBLEM — E11.9 DIABETES MELLITUS (MULTI): Status: ACTIVE | Noted: 2023-11-17

## 2023-11-17 PROBLEM — R94.31 ABNORMAL EKG: Status: ACTIVE | Noted: 2023-11-17

## 2023-11-17 PROBLEM — Z79.899 HIGH RISK MEDICATION USE: Status: ACTIVE | Noted: 2023-11-17

## 2023-11-17 PROBLEM — R60.0 EDEMA, LOWER EXTREMITY: Status: ACTIVE | Noted: 2023-11-17

## 2023-11-17 PROBLEM — I47.10 PAROXYSMAL SUPRAVENTRICULAR TACHYCARDIA (CMS-HCC): Status: ACTIVE | Noted: 2023-11-17

## 2023-11-17 PROBLEM — I45.2 RIGHT BUNDLE BRANCH BLOCK WITH LEFT ANTERIOR FASCICULAR BLOCK: Status: ACTIVE | Noted: 2023-11-17

## 2023-11-17 PROBLEM — R29.6 FREQUENT FALLS: Status: ACTIVE | Noted: 2023-11-17

## 2023-11-17 PROBLEM — E78.5 HYPERLIPEMIA: Status: ACTIVE | Noted: 2023-11-17

## 2023-11-17 PROBLEM — R06.02 SHORTNESS OF BREATH: Status: ACTIVE | Noted: 2023-11-17

## 2023-11-17 PROBLEM — I48.0 PAF (PAROXYSMAL ATRIAL FIBRILLATION) (MULTI): Status: ACTIVE | Noted: 2023-11-17

## 2023-11-17 PROBLEM — G60.8 PERIPHERAL SENSORY NEUROPATHY: Status: ACTIVE | Noted: 2023-11-17

## 2023-11-17 PROBLEM — I10 HYPERTENSION: Status: ACTIVE | Noted: 2023-11-17

## 2023-11-17 PROBLEM — N18.9 CKD (CHRONIC KIDNEY DISEASE): Status: ACTIVE | Noted: 2023-11-17

## 2023-11-17 PROBLEM — R93.1 ABNORMAL ECHOCARDIOGRAM: Status: ACTIVE | Noted: 2023-11-17

## 2023-11-17 RX ORDER — IRBESARTAN 300 MG/1
TABLET ORAL DAILY
COMMUNITY
End: 2024-04-18 | Stop reason: SDUPTHER

## 2023-11-17 RX ORDER — POLYETHYLENE GLYCOL 3350 17 G/17G
POWDER, FOR SOLUTION ORAL
COMMUNITY

## 2023-11-17 RX ORDER — ROSUVASTATIN CALCIUM 20 MG/1
TABLET, COATED ORAL
COMMUNITY
End: 2023-11-17 | Stop reason: SDUPTHER

## 2023-11-17 RX ORDER — EZETIMIBE 10 MG/1
10 TABLET ORAL
COMMUNITY

## 2023-11-17 RX ORDER — CALCIUM CARBONATE 300MG(750)
TABLET,CHEWABLE ORAL
COMMUNITY

## 2023-11-17 RX ORDER — DULOXETIN HYDROCHLORIDE 60 MG/1
CAPSULE, DELAYED RELEASE ORAL
COMMUNITY

## 2023-11-17 RX ORDER — TADALAFIL 5 MG/1
TABLET ORAL
COMMUNITY
End: 2024-02-07

## 2023-11-17 RX ORDER — HUMAN INSULIN 100 [IU]/ML
INJECTION, SOLUTION SUBCUTANEOUS
COMMUNITY

## 2023-11-17 RX ORDER — PREGABALIN 150 MG/1
CAPSULE ORAL
COMMUNITY

## 2023-11-17 RX ORDER — METFORMIN HYDROCHLORIDE 500 MG/1
TABLET ORAL
COMMUNITY
End: 2024-02-07

## 2023-11-17 RX ORDER — METOPROLOL SUCCINATE 50 MG/1
TABLET, EXTENDED RELEASE ORAL
COMMUNITY
End: 2023-11-27 | Stop reason: SDUPTHER

## 2023-11-17 RX ORDER — ASPIRIN 81 MG/1
TABLET ORAL
COMMUNITY

## 2023-11-17 RX ORDER — DOCUSATE SODIUM 100 MG/1
CAPSULE, LIQUID FILLED ORAL
COMMUNITY

## 2023-11-17 RX ORDER — INSULIN LISPRO 100 [IU]/ML
INJECTION, SOLUTION INTRAVENOUS; SUBCUTANEOUS
COMMUNITY

## 2023-11-17 RX ORDER — ROSUVASTATIN CALCIUM 20 MG/1
TABLET, COATED ORAL
Qty: 90 TABLET | Refills: 1 | Status: SHIPPED | OUTPATIENT
Start: 2023-11-17 | End: 2024-02-07 | Stop reason: SDUPTHER

## 2023-11-25 DIAGNOSIS — M15.9 PRIMARY OSTEOARTHRITIS INVOLVING MULTIPLE JOINTS: ICD-10-CM

## 2023-11-27 DIAGNOSIS — I10 PRIMARY HYPERTENSION: ICD-10-CM

## 2023-11-27 RX ORDER — METOPROLOL SUCCINATE 50 MG/1
50 TABLET, EXTENDED RELEASE ORAL DAILY
Qty: 90 TABLET | Refills: 0 | Status: SHIPPED | OUTPATIENT
Start: 2023-11-27 | End: 2024-02-07 | Stop reason: SDUPTHER

## 2023-12-01 ENCOUNTER — HOSPITAL ENCOUNTER (OUTPATIENT)
Dept: CARDIOLOGY | Facility: HOSPITAL | Age: 73
Discharge: HOME | End: 2023-12-01
Payer: MEDICARE

## 2023-12-01 DIAGNOSIS — Z95.818 STATUS POST PLACEMENT OF IMPLANTABLE LOOP RECORDER: ICD-10-CM

## 2023-12-01 DIAGNOSIS — I48.91 ATRIAL FIBRILLATION AND FLUTTER (MULTI): ICD-10-CM

## 2023-12-01 DIAGNOSIS — I48.92 ATRIAL FIBRILLATION AND FLUTTER (MULTI): ICD-10-CM

## 2023-12-22 ENCOUNTER — HOSPITAL ENCOUNTER (OUTPATIENT)
Dept: CARDIOLOGY | Facility: HOSPITAL | Age: 73
Discharge: HOME | End: 2023-12-22
Payer: MEDICARE

## 2023-12-22 DIAGNOSIS — I48.92 ATRIAL FIBRILLATION AND FLUTTER (MULTI): ICD-10-CM

## 2023-12-22 DIAGNOSIS — Z95.818 STATUS POST PLACEMENT OF IMPLANTABLE LOOP RECORDER: ICD-10-CM

## 2023-12-22 DIAGNOSIS — I48.91 ATRIAL FIBRILLATION AND FLUTTER (MULTI): ICD-10-CM

## 2023-12-22 PROCEDURE — G2066 INTER DEVC REMOTE 30D: HCPCS

## 2023-12-22 PROCEDURE — 93298 REM INTERROG DEV EVAL SCRMS: CPT | Performed by: INTERNAL MEDICINE

## 2023-12-27 DIAGNOSIS — M15.9 PRIMARY OSTEOARTHRITIS INVOLVING MULTIPLE JOINTS: ICD-10-CM

## 2024-01-09 ENCOUNTER — HOSPITAL ENCOUNTER (OUTPATIENT)
Dept: LAB | Age: 74
Discharge: HOME | End: 2024-01-09
Payer: MEDICARE

## 2024-01-09 DIAGNOSIS — E87.5 SERUM POTASSIUM ELEVATED: ICD-10-CM

## 2024-01-09 DIAGNOSIS — Z87.2 HISTORY OF CELLULITIS: ICD-10-CM

## 2024-01-09 DIAGNOSIS — L03.115 CELLULITIS OF RIGHT LOWER EXTREMITY: ICD-10-CM

## 2024-01-09 DIAGNOSIS — M79.89 PAIN AND SWELLING OF RIGHT LOWER LEG: ICD-10-CM

## 2024-01-09 DIAGNOSIS — M79.661 PAIN AND SWELLING OF RIGHT LOWER LEG: ICD-10-CM

## 2024-01-09 DIAGNOSIS — E10.9 TYPE 1 DIABETES MELLITUS ON INSULIN THERAPY (HCC): ICD-10-CM

## 2024-01-09 LAB
ALBUMIN SERPL-MCNC: 3.8 G/DL (ref 3.5–4.6)
ALP SERPL-CCNC: 75 U/L (ref 35–104)
ALT SERPL-CCNC: 54 U/L (ref 0–41)
ANION GAP SERPL CALCULATED.3IONS-SCNC: 9 MEQ/L (ref 9–15)
AST SERPL-CCNC: 29 U/L (ref 0–40)
BASOPHILS # BLD: 0 K/UL (ref 0–0.2)
BASOPHILS NFR BLD: 0.8 %
BILIRUB SERPL-MCNC: 0.3 MG/DL (ref 0.2–0.7)
BUN SERPL-MCNC: 21 MG/DL (ref 8–23)
CALCIUM SERPL-MCNC: 9.2 MG/DL (ref 8.5–9.9)
CHLORIDE SERPL-SCNC: 101 MEQ/L (ref 95–107)
CO2 SERPL-SCNC: 28 MEQ/L (ref 20–31)
CREAT SERPL-MCNC: 1.4 MG/DL (ref 0.7–1.2)
EOSINOPHIL # BLD: 0.3 K/UL (ref 0–0.7)
EOSINOPHIL NFR BLD: 6.5 %
ERYTHROCYTE [DISTWIDTH] IN BLOOD BY AUTOMATED COUNT: 13.3 % (ref 11.5–14.5)
GLOBULIN SER CALC-MCNC: 2.6 G/DL (ref 2.3–3.5)
GLUCOSE SERPL-MCNC: 115 MG/DL (ref 70–99)
HBA1C MFR BLD: 7.4 % (ref 4.8–5.9)
HCT VFR BLD AUTO: 39.4 % (ref 42–52)
HGB BLD-MCNC: 12.8 G/DL (ref 14–18)
LYMPHOCYTES # BLD: 1.1 K/UL (ref 1–4.8)
LYMPHOCYTES NFR BLD: 23.7 %
MCH RBC QN AUTO: 31.1 PG (ref 27–31.3)
MCHC RBC AUTO-ENTMCNC: 32.5 % (ref 33–37)
MCV RBC AUTO: 95.6 FL (ref 79–92.2)
MONOCYTES # BLD: 0.6 K/UL (ref 0.2–0.8)
MONOCYTES NFR BLD: 12 %
NEUTROPHILS # BLD: 2.7 K/UL (ref 1.4–6.5)
NEUTS SEG NFR BLD: 56.6 %
PLATELET # BLD AUTO: 162 K/UL (ref 130–400)
POTASSIUM SERPL-SCNC: 5.9 MEQ/L (ref 3.4–4.9)
PROT SERPL-MCNC: 6.4 G/DL (ref 6.3–8)
RBC # BLD AUTO: 4.12 M/UL (ref 4.7–6.1)
SODIUM SERPL-SCNC: 138 MEQ/L (ref 135–144)
WBC # BLD AUTO: 4.8 K/UL (ref 4.8–10.8)

## 2024-01-09 PROCEDURE — 36415 COLL VENOUS BLD VENIPUNCTURE: CPT

## 2024-01-09 PROCEDURE — 83036 HEMOGLOBIN GLYCOSYLATED A1C: CPT

## 2024-01-09 PROCEDURE — 85025 COMPLETE CBC W/AUTO DIFF WBC: CPT

## 2024-01-09 PROCEDURE — 80053 COMPREHEN METABOLIC PANEL: CPT

## 2024-01-11 ASSESSMENT — PATIENT HEALTH QUESTIONNAIRE - PHQ9
SUM OF ALL RESPONSES TO PHQ QUESTIONS 1-9: 1
SUM OF ALL RESPONSES TO PHQ9 QUESTIONS 1 & 2: 1
2. FEELING DOWN, DEPRESSED OR HOPELESS: 0
SUM OF ALL RESPONSES TO PHQ QUESTIONS 1-9: 1
1. LITTLE INTEREST OR PLEASURE IN DOING THINGS: SEVERAL DAYS
SUM OF ALL RESPONSES TO PHQ QUESTIONS 1-9: 1
SUM OF ALL RESPONSES TO PHQ QUESTIONS 1-9: 1
SUM OF ALL RESPONSES TO PHQ9 QUESTIONS 1 & 2: 1
2. FEELING DOWN, DEPRESSED OR HOPELESS: NOT AT ALL
1. LITTLE INTEREST OR PLEASURE IN DOING THINGS: 1

## 2024-01-12 ENCOUNTER — OFFICE VISIT (OUTPATIENT)
Dept: FAMILY MEDICINE CLINIC | Age: 74
End: 2024-01-12

## 2024-01-12 VITALS
BODY MASS INDEX: 33.23 KG/M2 | TEMPERATURE: 97.4 F | WEIGHT: 245 LBS | DIASTOLIC BLOOD PRESSURE: 60 MMHG | SYSTOLIC BLOOD PRESSURE: 118 MMHG | HEART RATE: 64 BPM | OXYGEN SATURATION: 99 %

## 2024-01-12 DIAGNOSIS — E78.2 MIXED HYPERLIPIDEMIA: Chronic | ICD-10-CM

## 2024-01-12 DIAGNOSIS — E10.9 TYPE 1 DIABETES MELLITUS ON INSULIN THERAPY (HCC): ICD-10-CM

## 2024-01-12 DIAGNOSIS — R74.8 ELEVATED LIVER ENZYMES: ICD-10-CM

## 2024-01-12 DIAGNOSIS — M15.9 PRIMARY OSTEOARTHRITIS INVOLVING MULTIPLE JOINTS: ICD-10-CM

## 2024-01-12 DIAGNOSIS — L03.115 CELLULITIS OF RIGHT LOWER EXTREMITY: ICD-10-CM

## 2024-01-12 DIAGNOSIS — E10.29 MICROALBUMINURIA DUE TO TYPE 1 DIABETES MELLITUS (HCC): ICD-10-CM

## 2024-01-12 DIAGNOSIS — I48.0 PAROXYSMAL ATRIAL FIBRILLATION (HCC): ICD-10-CM

## 2024-01-12 DIAGNOSIS — R80.9 MICROALBUMINURIA DUE TO TYPE 1 DIABETES MELLITUS (HCC): ICD-10-CM

## 2024-01-12 DIAGNOSIS — D61.818 PANCYTOPENIA (HCC): ICD-10-CM

## 2024-01-12 DIAGNOSIS — E10.40 TYPE 1 DIABETES MELLITUS WITH DIABETIC NEUROPATHY, UNSPECIFIED (HCC): Primary | ICD-10-CM

## 2024-01-12 DIAGNOSIS — D69.6 THROMBOCYTOPENIA, UNSPECIFIED (HCC): ICD-10-CM

## 2024-01-12 DIAGNOSIS — G82.20 CHRONIC PROGRESSIVE PARAPARESIS (HCC): ICD-10-CM

## 2024-01-12 RX ORDER — LEVOFLOXACIN 500 MG/1
500 TABLET, FILM COATED ORAL DAILY
Qty: 7 TABLET | Refills: 0 | Status: SHIPPED | OUTPATIENT
Start: 2024-01-12 | End: 2024-01-19

## 2024-01-12 RX ORDER — HYDROCHLOROTHIAZIDE 12.5 MG/1
12.5 CAPSULE, GELATIN COATED ORAL DAILY
COMMUNITY
Start: 2023-10-24

## 2024-01-12 ASSESSMENT — ENCOUNTER SYMPTOMS
COUGH: 0
ALLERGIC/IMMUNOLOGIC NEGATIVE: 1
RESPIRATORY NEGATIVE: 1
GASTROINTESTINAL NEGATIVE: 1
DIARRHEA: 0
EYES NEGATIVE: 1
COLOR CHANGE: 1
SHORTNESS OF BREATH: 0

## 2024-01-12 NOTE — PROGRESS NOTES
Rubin Jefferson (: 1950) is a 73 y.o. male, Established patient, who presents today for:    Chief Complaint   Patient presents with    3 Month Follow-Up     DM review labs. Some possible fatigue in the evenings, blood sugars have been fine,     Leg Pain     Right leg is very red and sensitive to the touch. Leg is more swollen than the left at base line.   No afib in last year ,       Bradicardia     Chip monitor  recordin hr into 40's for 6 minuts looprecorder Dr    Follow-up of the following chronic conditions.Diabetes hypertension, hyperlipidemia.  reports  return of mild tenderness to right lower extremity there is some mild erythremia.  He had a temperature of 99 point 2 this morning.  History of chronic right leg cellulitis due to lymphedema.    ASSESSMENT/PLAN    1. Type 1 diabetes mellitus with diabetic neuropathy, unspecified  -     metFORMIN (GLUCOPHAGE) 1000 MG tablet; Take 1 tablet by mouth daily (with breakfast), Disp-180 tablet, R-0Normal  -     Insulin Syringes, Disposable, U-100 0.5 ML MISC; 2 times daily Starting 2024, Disp-200 each, R-5, [id number] ga needle  -     Comprehensive Metabolic Panel; Future  -     CBC with Auto Differential; Future  -     Hemoglobin A1C; Future  2. Chronic progressive paraparesis (HCC)    3. Pancytopenia (HCC)  Resolved platelet count stable the last 6 months.  Last CBC  platelets 162.  4. Paroxysmal atrial fibrillation (HCC)  Patient denies any symptoms.  Heart sounds are normal and regular.  Has loop recorder implanted.  No abnormal arrhythmias in the past years has had 1 episode of atrial fibrillation lasting for a few minutes in the past 3 years.  Patient is well-controlled continue current treatment plan.  5. Microalbuminuria due to type 1 diabetes mellitus (HCC)  Lab work shows A1c is at goal 7.4.  Modest reduction in GFR.  Will reduce metformin dosing to 1000 mg daily.  Adjustments will be made to insulin dosing. Will follow up with patient

## 2024-01-16 DIAGNOSIS — M15.9 PRIMARY OSTEOARTHRITIS INVOLVING MULTIPLE JOINTS: ICD-10-CM

## 2024-01-20 DIAGNOSIS — E10.42 POLYNEUROPATHY DUE TO TYPE 1 DIABETES MELLITUS (HCC): ICD-10-CM

## 2024-01-21 DIAGNOSIS — R60.0 EDEMA, LOWER EXTREMITY: ICD-10-CM

## 2024-01-21 NOTE — TELEPHONE ENCOUNTER
Comments:     Last Office Visit (last PCP visit):   1/12/2024    Next Visit Date:  No future appointments.    **If hasn't been seen in over a year OR hasn't followed up according to last diabetes/ADHD visit, make appointment for patient before sending refill to provider.    Rx requested:  Requested Prescriptions     Pending Prescriptions Disp Refills    pregabalin (LYRICA) 150 MG capsule 270 capsule 0     Sig: Take 1 capsule by mouth 3 times daily for 90 days.

## 2024-01-22 RX ORDER — PREGABALIN 150 MG/1
150 CAPSULE ORAL 3 TIMES DAILY
Qty: 270 CAPSULE | Refills: 0 | Status: SHIPPED | OUTPATIENT
Start: 2024-01-22 | End: 2024-04-21

## 2024-01-22 RX ORDER — HYDROCHLOROTHIAZIDE 12.5 MG/1
12.5 CAPSULE ORAL DAILY
Qty: 90 CAPSULE | Refills: 0 | Status: SHIPPED | OUTPATIENT
Start: 2024-01-22 | End: 2024-02-07 | Stop reason: WASHOUT

## 2024-01-26 ENCOUNTER — HOSPITAL ENCOUNTER (OUTPATIENT)
Dept: CARDIOLOGY | Facility: HOSPITAL | Age: 74
Discharge: HOME | End: 2024-01-26
Payer: MEDICARE

## 2024-01-26 DIAGNOSIS — I48.92 ATRIAL FIBRILLATION AND FLUTTER (MULTI): ICD-10-CM

## 2024-01-26 DIAGNOSIS — Z95.818 STATUS POST PLACEMENT OF IMPLANTABLE LOOP RECORDER: ICD-10-CM

## 2024-01-26 DIAGNOSIS — I48.91 ATRIAL FIBRILLATION AND FLUTTER (MULTI): ICD-10-CM

## 2024-01-26 PROCEDURE — 93298 REM INTERROG DEV EVAL SCRMS: CPT | Performed by: INTERNAL MEDICINE

## 2024-01-26 PROCEDURE — 93297 REM INTERROG DEV EVAL ICPMS: CPT

## 2024-02-07 ENCOUNTER — OFFICE VISIT (OUTPATIENT)
Dept: CARDIOLOGY | Facility: CLINIC | Age: 74
End: 2024-02-07
Payer: MEDICARE

## 2024-02-07 VITALS
SYSTOLIC BLOOD PRESSURE: 110 MMHG | BODY MASS INDEX: 34.38 KG/M2 | HEART RATE: 63 BPM | DIASTOLIC BLOOD PRESSURE: 70 MMHG | WEIGHT: 250 LBS

## 2024-02-07 DIAGNOSIS — R29.6 FREQUENT FALLS: ICD-10-CM

## 2024-02-07 DIAGNOSIS — E11.9 TYPE 2 DIABETES MELLITUS WITHOUT COMPLICATION, WITH LONG-TERM CURRENT USE OF INSULIN (MULTI): ICD-10-CM

## 2024-02-07 DIAGNOSIS — N18.31 CHRONIC KIDNEY DISEASE, STAGE 3A (MULTI): ICD-10-CM

## 2024-02-07 DIAGNOSIS — E87.5 HYPERKALEMIA: ICD-10-CM

## 2024-02-07 DIAGNOSIS — I10 PRIMARY HYPERTENSION: ICD-10-CM

## 2024-02-07 DIAGNOSIS — Z79.4 TYPE 2 DIABETES MELLITUS WITHOUT COMPLICATION, WITH LONG-TERM CURRENT USE OF INSULIN (MULTI): ICD-10-CM

## 2024-02-07 DIAGNOSIS — E78.2 MIXED HYPERLIPIDEMIA: ICD-10-CM

## 2024-02-07 PROCEDURE — 1159F MED LIST DOCD IN RCRD: CPT | Performed by: INTERNAL MEDICINE

## 2024-02-07 PROCEDURE — 3074F SYST BP LT 130 MM HG: CPT | Performed by: INTERNAL MEDICINE

## 2024-02-07 PROCEDURE — 93000 ELECTROCARDIOGRAM COMPLETE: CPT | Performed by: INTERNAL MEDICINE

## 2024-02-07 PROCEDURE — 4010F ACE/ARB THERAPY RXD/TAKEN: CPT | Performed by: INTERNAL MEDICINE

## 2024-02-07 PROCEDURE — 99214 OFFICE O/P EST MOD 30 MIN: CPT | Performed by: INTERNAL MEDICINE

## 2024-02-07 PROCEDURE — 3078F DIAST BP <80 MM HG: CPT | Performed by: INTERNAL MEDICINE

## 2024-02-07 RX ORDER — METOPROLOL SUCCINATE 50 MG/1
50 TABLET, EXTENDED RELEASE ORAL DAILY
Qty: 90 TABLET | Refills: 3 | Status: SHIPPED | OUTPATIENT
Start: 2024-02-07

## 2024-02-07 RX ORDER — DICLOFENAC SODIUM 50 MG/1
50 TABLET, DELAYED RELEASE ORAL 3 TIMES DAILY
COMMUNITY

## 2024-02-07 RX ORDER — ROSUVASTATIN CALCIUM 20 MG/1
TABLET, COATED ORAL
Qty: 90 TABLET | Refills: 3 | Status: SHIPPED | OUTPATIENT
Start: 2024-02-07

## 2024-02-07 NOTE — PROGRESS NOTES
This is a 1 year follow-up.    Subjective :     Reports several falls, all of which are mechanical.  For this reason patient is not on anticoagulation.  [place] of atrial fibrillation is followed through recorder interrogations.  Interval review of systems is negative for chest discomfort pressure tightness heaviness palpitations lightheadedness orthopnea paroxysmal nocturnal dyspnea dependent edema or claudication TIA or CVA type symptoms or bleeding diathesis      Objective   Failed to redirect to the Timeline version of the Delivery Agent SmartLink.   Wt Readings from Last 3 Encounters:   02/07/24 113 kg (250 lb)   03/07/23 119 kg (262 lb)   02/07/23 117 kg (259 lb)        Visit Vitals  /70 (BP Location: Left arm, Patient Position: Sitting)   Pulse 63   Wt 113 kg (250 lb)   BMI 34.38 kg/m²   BSA 2.39 m²            Physical Exam:    GENERAL APPEARANCE: in no acute distress.  CHEST: Symmetric and non-tender.  INTEGUMENT: Skin warm and dry  HEENT: No gross abnormalities identified.No pallor or scleral icterus.  NECK: Supple, no JVD, no bruit.   NEURO/PSHCY: Alert and oriented x3; appropriate behavior and responses and responses  LUNGS: Clear to auscultation bilaterally; normal respiratory effort.  HEART: Rate and rhythm regular with no evident murmur; no gallop appreciated.   ABDOMEN: Soft, non tender.  MUSCULOSKELETAL: No gross deformities.  EXTREMITIES: Warm  There is no edema noted.    Meds:  Current Outpatient Medications   Medication Instructions    aspirin 81 mg EC tablet Take 1 tablet daily    diclofenac (VOLTAREN) 50 mg, oral, 3 times daily, Do not crush, chew, or split.    docusate sodium (Colace) 100 mg capsule Take 4 capsules daily    DULoxetine (Cymbalta) 60 mg DR capsule Take 1 capsule daily. Do not crush or chew.    ezetimibe (ZETIA) 10 mg, oral, Weekly, Take 1 tablet daily    hydroCHLOROthiazide (MICROZIDE) 12.5 mg, oral, Daily    insulin lispro (HumaLOG U-100 Insulin) 100 unit/mL injection Use as direct  on sliding scale    insulin regular (NovoLIN R Regular U100 Insulin) 100 unit/mL injection Inject as directed    irbesartan (Avapro) 300 mg tablet Daily, Take 1 tablet daily    magnesium oxide (Mag-Ox) 400 mg tablet Take 1 tablet twice daily    metoprolol succinate XL (TOPROL-XL) 50 mg, oral, Daily    polyethylene glycol (Glycolax, Miralax) 17 gram packet Use as needed as directed    pregabalin (Lyrica) 150 mg capsule Take 1 capsule 3 times daily    rosuvastatin (Crestor) 20 mg tablet Take 1 tablet at bedtime          Allergies   Allergen Reactions    Pollen Extracts Unknown    Shellfish Derived Unknown    Vascepa [Icosapent Ethyl] Unknown    Iodine Rash     Reviewed hemoglobin A1c CBC comprehensive profile from January 2024    Hyperkalemia-potassium 5.9.  Being addressed by primary care.  Repeat blood work pending.  Patient's metformin has been discontinued, because he looked it up and found out that metformin can cause elevated potassium levels.  Elevated potassium rich foods, and says that his diet is becoming very restrictive.  He is scheduled to have repeat labs tomorrow.  I told him to discuss Lokelma with his primary MD.    LABS:    Lab Results   Component Value Date    WBC 4.1 (L) 03/16/2021    HGB 14.6 03/16/2021    HCT 43.8 03/16/2021     (L) 03/16/2021     02/01/2023    K 5.1 02/01/2023     02/01/2023    CREATININE 1.15 02/01/2023    BUN 30 (H) 02/01/2023    CO2 26 02/01/2023    INR 0.9 03/16/2021    HGBA1C 7.4 (H) 01/09/2024                       Patient Active Problem List    Diagnosis Date Noted    Chronic kidney disease, stage 3a (CMS/[facility]) 02/07/2024    Abnormal EKG 11/17/2023    CKD (chronic kidney disease) 11/17/2023    Abnormal echocardiogram 11/17/2023    Edema, lower extremity 11/17/2023    Frequent falls 11/17/2023    High risk medication use 11/17/2023    Hyperkalemia 11/17/2023    Hyperlipemia 11/17/2023    Hypertension 11/17/2023    PAF (paroxysmal atrial fibrillation)  (CMS/[facility]) 11/17/2023    Peripheral sensory neuropathy 11/17/2023    Paroxysmal supraventricular tachycardia 11/17/2023    Right bundle branch block with left anterior fascicular block 11/17/2023    Shortness of breath 11/17/2023    Diabetes mellitus (CMS/[facility]) 11/17/2023                 Assessment:      1. Hypertension-at target  2. Diabetes mellitus-reasonable control no symptoms of hypoglycemia, probable diabetic neuropathy  3. High risk of fall, no major injuries  4. Loop recorder in place, had some atrial fibrillation few months ago, lately no atrial fibrillation, followed closely by EP service  5. Hyperlipidemia-characterized now by hypertriglyceridemia, which is mostly related to his diabetes  6. Lipid profile is at target  7. Asymmetric right lower extremity edema  8. Shortness of breath , possibly related to deconditioning and or diastolic heart failure differential diagnosis does include anginal equivalent, additional recommendations based on clinical course and findings of initial testing  9. Echocardiogram 2020-LVEF 60% left atrial diameter 3.6 cm trace tricuspid regurgitation RVSP 29 mmHg, this is unchanged compared to a report from 2014  10. Cardiac catheterization 2004-20 to 30% proximal LAD disease otherwise tortuous vessels without disease, LVEF preserved LVEDP 8 to 10 mmHg no systolic gradient across the aortic valve, renal arteries widely patent   11. 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 Get another differential diagnosis for shortness of breath is chronotropic blunting. Patient is on metoprolol succinate 50 mg daily.  12.  Hyperkalemia-patient has discontinued metformin, hyperkalemia is being addressed by primary care, we talked to him about Lokelma, cost may be prohibitive  And EKG was done today, no evidence of hyperkalemia changes on EKG. patient also discontinued hydrochlorothiazide 12.5 mg daily, this could also be  contributing to his hyperkalemia      Recommendations:  1.  No change in cardiac medications  2.  EP comanaging.  Follow up : 1 year        Provider Attestation - Scribe documentation    All medical record entries made by the Scribe were at my direction and personally dictated by me. I have reviewed the chart and agree that the record accurately reflects my personal performance of the history, physical exam, discussion and plan.

## 2024-02-09 ENCOUNTER — HOSPITAL ENCOUNTER (OUTPATIENT)
Dept: LAB | Age: 74
Discharge: HOME | End: 2024-02-09

## 2024-02-09 ENCOUNTER — HOSPITAL ENCOUNTER (OUTPATIENT)
Dept: CARDIOLOGY | Facility: HOSPITAL | Age: 74
Discharge: HOME | End: 2024-02-09
Payer: MEDICARE

## 2024-02-09 DIAGNOSIS — I48.91 ATRIAL FIBRILLATION AND FLUTTER (MULTI): ICD-10-CM

## 2024-02-09 DIAGNOSIS — I48.92 ATRIAL FIBRILLATION AND FLUTTER (MULTI): ICD-10-CM

## 2024-02-09 DIAGNOSIS — Z95.818 STATUS POST PLACEMENT OF IMPLANTABLE LOOP RECORDER: ICD-10-CM

## 2024-02-12 DIAGNOSIS — R74.8 ELEVATED LIVER ENZYMES: ICD-10-CM

## 2024-02-12 DIAGNOSIS — E87.5 HYPERKALEMIA: Primary | ICD-10-CM

## 2024-02-14 ENCOUNTER — HOSPITAL ENCOUNTER (OUTPATIENT)
Dept: CARDIOLOGY | Facility: HOSPITAL | Age: 74
Discharge: HOME | End: 2024-02-14
Payer: MEDICARE

## 2024-02-14 ENCOUNTER — OFFICE VISIT (OUTPATIENT)
Dept: CARDIOLOGY | Facility: CLINIC | Age: 74
End: 2024-02-14
Payer: MEDICARE

## 2024-02-14 VITALS
WEIGHT: 250 LBS | HEART RATE: 74 BPM | SYSTOLIC BLOOD PRESSURE: 132 MMHG | DIASTOLIC BLOOD PRESSURE: 66 MMHG | HEIGHT: 72 IN | BODY MASS INDEX: 33.86 KG/M2

## 2024-02-14 DIAGNOSIS — Z79.899 HIGH RISK MEDICATION USE: ICD-10-CM

## 2024-02-14 DIAGNOSIS — Z95.818 PRESENCE OF CARDIAC DEVICE: ICD-10-CM

## 2024-02-14 DIAGNOSIS — I48.0 PAF (PAROXYSMAL ATRIAL FIBRILLATION) (MULTI): ICD-10-CM

## 2024-02-14 DIAGNOSIS — I48.0 PAROXYSMAL ATRIAL FIBRILLATION (MULTI): ICD-10-CM

## 2024-02-14 DIAGNOSIS — Z95.818 STATUS POST PLACEMENT OF IMPLANTABLE LOOP RECORDER: Primary | ICD-10-CM

## 2024-02-14 DIAGNOSIS — R94.31 ABNORMAL EKG: ICD-10-CM

## 2024-02-14 DIAGNOSIS — I10 PRIMARY HYPERTENSION: ICD-10-CM

## 2024-02-14 DIAGNOSIS — I47.10 PAROXYSMAL SUPRAVENTRICULAR TACHYCARDIA (CMS-HCC): ICD-10-CM

## 2024-02-14 DIAGNOSIS — Z78.9 NEVER SMOKED CIGARETTES: ICD-10-CM

## 2024-02-14 PROCEDURE — 4010F ACE/ARB THERAPY RXD/TAKEN: CPT | Performed by: INTERNAL MEDICINE

## 2024-02-14 PROCEDURE — 93291 INTERROG DEV EVAL SCRMS IP: CPT

## 2024-02-14 PROCEDURE — 3075F SYST BP GE 130 - 139MM HG: CPT | Performed by: INTERNAL MEDICINE

## 2024-02-14 PROCEDURE — 1036F TOBACCO NON-USER: CPT | Performed by: INTERNAL MEDICINE

## 2024-02-14 PROCEDURE — 93291 INTERROG DEV EVAL SCRMS IP: CPT | Performed by: INTERNAL MEDICINE

## 2024-02-14 PROCEDURE — 99214 OFFICE O/P EST MOD 30 MIN: CPT | Performed by: INTERNAL MEDICINE

## 2024-02-14 PROCEDURE — 3078F DIAST BP <80 MM HG: CPT | Performed by: INTERNAL MEDICINE

## 2024-02-14 PROCEDURE — 1159F MED LIST DOCD IN RCRD: CPT | Performed by: INTERNAL MEDICINE

## 2024-02-14 ASSESSMENT — ENCOUNTER SYMPTOMS
DYSPNEA ON EXERTION: 0
PALPITATIONS: 0

## 2024-02-14 NOTE — PROGRESS NOTES
CARDIOLOGY OFFICE VISIT      CHIEF COMPLAINT  Chief Complaint   Patient presents with    Follow-up     6 month with device check       HISTORY OF PRESENT ILLNESS  HPI    74-year-old  male who is followed for paroxysmal atrial fibrillation controlled on metoprolol and magnesium oxide and not anticoagulated due to low arrhythmic burden, status post loop recorder implant on March 16, 2021 for evaluation of arrhythmic burden. Additionally the patient is followed for hypertension, dyslipidemia, and diabetes mellitus.    Since last office visit, he has been doing well.  He denies any symptoms of chest pain or shortness of breath or palpitations.    I have personally reviewed device interrogation since the last office visit and so far loop recorder has not seen any significant atrial or ventricular arrhythmias.        Past Medical History  History reviewed. No pertinent past medical history.    Social History  Social History     Tobacco Use    Smoking status: Never    Smokeless tobacco: Never   Substance Use Topics    Alcohol use: Yes     Comment: occasional    Drug use: Never       Family History   No family history on file.     Allergies:  Allergies   Allergen Reactions    Pollen Extracts Unknown    Shellfish Derived Unknown    Vascepa [Icosapent Ethyl] Unknown    Iodine Rash        Outpatient Medications:  Current Outpatient Medications   Medication Instructions    aspirin 81 mg EC tablet Take 1 tablet daily    diclofenac (VOLTAREN) 50 mg, oral, 3 times daily, Do not crush, chew, or split.    docusate sodium (Colace) 100 mg capsule Take 4 capsules daily    DULoxetine (Cymbalta) 60 mg DR capsule Take 1 capsule daily. Do not crush or chew.    ezetimibe (ZETIA) 10 mg, oral, Weekly, Take 1 tablet daily    insulin lispro (HumaLOG U-100 Insulin) 100 unit/mL injection Use as direct on sliding scale    insulin regular (NovoLIN R Regular U100 Insulin) 100 unit/mL injection Inject as directed    irbesartan (Avapro) 300  mg tablet Daily, Take 1 tablet daily    magnesium oxide (Mag-Ox) 400 mg tablet Take 1 tablet twice daily    metoprolol succinate XL (TOPROL-XL) 50 mg, oral, Daily    polyethylene glycol (Glycolax, Miralax) 17 gram packet Use as needed as directed    pregabalin (Lyrica) 150 mg capsule Take 1 capsule 3 times daily    rosuvastatin (Crestor) 20 mg tablet Take 1 tablet at bedtime          REVIEW OF SYSTEMS  Review of Systems   Cardiovascular:  Negative for chest pain, dyspnea on exertion and palpitations.   All other systems reviewed and are negative.        VITALS  Vitals:    02/14/24 1359   BP: 132/66   Pulse: 74       PHYSICAL EXAM  Constitutional:       General: Awake.      Appearance: Normal and healthy appearance. Well-developed and not in distress.   Neck:      Vascular: No JVR. JVD normal.   Pulmonary:      Effort: Pulmonary effort is normal.      Breath sounds: Normal breath sounds. No wheezing. No rhonchi. No rales.   Chest:      Chest wall: Not tender to palpatation.      Comments: Loop recorder in place   Cardiovascular:      PMI at left midclavicular line. Normal rate. Regular rhythm. Normal S1. Normal S2.       Murmurs: There is no murmur.      No gallop.  No click. No rub.   Pulses:     Intact distal pulses.   Edema:     Peripheral edema absent.   Abdominal:      Tenderness: There is no abdominal tenderness.   Musculoskeletal: Normal range of motion.         General: No tenderness. Skin:     General: Skin is warm and dry.   Neurological:      General: No focal deficit present.      Mental Status: Alert and oriented to person, place and time.           ASSESSMENT AND PLAN      Clinical impressions:  1. Paroxysmal atrial fibrillation controlled on beta-blockade and presently not anticoagulated due to low arrhythmic burden with device interrogation today revealing a burden of 0%.  2. Spinal stenosis with history of falls.  3. Hypertension, controlled  4. Dyslipidemia on statin.  5. Diabetes mellitus type  1.  6. Hyperdynamic ejection fraction at 75% per 2D echocardiogram dated February 20, 2023.  7. Loop recorder implant ([employer] reveal Linq) on March 16, 2021 for evaluation of arrhythmic burden.  8. Class I obesity with a BMI of 34.66.  9. Pulmonary hypertension with right ventricular systolic pressure 47 mmHg per 2D echocardiogram dated February 20, 2023.    Plan-recommendations    From the electrophysiologist on point he is doing well.  Continue following with office every 6 months or sooner needed.    Follow device clinic as scheduled.      Risk factor modification and lifestyle modification discussed with patient. Diet , exercise and hydration discussed with patient.    I have personally review with patient during this office visit, laboratory data, echocardiogram results, stress test results, Holter-event monitor results prior and after the last electrophysiology visit. All questions has been answered.    Please excuse any errors in grammar or translation related to this dictation.  Voice recognition software was utilized to prepare this document.

## 2024-02-14 NOTE — PATIENT INSTRUCTIONS
Continue same medications/treatment.  Patient educated on proper medication use.  Patient educated on risk factor modification.  Please bring any lab results from other providers/physicians to your next appointment.    Please bring all medicines, vitamins, and herbal supplements with you when you come to the office.    Prescriptions will not be filled unless you are compliant with your follow up appointments or have a follow up appointment scheduled as per instruction of your physician. Refills should be requested at the time of your visit.    Follow up with [facility] in 6 months with device check  Continue monthly remote checks     TIFFANY CARDOZO RN, AM SCRIBING FOR, AND IN THE PRESENCE OF DR. LOGAN LOPEZ MD

## 2024-02-16 ENCOUNTER — HOSPITAL ENCOUNTER (OUTPATIENT)
Dept: LAB | Age: 74
Discharge: HOME | End: 2024-02-16
Payer: MEDICARE

## 2024-02-16 LAB
ALBUMIN SERPL-MCNC: 4.1 G/DL (ref 3.5–4.6)
ALP SERPL-CCNC: 76 U/L (ref 35–104)
ALT SERPL-CCNC: 17 U/L (ref 0–41)
ANION GAP SERPL CALCULATED.3IONS-SCNC: 9 MEQ/L (ref 9–15)
AST SERPL-CCNC: 17 U/L (ref 0–40)
BILIRUB SERPL-MCNC: 0.3 MG/DL (ref 0.2–0.7)
BUN SERPL-MCNC: 22 MG/DL (ref 8–23)
CALCIUM SERPL-MCNC: 9.2 MG/DL (ref 8.5–9.9)
CHLORIDE SERPL-SCNC: 107 MEQ/L (ref 95–107)
CO2 SERPL-SCNC: 25 MEQ/L (ref 20–31)
CREAT SERPL-MCNC: 1.09 MG/DL (ref 0.7–1.2)
GLOBULIN SER CALC-MCNC: 2.5 G/DL (ref 2.3–3.5)
GLUCOSE SERPL-MCNC: 90 MG/DL (ref 70–99)
POTASSIUM SERPL-SCNC: 4.9 MEQ/L (ref 3.4–4.9)
PROT SERPL-MCNC: 6.6 G/DL (ref 6.3–8)
SODIUM SERPL-SCNC: 141 MEQ/L (ref 135–144)

## 2024-02-16 PROCEDURE — 80053 COMPREHEN METABOLIC PANEL: CPT

## 2024-02-16 PROCEDURE — 36415 COLL VENOUS BLD VENIPUNCTURE: CPT

## 2024-03-22 ENCOUNTER — HOSPITAL ENCOUNTER (OUTPATIENT)
Dept: CARDIOLOGY | Facility: HOSPITAL | Age: 74
Discharge: HOME | End: 2024-03-22
Payer: MEDICARE

## 2024-03-22 DIAGNOSIS — Z95.818 STATUS POST PLACEMENT OF IMPLANTABLE LOOP RECORDER: ICD-10-CM

## 2024-03-22 PROCEDURE — 93298 REM INTERROG DEV EVAL SCRMS: CPT | Performed by: INTERNAL MEDICINE

## 2024-03-22 PROCEDURE — 93298 REM INTERROG DEV EVAL SCRMS: CPT

## 2024-03-24 DIAGNOSIS — M15.9 PRIMARY OSTEOARTHRITIS INVOLVING MULTIPLE JOINTS: ICD-10-CM

## 2024-03-24 DIAGNOSIS — E10.42 POLYNEUROPATHY DUE TO TYPE 1 DIABETES MELLITUS (HCC): ICD-10-CM

## 2024-03-24 NOTE — TELEPHONE ENCOUNTER
Comments: pt asking for increase of voltaren pt would like TID dosing.     Last Office Visit (last PCP visit):   1/12/2024    Next Visit Date:  No future appointments.    **If hasn't been seen in over a year OR hasn't followed up according to last diabetes/ADHD visit, make appointment for patient before sending refill to provider.    Rx requested:  Requested Prescriptions     Pending Prescriptions Disp Refills    diclofenac (VOLTAREN) 50 MG EC tablet 90 tablet 0     Sig: Take 1 tablet by mouth 2 times daily    pregabalin (LYRICA) 150 MG capsule 270 capsule 0     Sig: Take 1 capsule by mouth 3 times daily for 90 days.

## 2024-03-26 RX ORDER — PREGABALIN 150 MG/1
150 CAPSULE ORAL 3 TIMES DAILY
Qty: 270 CAPSULE | Refills: 0 | OUTPATIENT
Start: 2024-03-26 | End: 2024-06-24

## 2024-04-04 DIAGNOSIS — E53.8 VITAMIN B12 DEFICIENCY: Primary | ICD-10-CM

## 2024-04-05 ENCOUNTER — HOSPITAL ENCOUNTER (OUTPATIENT)
Dept: LAB | Age: 74
Discharge: HOME | End: 2024-04-05
Payer: MEDICARE

## 2024-04-05 DIAGNOSIS — E10.40 TYPE 1 DIABETES MELLITUS WITH DIABETIC NEUROPATHY, UNSPECIFIED (HCC): ICD-10-CM

## 2024-04-05 DIAGNOSIS — E87.5 HYPERKALEMIA: ICD-10-CM

## 2024-04-05 DIAGNOSIS — E53.8 VITAMIN B12 DEFICIENCY: ICD-10-CM

## 2024-04-05 DIAGNOSIS — R74.8 ELEVATED LIVER ENZYMES: ICD-10-CM

## 2024-04-05 DIAGNOSIS — E78.2 MIXED HYPERLIPIDEMIA: Chronic | ICD-10-CM

## 2024-04-05 LAB
ALBUMIN SERPL-MCNC: 4 G/DL (ref 3.5–4.6)
ALP SERPL-CCNC: 76 U/L (ref 35–104)
ALT SERPL-CCNC: 18 U/L (ref 0–41)
ANION GAP SERPL CALCULATED.3IONS-SCNC: 9 MEQ/L (ref 9–15)
AST SERPL-CCNC: 15 U/L (ref 0–40)
BASOPHILS # BLD: 0 K/UL (ref 0–0.2)
BASOPHILS NFR BLD: 1.1 %
BILIRUB SERPL-MCNC: 0.4 MG/DL (ref 0.2–0.7)
BUN SERPL-MCNC: 18 MG/DL (ref 8–23)
CALCIUM SERPL-MCNC: 9.5 MG/DL (ref 8.5–9.9)
CHLORIDE SERPL-SCNC: 101 MEQ/L (ref 95–107)
CHOLEST SERPL-MCNC: 129 MG/DL (ref 0–199)
CO2 SERPL-SCNC: 26 MEQ/L (ref 20–31)
CREAT SERPL-MCNC: 0.98 MG/DL (ref 0.7–1.2)
EOSINOPHIL # BLD: 0.2 K/UL (ref 0–0.7)
EOSINOPHIL NFR BLD: 4.8 %
ERYTHROCYTE [DISTWIDTH] IN BLOOD BY AUTOMATED COUNT: 13.9 % (ref 11.5–14.5)
ESTIMATED AVERAGE GLUCOSE: 154 MG/DL
FOLATE: 7.8 NG/ML (ref 4.8–24.2)
GLOBULIN SER CALC-MCNC: 2.6 G/DL (ref 2.3–3.5)
GLUCOSE SERPL-MCNC: 183 MG/DL (ref 70–99)
HBA1C MFR BLD: 7 % (ref 4–6)
HCT VFR BLD AUTO: 41.2 % (ref 42–52)
HDLC SERPL-MCNC: 48 MG/DL (ref 40–59)
HGB BLD-MCNC: 13.7 G/DL (ref 14–18)
LDLC SERPL CALC-MCNC: 61 MG/DL (ref 0–129)
LYMPHOCYTES # BLD: 1.1 K/UL (ref 1–4.8)
LYMPHOCYTES NFR BLD: 28.2 %
MCH RBC QN AUTO: 31.9 PG (ref 27–31.3)
MCHC RBC AUTO-ENTMCNC: 33.3 % (ref 33–37)
MCV RBC AUTO: 96 FL (ref 79–92.2)
MONOCYTES # BLD: 0.4 K/UL (ref 0.2–0.8)
MONOCYTES NFR BLD: 9.4 %
NEUTROPHILS # BLD: 2.1 K/UL (ref 1.4–6.5)
NEUTS SEG NFR BLD: 56.2 %
PLATELET # BLD AUTO: 160 K/UL (ref 130–400)
PLATELET BLD QL SMEAR: ADEQUATE
POTASSIUM SERPL-SCNC: 5 MEQ/L (ref 3.4–4.9)
PROT SERPL-MCNC: 6.6 G/DL (ref 6.3–8)
RBC # BLD AUTO: 4.29 M/UL (ref 4.7–6.1)
RBC MORPH BLD: NORMAL
SODIUM SERPL-SCNC: 136 MEQ/L (ref 135–144)
TRIGL SERPL-MCNC: 98 MG/DL (ref 0–150)
VITAMIN B-12: 963 PG/ML (ref 232–1245)
WBC # BLD AUTO: 3.7 K/UL (ref 4.8–10.8)

## 2024-04-05 PROCEDURE — 36415 COLL VENOUS BLD VENIPUNCTURE: CPT

## 2024-04-05 PROCEDURE — 82607 VITAMIN B-12: CPT

## 2024-04-05 PROCEDURE — 83036 HEMOGLOBIN GLYCOSYLATED A1C: CPT

## 2024-04-05 PROCEDURE — 80061 LIPID PANEL: CPT

## 2024-04-05 PROCEDURE — 85025 COMPLETE CBC W/AUTO DIFF WBC: CPT

## 2024-04-05 PROCEDURE — 80053 COMPREHEN METABOLIC PANEL: CPT

## 2024-04-05 PROCEDURE — 82746 ASSAY OF FOLIC ACID SERUM: CPT

## 2024-04-18 DIAGNOSIS — I10 PRIMARY HYPERTENSION: ICD-10-CM

## 2024-04-18 RX ORDER — DULOXETIN HYDROCHLORIDE 60 MG/1
60 CAPSULE, DELAYED RELEASE ORAL DAILY
Qty: 90 CAPSULE | Refills: 1 | Status: SHIPPED | OUTPATIENT
Start: 2024-04-18

## 2024-04-18 RX ORDER — IRBESARTAN 300 MG/1
300 TABLET ORAL DAILY
Qty: 90 TABLET | Refills: 3 | Status: SHIPPED | OUTPATIENT
Start: 2024-04-18 | End: 2025-04-18

## 2024-04-18 SDOH — ECONOMIC STABILITY: FOOD INSECURITY: WITHIN THE PAST 12 MONTHS, THE FOOD YOU BOUGHT JUST DIDN'T LAST AND YOU DIDN'T HAVE MONEY TO GET MORE.: NEVER TRUE

## 2024-04-18 SDOH — ECONOMIC STABILITY: FOOD INSECURITY: WITHIN THE PAST 12 MONTHS, YOU WORRIED THAT YOUR FOOD WOULD RUN OUT BEFORE YOU GOT MONEY TO BUY MORE.: NEVER TRUE

## 2024-04-18 SDOH — ECONOMIC STABILITY: INCOME INSECURITY: HOW HARD IS IT FOR YOU TO PAY FOR THE VERY BASICS LIKE FOOD, HOUSING, MEDICAL CARE, AND HEATING?: NOT VERY HARD

## 2024-04-18 SDOH — ECONOMIC STABILITY: TRANSPORTATION INSECURITY
IN THE PAST 12 MONTHS, HAS LACK OF TRANSPORTATION KEPT YOU FROM MEETINGS, WORK, OR FROM GETTING THINGS NEEDED FOR DAILY LIVING?: NO

## 2024-04-18 NOTE — TELEPHONE ENCOUNTER
Comments:     Last Office Visit (last PCP visit):   1/12/2024    Next Visit Date:  Future Appointments   Date Time Provider Department Center   4/19/2024  8:15 AM David Olmos, APRN - CNP [facility]       **If hasn't been seen in over a year OR hasn't followed up according to last diabetes/ADHD visit, make appointment for patient before sending refill to provider.    Rx requested:  Requested Prescriptions     Pending Prescriptions Disp Refills    DULoxetine (CYMBALTA) 60 MG extended release capsule [Pharmacy Med Name: DULoxetine HCl 60 MG Oral Capsule Delayed Release Particles] 90 capsule 0     Sig: Take 1 capsule by mouth once daily

## 2024-04-19 ENCOUNTER — OFFICE VISIT (OUTPATIENT)
Dept: FAMILY MEDICINE CLINIC | Age: 74
End: 2024-04-19
Payer: MEDICARE

## 2024-04-19 VITALS
OXYGEN SATURATION: 97 % | WEIGHT: 247 LBS | DIASTOLIC BLOOD PRESSURE: 62 MMHG | BODY MASS INDEX: 33.5 KG/M2 | TEMPERATURE: 96.9 F | HEART RATE: 64 BPM | SYSTOLIC BLOOD PRESSURE: 124 MMHG

## 2024-04-19 DIAGNOSIS — E10.42 POLYNEUROPATHY DUE TO TYPE 1 DIABETES MELLITUS (HCC): Primary | ICD-10-CM

## 2024-04-19 DIAGNOSIS — E10.9 TYPE 1 DIABETES MELLITUS ON INSULIN THERAPY (HCC): ICD-10-CM

## 2024-04-19 PROCEDURE — 1123F ACP DISCUSS/DSCN MKR DOCD: CPT

## 2024-04-19 PROCEDURE — 3074F SYST BP LT 130 MM HG: CPT

## 2024-04-19 PROCEDURE — 3078F DIAST BP <80 MM HG: CPT

## 2024-04-19 PROCEDURE — 3051F HG A1C>EQUAL 7.0%<8.0%: CPT

## 2024-04-19 PROCEDURE — 99214 OFFICE O/P EST MOD 30 MIN: CPT

## 2024-04-19 RX ORDER — PREGABALIN 150 MG/1
300 CAPSULE ORAL 2 TIMES DAILY
Qty: 360 CAPSULE | Refills: 0 | Status: SHIPPED | OUTPATIENT
Start: 2024-04-19 | End: 2024-07-18

## 2024-04-19 NOTE — PROGRESS NOTES
(N/A, 03/27/2020); Cataract removal (Right, 12/17/2021); and eye surgery (2013 & 2021).  CURRENT MEDICATIONS       Previous Medications    ACETAMINOPHEN (TYLENOL) 500 MG TABLET    Take 2 tablets by mouth every 6 hours as needed for Pain    ASPIRIN 81 MG TABLET    Take 1 tablet by mouth daily    BLOOD GLUCOSE TEST STRIPS (ONETOUCH VERIO) STRIP    USE 1 STRIP TO CHECK GLUCOSE FIVE TIMES DAILY AS INSTRUCTED    CICLOPIROX (LOPROX) 0.77 % CREAM    Apply topically 2 times daily.    CONTINUOUS BLOOD GLUC  (FREESTYLE NUBIA 14 DAY READER) XU    Please give 1 reader    CONTINUOUS BLOOD GLUC SENSOR (FREESTYLE NUBIA 14 DAY SENSOR) MISC    1 each by Does not apply route every 14 days    CYANOCOBALAMIN 1000 MCG SUBL    Place 1 tablet under the tongue daily    DICLOFENAC (VOLTAREN) 50 MG EC TABLET    Take 1 tablet by mouth 3 times daily    DOCUSATE SODIUM (COLACE PO)    Take 400 mg by mouth every evening     DULOXETINE (CYMBALTA) 60 MG EXTENDED RELEASE CAPSULE    Take 1 capsule by mouth once daily    EZETIMIBE (ZETIA) 10 MG TABLET    Take 1 tablet by mouth daily Indications: takes 1-2 times per week    INSULIN NPH (NOVOLIN N) 100 UNIT/ML INJECTION VIAL    30 units before breakfast, 40 units with supper, 15 units at bed time    INSULIN SYRINGES, DISPOSABLE, U-100 0.5 ML MISC    1 each by Does not apply route in the morning and at bedtime 31 ga needle    IRBESARTAN (AVAPRO) 300 MG TABLET    Take 1 tablet by mouth once daily    LANCETS MISC    Test five times daily.    MAGNESIUM OXIDE (MAG-OX) 400 MG TABLET    Take by mouth    METOPROLOL SUCCINATE (TOPROL XL) 50 MG EXTENDED RELEASE TABLET    Take 1 tablet by mouth nightly    MIRABEGRON (MYRBETRIQ) 50 MG TB24    Take 50 mg by mouth daily    NOVOLIN R 100 UNIT/ML INJECTION    INJECT SUBCUTANEOUSLY THREE TIMES DAILY UP  UNITS  AS NEEDED    NOVOLOG 100 UNIT/ML INJECTION VIAL    INJECT 25 UNITS SUBCUTANEOUSLY THREE TIMES DAILY BEFORE MEAL(S), SLIDING SCALE  IN THE MORNING

## 2024-04-26 ENCOUNTER — HOSPITAL ENCOUNTER (OUTPATIENT)
Dept: CARDIOLOGY | Facility: HOSPITAL | Age: 74
Discharge: HOME | End: 2024-04-26
Payer: MEDICARE

## 2024-04-26 DIAGNOSIS — I48.91 ATRIAL FIBRILLATION AND FLUTTER (MULTI): ICD-10-CM

## 2024-04-26 DIAGNOSIS — Z95.818 STATUS POST PLACEMENT OF IMPLANTABLE LOOP RECORDER: ICD-10-CM

## 2024-04-26 DIAGNOSIS — I48.92 ATRIAL FIBRILLATION AND FLUTTER (MULTI): ICD-10-CM

## 2024-04-26 PROCEDURE — 93298 REM INTERROG DEV EVAL SCRMS: CPT

## 2024-04-26 PROCEDURE — 93298 REM INTERROG DEV EVAL SCRMS: CPT | Performed by: INTERNAL MEDICINE

## 2024-05-19 DIAGNOSIS — M15.9 PRIMARY OSTEOARTHRITIS INVOLVING MULTIPLE JOINTS: ICD-10-CM

## 2024-05-19 NOTE — TELEPHONE ENCOUNTER
Comments:     Last Office Visit (last PCP visit):   4/19/2024    Next Visit Date:  No future appointments.      Rx requested:  Requested Prescriptions     Pending Prescriptions Disp Refills    diclofenac (VOLTAREN) 50 MG EC tablet [Pharmacy Med Name: Diclofenac Sodium 50 MG Oral Tablet Delayed Release] 90 tablet 0     Sig: TAKE 1 TABLET BY MOUTH THREE TIMES DAILY

## 2024-05-31 ENCOUNTER — HOSPITAL ENCOUNTER (OUTPATIENT)
Dept: CARDIOLOGY | Facility: HOSPITAL | Age: 74
Discharge: HOME | End: 2024-05-31
Payer: MEDICARE

## 2024-05-31 DIAGNOSIS — I48.92 ATRIAL FIBRILLATION AND FLUTTER (MULTI): ICD-10-CM

## 2024-05-31 DIAGNOSIS — Z95.818 STATUS POST PLACEMENT OF IMPLANTABLE LOOP RECORDER: ICD-10-CM

## 2024-05-31 DIAGNOSIS — I48.91 ATRIAL FIBRILLATION AND FLUTTER (MULTI): ICD-10-CM

## 2024-05-31 PROCEDURE — 93298 REM INTERROG DEV EVAL SCRMS: CPT | Performed by: INTERNAL MEDICINE

## 2024-05-31 PROCEDURE — 93298 REM INTERROG DEV EVAL SCRMS: CPT

## 2024-06-21 DIAGNOSIS — M15.9 PRIMARY OSTEOARTHRITIS INVOLVING MULTIPLE JOINTS: ICD-10-CM

## 2024-06-23 DIAGNOSIS — M15.9 PRIMARY OSTEOARTHRITIS INVOLVING MULTIPLE JOINTS: ICD-10-CM

## 2024-06-24 DIAGNOSIS — E10.40 TYPE 1 DIABETES MELLITUS WITH DIABETIC NEUROPATHY, UNSPECIFIED (HCC): Primary | ICD-10-CM

## 2024-06-24 DIAGNOSIS — E10.40 TYPE 1 DIABETES MELLITUS WITH DIABETIC NEUROPATHY, UNSPECIFIED (HCC): ICD-10-CM

## 2024-06-24 RX ORDER — BLOOD-GLUCOSE METER
1 EACH MISCELLANEOUS DAILY
Qty: 1 KIT | Refills: 0 | Status: SHIPPED | OUTPATIENT
Start: 2024-06-24

## 2024-06-24 RX ORDER — BLOOD-GLUCOSE METER
EACH MISCELLANEOUS
Refills: 0 | OUTPATIENT
Start: 2024-06-24

## 2024-06-24 RX ORDER — BLOOD-GLUCOSE METER
EACH MISCELLANEOUS
Qty: 1 KIT | OUTPATIENT
Start: 2024-06-24

## 2024-06-28 ENCOUNTER — TELEPHONE (OUTPATIENT)
Dept: FAMILY MEDICINE CLINIC | Age: 74
End: 2024-06-28

## 2024-06-28 DIAGNOSIS — E10.40 TYPE 1 DIABETES MELLITUS WITH DIABETIC NEUROPATHY, UNSPECIFIED (HCC): Primary | ICD-10-CM

## 2024-06-28 RX ORDER — BLOOD-GLUCOSE SENSOR
1 EACH MISCELLANEOUS
Qty: 2 EACH | Refills: 5 | Status: SHIPPED | OUTPATIENT
Start: 2024-06-28

## 2024-06-28 RX ORDER — KETOROLAC TROMETHAMINE 30 MG/ML
1 INJECTION, SOLUTION INTRAMUSCULAR; INTRAVENOUS DAILY
Qty: 1 EACH | Refills: 0 | Status: SHIPPED | OUTPATIENT
Start: 2024-06-28

## 2024-06-28 NOTE — TELEPHONE ENCOUNTER
Pharmacy called in regards to the freestyle Chandu Sensor that was called in. They are no longer able to get that in and would recommend Freestyle Chandu 2 or 3, along with the reader and strips. Please advise  
Name band;

## 2024-07-12 ENCOUNTER — HOSPITAL ENCOUNTER (OUTPATIENT)
Dept: CARDIOLOGY | Facility: HOSPITAL | Age: 74
Discharge: HOME | End: 2024-07-12
Payer: MEDICARE

## 2024-07-12 DIAGNOSIS — Z95.818 STATUS POST PLACEMENT OF IMPLANTABLE LOOP RECORDER: ICD-10-CM

## 2024-07-12 DIAGNOSIS — I48.92 ATRIAL FIBRILLATION AND FLUTTER (MULTI): ICD-10-CM

## 2024-07-12 DIAGNOSIS — I48.91 ATRIAL FIBRILLATION AND FLUTTER (MULTI): ICD-10-CM

## 2024-07-12 PROCEDURE — 93298 REM INTERROG DEV EVAL SCRMS: CPT

## 2024-07-16 DIAGNOSIS — Z12.5 SCREENING FOR PROSTATE CANCER: Primary | ICD-10-CM

## 2024-07-17 ENCOUNTER — HOSPITAL ENCOUNTER (OUTPATIENT)
Dept: LAB | Age: 74
Discharge: HOME | End: 2024-07-17
Payer: MEDICARE

## 2024-07-17 DIAGNOSIS — E10.9 TYPE 1 DIABETES MELLITUS ON INSULIN THERAPY (HCC): ICD-10-CM

## 2024-07-17 DIAGNOSIS — Z12.5 SCREENING FOR PROSTATE CANCER: ICD-10-CM

## 2024-07-17 LAB
ALBUMIN SERPL-MCNC: 3.9 G/DL (ref 3.5–4.6)
ALP SERPL-CCNC: 75 U/L (ref 35–104)
ALT SERPL-CCNC: 26 U/L (ref 0–41)
ANION GAP SERPL CALCULATED.3IONS-SCNC: 7 MEQ/L (ref 9–15)
AST SERPL-CCNC: 21 U/L (ref 0–40)
BASOPHILS # BLD: 0.1 K/UL (ref 0–0.2)
BASOPHILS NFR BLD: 1 %
BILIRUB SERPL-MCNC: 0.4 MG/DL (ref 0.2–0.7)
BUN SERPL-MCNC: 22 MG/DL (ref 8–23)
CALCIUM SERPL-MCNC: 8.9 MG/DL (ref 8.5–9.9)
CHLORIDE SERPL-SCNC: 104 MEQ/L (ref 95–107)
CO2 SERPL-SCNC: 26 MEQ/L (ref 20–31)
CREAT SERPL-MCNC: 1.17 MG/DL (ref 0.7–1.2)
EOSINOPHIL # BLD: 0.3 K/UL (ref 0–0.7)
EOSINOPHIL NFR BLD: 4.8 %
ERYTHROCYTE [DISTWIDTH] IN BLOOD BY AUTOMATED COUNT: 13.2 % (ref 11.5–14.5)
GLOBULIN SER CALC-MCNC: 2.5 G/DL (ref 2.3–3.5)
GLUCOSE SERPL-MCNC: 126 MG/DL (ref 70–99)
HCT VFR BLD AUTO: 38.4 % (ref 42–52)
HGB BLD-MCNC: 12.7 G/DL (ref 14–18)
LYMPHOCYTES # BLD: 1.1 K/UL (ref 1–4.8)
LYMPHOCYTES NFR BLD: 20 %
MCH RBC QN AUTO: 32.2 PG (ref 27–31.3)
MCHC RBC AUTO-ENTMCNC: 33.1 % (ref 33–37)
MCV RBC AUTO: 97.2 FL (ref 79–92.2)
MONOCYTES # BLD: 0.4 K/UL (ref 0.2–0.8)
MONOCYTES NFR BLD: 7.4 %
NEUTROPHILS # BLD: 3.5 K/UL (ref 1.4–6.5)
NEUTS SEG NFR BLD: 66.4 %
PLATELET # BLD AUTO: 169 K/UL (ref 130–400)
POTASSIUM SERPL-SCNC: 5.2 MEQ/L (ref 3.4–4.9)
PROT SERPL-MCNC: 6.4 G/DL (ref 6.3–8)
PSA SERPL-MCNC: 4.21 NG/ML (ref 0–4)
RBC # BLD AUTO: 3.95 M/UL (ref 4.7–6.1)
SODIUM SERPL-SCNC: 137 MEQ/L (ref 135–144)
WBC # BLD AUTO: 5.3 K/UL (ref 4.8–10.8)

## 2024-07-17 PROCEDURE — 84153 ASSAY OF PSA TOTAL: CPT

## 2024-07-17 PROCEDURE — 83036 HEMOGLOBIN GLYCOSYLATED A1C: CPT

## 2024-07-17 PROCEDURE — 80053 COMPREHEN METABOLIC PANEL: CPT

## 2024-07-17 PROCEDURE — 36415 COLL VENOUS BLD VENIPUNCTURE: CPT

## 2024-07-17 PROCEDURE — 85025 COMPLETE CBC W/AUTO DIFF WBC: CPT

## 2024-07-18 LAB
ESTIMATED AVERAGE GLUCOSE: 169 MG/DL
HBA1C MFR BLD: 7.5 % (ref 4–6)

## 2024-07-19 ENCOUNTER — OFFICE VISIT (OUTPATIENT)
Dept: FAMILY MEDICINE CLINIC | Age: 74
End: 2024-07-19

## 2024-07-19 VITALS
SYSTOLIC BLOOD PRESSURE: 118 MMHG | TEMPERATURE: 97.4 F | BODY MASS INDEX: 33.5 KG/M2 | HEART RATE: 65 BPM | DIASTOLIC BLOOD PRESSURE: 70 MMHG | WEIGHT: 247 LBS | OXYGEN SATURATION: 97 %

## 2024-07-19 DIAGNOSIS — R97.20 ELEVATED PSA: ICD-10-CM

## 2024-07-19 DIAGNOSIS — E78.2 MIXED HYPERLIPIDEMIA: Chronic | ICD-10-CM

## 2024-07-19 DIAGNOSIS — E10.9 TYPE 1 DIABETES MELLITUS ON INSULIN THERAPY (HCC): ICD-10-CM

## 2024-07-19 DIAGNOSIS — I10 PRIMARY HYPERTENSION: Chronic | ICD-10-CM

## 2024-07-19 DIAGNOSIS — E10.42 POLYNEUROPATHY DUE TO TYPE 1 DIABETES MELLITUS (HCC): ICD-10-CM

## 2024-07-19 DIAGNOSIS — E10.40 TYPE 1 DIABETES MELLITUS WITH DIABETIC NEUROPATHY, UNSPECIFIED (HCC): Primary | ICD-10-CM

## 2024-07-19 DIAGNOSIS — E53.8 VITAMIN B12 DEFICIENCY: ICD-10-CM

## 2024-07-19 RX ORDER — PREGABALIN 150 MG/1
300 CAPSULE ORAL 2 TIMES DAILY
Qty: 360 CAPSULE | Refills: 0 | Status: SHIPPED | OUTPATIENT
Start: 2024-07-19 | End: 2024-10-17

## 2024-07-19 RX ORDER — HYDROCHLOROTHIAZIDE 12.5 MG/1
12.5 CAPSULE, GELATIN COATED ORAL DAILY
COMMUNITY

## 2024-07-19 ASSESSMENT — ENCOUNTER SYMPTOMS
SHORTNESS OF BREATH: 0
ABDOMINAL PAIN: 0
COUGH: 0
RESPIRATORY NEGATIVE: 1
EYES NEGATIVE: 1
WHEEZING: 0
RHINORRHEA: 0
GASTROINTESTINAL NEGATIVE: 1
ALLERGIC/IMMUNOLOGIC NEGATIVE: 1
SINUS PRESSURE: 0
SORE THROAT: 0

## 2024-07-19 NOTE — PATIENT INSTRUCTIONS
Reduce Irbesartan to 150mg daily and start Hydrochlorothiazide 12.5mg    Have BMP drawn before follow up with Dr Murray Monitor your blood pressure in the mean time

## 2024-07-19 NOTE — PROGRESS NOTES
[facility] PRIMARY CARE          ASSESSMENT/PLAN     Rubin Jefferson is a 74 y.o. male who presents with:  Chief Complaint   Patient presents with    Follow-up     No complaints.            1. Type 1 diabetes mellitus with diabetic neuropathy, unspecified  2. Type 1 diabetes mellitus on insulin therapy (HCC)  Well controled A1C stable 7.5  will continue on current medications  -     Basic Metabolic Panel; Future  -     Comprehensive Metabolic Panel; Future  -     CBC with Auto Differential; Future  -     Hemoglobin A1C; Future  -     insulin NPH (NOVOLIN N) 100 UNIT/ML injection vial; 30 units before breakfast, 40 units with supper, 15 units at bed time, Disp-8 each, R-3Normal  3. Polyneuropathy due to type 1 diabetes mellitus (HCC)  Stable continue current treatment   -     pregabalin (LYRICA) 150 MG capsule; Take 2 capsules by mouth 2 times daily for 90 days. Max Daily Amount: 600 mg, Disp-360 capsule, R-0Normal  4. Mixed hyperlipidemia  -     Lipid Panel; Future  5. Vitamin B12 deficiency  -     Vitamin B12 & Folate; Future  6. Elevated PSA  -     PSA, Diagnostic; Future  7. Hypertention, Primary  Potassium elevated this is chronic elevation with stable kidney function greater than 60 GFR.  Will reduce Avapro to  150mg daily start HCTZ 12.5 recheck labs in 3 weeks pt with FU with new PCP at that time             PATIENT EDUCATION:    Reduce Irbesartan to 150mg daily and start Hydrochlorothiazide 12.5mg    Have BMP drawn before follow up with Dr Murray Monitor your blood pressure in the mean time          PATIENT REFERRED TO:    Return for PCP  Dr Murray.    DISCHARGE MEDICATIONS:  New Prescriptions    No medications on file     Cannot display discharge medications since this is not an admission.       David Olmos, APRN - CNP      SUBJECTIVE/REVIEW OF SYSTEMS     Review of Systems   Constitutional: Negative.  Negative for chills, fatigue and fever.   HENT: Negative.  Negative for congestion, ear pain,

## 2024-07-21 DIAGNOSIS — M15.9 PRIMARY OSTEOARTHRITIS INVOLVING MULTIPLE JOINTS: ICD-10-CM

## 2024-08-01 SDOH — HEALTH STABILITY: PHYSICAL HEALTH: ON AVERAGE, HOW MANY MINUTES DO YOU ENGAGE IN EXERCISE AT THIS LEVEL?: 0 MIN

## 2024-08-01 SDOH — HEALTH STABILITY: PHYSICAL HEALTH: ON AVERAGE, HOW MANY DAYS PER WEEK DO YOU ENGAGE IN MODERATE TO STRENUOUS EXERCISE (LIKE A BRISK WALK)?: 0 DAYS

## 2024-08-02 ENCOUNTER — OFFICE VISIT (OUTPATIENT)
Dept: FAMILY MEDICINE CLINIC | Age: 74
End: 2024-08-02

## 2024-08-02 VITALS
WEIGHT: 242.4 LBS | HEIGHT: 72 IN | SYSTOLIC BLOOD PRESSURE: 116 MMHG | TEMPERATURE: 97.8 F | OXYGEN SATURATION: 98 % | DIASTOLIC BLOOD PRESSURE: 64 MMHG | BODY MASS INDEX: 32.83 KG/M2 | HEART RATE: 58 BPM

## 2024-08-02 VITALS
WEIGHT: 242.4 LBS | HEART RATE: 58 BPM | OXYGEN SATURATION: 98 % | HEIGHT: 72 IN | DIASTOLIC BLOOD PRESSURE: 64 MMHG | SYSTOLIC BLOOD PRESSURE: 116 MMHG | BODY MASS INDEX: 32.83 KG/M2 | TEMPERATURE: 97.8 F

## 2024-08-02 DIAGNOSIS — Z71.89 ACP (ADVANCE CARE PLANNING): ICD-10-CM

## 2024-08-02 DIAGNOSIS — E10.40 TYPE 1 DIABETES MELLITUS WITH DIABETIC NEUROPATHY, UNSPECIFIED (HCC): ICD-10-CM

## 2024-08-02 DIAGNOSIS — E11.69 HYPERLIPIDEMIA ASSOCIATED WITH TYPE 2 DIABETES MELLITUS (HCC): ICD-10-CM

## 2024-08-02 DIAGNOSIS — E13.22: ICD-10-CM

## 2024-08-02 DIAGNOSIS — I12.9 TYPE 2 DM WITH CKD STAGE 1 AND HYPERTENSION (HCC): ICD-10-CM

## 2024-08-02 DIAGNOSIS — I12.9: ICD-10-CM

## 2024-08-02 DIAGNOSIS — N18.1: ICD-10-CM

## 2024-08-02 DIAGNOSIS — E11.22 TYPE 2 DM WITH CKD STAGE 1 AND HYPERTENSION (HCC): ICD-10-CM

## 2024-08-02 DIAGNOSIS — E10.42 POLYNEUROPATHY DUE TO TYPE 1 DIABETES MELLITUS (HCC): ICD-10-CM

## 2024-08-02 DIAGNOSIS — M15.9 PRIMARY OSTEOARTHRITIS INVOLVING MULTIPLE JOINTS: Primary | ICD-10-CM

## 2024-08-02 DIAGNOSIS — N18.1 TYPE 2 DM WITH CKD STAGE 1 AND HYPERTENSION (HCC): ICD-10-CM

## 2024-08-02 DIAGNOSIS — Z00.00 MEDICARE ANNUAL WELLNESS VISIT, SUBSEQUENT: Primary | ICD-10-CM

## 2024-08-02 DIAGNOSIS — E78.5 HYPERLIPIDEMIA ASSOCIATED WITH TYPE 2 DIABETES MELLITUS (HCC): ICD-10-CM

## 2024-08-02 DIAGNOSIS — Z91.81 AT RISK FOR FALLING: ICD-10-CM

## 2024-08-02 DIAGNOSIS — I48.0 PAROXYSMAL ATRIAL FIBRILLATION (HCC): Chronic | ICD-10-CM

## 2024-08-02 PROBLEM — B35.3 TINEA PEDIS: Chronic | Status: RESOLVED | Noted: 2023-01-24 | Resolved: 2024-08-02

## 2024-08-02 PROBLEM — E10.9 TYPE 1 DIABETES MELLITUS ON INSULIN THERAPY (HCC): Status: RESOLVED | Noted: 2020-01-30 | Resolved: 2024-08-02

## 2024-08-02 PROBLEM — E10.69: Status: RESOLVED | Noted: 2021-01-29 | Resolved: 2024-08-02

## 2024-08-02 PROBLEM — L03.115 CELLULITIS OF RIGHT LOWER EXTREMITY: Status: RESOLVED | Noted: 2021-01-22 | Resolved: 2024-08-02

## 2024-08-02 PROBLEM — B35.1: Status: RESOLVED | Noted: 2021-01-29 | Resolved: 2024-08-02

## 2024-08-02 PROBLEM — M15.0 PRIMARY OSTEOARTHRITIS INVOLVING MULTIPLE JOINTS: Status: ACTIVE | Noted: 2024-08-02

## 2024-08-02 PROBLEM — E10.29 MICROALBUMINURIA DUE TO TYPE 1 DIABETES MELLITUS (HCC): Status: RESOLVED | Noted: 2019-12-05 | Resolved: 2024-08-02

## 2024-08-02 PROBLEM — R80.9 MICROALBUMINURIA DUE TO TYPE 1 DIABETES MELLITUS (HCC): Status: RESOLVED | Noted: 2019-12-05 | Resolved: 2024-08-02

## 2024-08-02 RX ORDER — ERGOCALCIFEROL 1.25 MG/1
1 CAPSULE ORAL WEEKLY
Qty: 12 CAPSULE | Refills: 0 | Status: SHIPPED | OUTPATIENT
Start: 2024-08-02 | End: 2024-10-31

## 2024-08-02 RX ORDER — VITAMIN K2 90 MCG
1 CAPSULE ORAL DAILY
COMMUNITY
Start: 2024-08-02

## 2024-08-02 SDOH — HEALTH STABILITY: PHYSICAL HEALTH: ON AVERAGE, HOW MANY MINUTES DO YOU ENGAGE IN EXERCISE AT THIS LEVEL?: 20 MIN

## 2024-08-02 SDOH — ECONOMIC STABILITY: INCOME INSECURITY: IN THE LAST 12 MONTHS, WAS THERE A TIME WHEN YOU WERE NOT ABLE TO PAY THE MORTGAGE OR RENT ON TIME?: NO

## 2024-08-02 SDOH — SOCIAL STABILITY: SOCIAL INSECURITY: WITHIN THE LAST YEAR, HAVE YOU BEEN AFRAID OF YOUR PARTNER OR EX-PARTNER?: NO

## 2024-08-02 SDOH — HEALTH STABILITY: MENTAL HEALTH: HOW MANY STANDARD DRINKS CONTAINING ALCOHOL DO YOU HAVE ON A TYPICAL DAY?: 1 OR 2

## 2024-08-02 SDOH — SOCIAL STABILITY: SOCIAL NETWORK: ARE YOU MARRIED, WIDOWED, DIVORCED, SEPARATED, NEVER MARRIED, OR LIVING WITH A PARTNER?: MARRIED

## 2024-08-02 SDOH — SOCIAL STABILITY: SOCIAL INSECURITY
WITHIN THE LAST YEAR, HAVE TO BEEN RAPED OR FORCED TO HAVE ANY KIND OF SEXUAL ACTIVITY BY YOUR PARTNER OR EX-PARTNER?: NO

## 2024-08-02 SDOH — HEALTH STABILITY: MENTAL HEALTH
STRESS IS WHEN SOMEONE FEELS TENSE, NERVOUS, ANXIOUS, OR CAN'T SLEEP AT NIGHT BECAUSE THEIR MIND IS TROUBLED. HOW STRESSED ARE YOU?: TO SOME EXTENT

## 2024-08-02 SDOH — ECONOMIC STABILITY: INCOME INSECURITY: HOW HARD IS IT FOR YOU TO PAY FOR THE VERY BASICS LIKE FOOD, HOUSING, MEDICAL CARE, AND HEATING?: NOT VERY HARD

## 2024-08-02 SDOH — SOCIAL STABILITY: SOCIAL NETWORK: HOW OFTEN DO YOU ATTEND CHURCH OR RELIGIOUS SERVICES?: 1 TO 4 TIMES PER YEAR

## 2024-08-02 SDOH — SOCIAL STABILITY: SOCIAL NETWORK
DO YOU BELONG TO ANY CLUBS OR ORGANIZATIONS SUCH AS CHURCH GROUPS UNIONS, FRATERNAL OR ATHLETIC GROUPS, OR SCHOOL GROUPS?: NO

## 2024-08-02 SDOH — SOCIAL STABILITY: SOCIAL NETWORK: HOW OFTEN DO YOU ATTENT MEETINGS OF THE CLUB OR ORGANIZATION YOU BELONG TO?: NEVER

## 2024-08-02 SDOH — SOCIAL STABILITY: SOCIAL NETWORK: HOW OFTEN DO YOU GET TOGETHER WITH FRIENDS OR RELATIVES?: MORE THAN THREE TIMES A WEEK

## 2024-08-02 SDOH — SOCIAL STABILITY: SOCIAL INSECURITY
WITHIN THE LAST YEAR, HAVE YOU BEEN KICKED, HIT, SLAPPED, OR OTHERWISE PHYSICALLY HURT BY YOUR PARTNER OR EX-PARTNER?: NO

## 2024-08-02 SDOH — ECONOMIC STABILITY: FOOD INSECURITY: WITHIN THE PAST 12 MONTHS, THE FOOD YOU BOUGHT JUST DIDN'T LAST AND YOU DIDN'T HAVE MONEY TO GET MORE.: NEVER TRUE

## 2024-08-02 SDOH — SOCIAL STABILITY: SOCIAL INSECURITY: WITHIN THE LAST YEAR, HAVE YOU BEEN HUMILIATED OR EMOTIONALLY ABUSED IN OTHER WAYS BY YOUR PARTNER OR EX-PARTNER?: NO

## 2024-08-02 SDOH — ECONOMIC STABILITY: FOOD INSECURITY: WITHIN THE PAST 12 MONTHS, YOU WORRIED THAT YOUR FOOD WOULD RUN OUT BEFORE YOU GOT MONEY TO BUY MORE.: NEVER TRUE

## 2024-08-02 SDOH — HEALTH STABILITY: MENTAL HEALTH: HOW OFTEN DO YOU HAVE A DRINK CONTAINING ALCOHOL?: 4 OR MORE TIMES A WEEK

## 2024-08-02 SDOH — SOCIAL STABILITY: SOCIAL NETWORK
IN A TYPICAL WEEK, HOW MANY TIMES DO YOU TALK ON THE PHONE WITH FAMILY, FRIENDS, OR NEIGHBORS?: MORE THAN THREE TIMES A WEEK

## 2024-08-02 SDOH — ECONOMIC STABILITY: TRANSPORTATION INSECURITY
IN THE PAST 12 MONTHS, HAS THE LACK OF TRANSPORTATION KEPT YOU FROM MEDICAL APPOINTMENTS OR FROM GETTING MEDICATIONS?: NO

## 2024-08-02 SDOH — HEALTH STABILITY: PHYSICAL HEALTH: ON AVERAGE, HOW MANY DAYS PER WEEK DO YOU ENGAGE IN MODERATE TO STRENUOUS EXERCISE (LIKE A BRISK WALK)?: 4 DAYS

## 2024-08-02 ASSESSMENT — PATIENT HEALTH QUESTIONNAIRE - PHQ9
6. FEELING BAD ABOUT YOURSELF - OR THAT YOU ARE A FAILURE OR HAVE LET YOURSELF OR YOUR FAMILY DOWN: NOT AT ALL
SUM OF ALL RESPONSES TO PHQ QUESTIONS 1-9: 0
5. POOR APPETITE OR OVEREATING: NOT AT ALL
4. FEELING TIRED OR HAVING LITTLE ENERGY: NOT AT ALL
8. MOVING OR SPEAKING SO SLOWLY THAT OTHER PEOPLE COULD HAVE NOTICED. OR THE OPPOSITE, BEING SO FIGETY OR RESTLESS THAT YOU HAVE BEEN MOVING AROUND A LOT MORE THAN USUAL: NOT AT ALL
1. LITTLE INTEREST OR PLEASURE IN DOING THINGS: NOT AT ALL
SUM OF ALL RESPONSES TO PHQ QUESTIONS 1-9: 0
7. TROUBLE CONCENTRATING ON THINGS, SUCH AS READING THE NEWSPAPER OR WATCHING TELEVISION: NOT AT ALL
2. FEELING DOWN, DEPRESSED OR HOPELESS: NOT AT ALL
SUM OF ALL RESPONSES TO PHQ9 QUESTIONS 1 & 2: 0
SUM OF ALL RESPONSES TO PHQ QUESTIONS 1-9: 0
SUM OF ALL RESPONSES TO PHQ QUESTIONS 1-9: 0
9. THOUGHTS THAT YOU WOULD BE BETTER OFF DEAD, OR OF HURTING YOURSELF: NOT AT ALL
3. TROUBLE FALLING OR STAYING ASLEEP: NOT AT ALL
10. IF YOU CHECKED OFF ANY PROBLEMS, HOW DIFFICULT HAVE THESE PROBLEMS MADE IT FOR YOU TO DO YOUR WORK, TAKE CARE OF THINGS AT HOME, OR GET ALONG WITH OTHER PEOPLE: NOT DIFFICULT AT ALL

## 2024-08-02 ASSESSMENT — ENCOUNTER SYMPTOMS
EYE PAIN: 0
VOICE CHANGE: 0
ABDOMINAL PAIN: 0
CONSTIPATION: 0
COLOR CHANGE: 0
PHOTOPHOBIA: 0
BLOOD IN STOOL: 0
NAUSEA: 0

## 2024-08-02 ASSESSMENT — LIFESTYLE VARIABLES
HOW OFTEN DURING THE LAST YEAR HAVE YOU BEEN UNABLE TO REMEMBER WHAT HAPPENED THE NIGHT BEFORE BECAUSE YOU HAD BEEN DRINKING: NEVER
HOW OFTEN DO YOU HAVE A DRINK CONTAINING ALCOHOL: 4 OR MORE TIMES A WEEK
HOW OFTEN DURING THE LAST YEAR HAVE YOU NEEDED AN ALCOHOLIC DRINK FIRST THING IN THE MORNING TO GET YOURSELF GOING AFTER A NIGHT OF HEAVY DRINKING: NEVER
HOW MANY STANDARD DRINKS CONTAINING ALCOHOL DO YOU HAVE ON A TYPICAL DAY: 3 OR 4
HAVE YOU OR SOMEONE ELSE BEEN INJURED AS A RESULT OF YOUR DRINKING: NO
HOW OFTEN DURING THE LAST YEAR HAVE YOU FOUND THAT YOU WERE NOT ABLE TO STOP DRINKING ONCE YOU HAD STARTED: NEVER
HOW OFTEN DURING THE LAST YEAR HAVE YOU FAILED TO DO WHAT WAS NORMALLY EXPECTED FROM YOU BECAUSE OF DRINKING: NEVER
HAS A RELATIVE, FRIEND, DOCTOR, OR ANOTHER HEALTH PROFESSIONAL EXPRESSED CONCERN ABOUT YOUR DRINKING OR SUGGESTED YOU CUT DOWN: NO
HOW OFTEN DURING THE LAST YEAR HAVE YOU HAD A FEELING OF GUILT OR REMORSE AFTER DRINKING: NEVER

## 2024-08-02 ASSESSMENT — COLUMBIA-SUICIDE SEVERITY RATING SCALE - C-SSRS
1. WITHIN THE PAST MONTH, HAVE YOU WISHED YOU WERE DEAD OR WISHED YOU COULD GO TO SLEEP AND NOT WAKE UP?: NO
2. HAVE YOU ACTUALLY HAD ANY THOUGHTS OF KILLING YOURSELF?: NO
6. HAVE YOU EVER DONE ANYTHING, STARTED TO DO ANYTHING, OR PREPARED TO DO ANYTHING TO END YOUR LIFE?: NO

## 2024-08-02 NOTE — ASSESSMENT & PLAN NOTE
Blood pressure within normal limits today in the office. Continue current medication.  Advised to continue DASH diet.

## 2024-08-02 NOTE — PROGRESS NOTES
ezetimibe (ZETIA) 10 MG tablet Take 1 tablet by mouth daily Indications: takes 1-2 times per week Yes ProviderRose Mary MD   Lancets MISC Test five times daily. Yes Chuck Crockett MD   aspirin 81 MG tablet Take 1 tablet by mouth daily Yes Rose Mary Jo MD   Docusate Sodium (COLACE PO) Take 400 mg by mouth every evening  Yes Rose Mary Jo MD   metoprolol succinate (TOPROL XL) 50 MG extended release tablet Take 1 tablet by mouth nightly Yes Provider, MD Felisa Bazziam (Including outside providers/suppliers regularly involved in providing care):   Patient Care Team:  Fiona Pearce MD as PCP - General (Internal Medicine)  David Olmos APRN - CNP [facility]CP [facility]  Yessy Mena MD (Gastroenterology)  Debby Ma DO (Optometry)  Karla Asif MD as Consulting Physician (Cardiology)      Reviewed and updated this visit:  Meds  Med Hx  Surg Hx  Soc Hx  Fam Hx

## 2024-08-02 NOTE — PATIENT INSTRUCTIONS
make an appointment with your doctor to talk about when and how you'll get started with a plan.  Follow-up care is a key part of your treatment and safety. Be sure to make and go to all appointments, and call your doctor if you are having problems. It's also a good idea to know your test results and keep a list of the medicines you take.  How can you care for yourself at home?  Set realistic goals. Many people expect to lose much more weight than is likely. A weight loss of 5% to 10% of your body weight may be enough to improve your health.  Get family and friends involved to provide support. Talk to them about why you are trying to lose weight, and ask them to help. They can help by participating in exercise and having meals with you, even if they may be eating something different.  Find what works best for you. If you do not have time or do not like to cook, a program that offers meal replacement bars or shakes may be better for you. Or if you like to prepare meals, finding a plan that includes daily menus and recipes may be best.  Ask your doctor about other health professionals who can help you achieve your weight loss goals.  A dietitian can help you make healthy changes in your diet.  An exercise specialist or  can help you develop a safe and effective exercise program.  A counselor or psychiatrist can help you cope with issues such as depression, anxiety, or family problems that can make it hard to focus on weight loss.  Consider joining a support group for people who are trying to lose weight. Your doctor can suggest groups in your area.  Where can you learn more?  Go to https://www.[employer].com.net/patientEd and enter U357 to learn more about \"Starting a Weight Loss Plan: Care Instructions.\"  Current as of: September 20, 2023  Content Version: 14.1  © 3976-9686 [employer].   Care instructions adapted under license by [employer]. If you have questions about a medical condition

## 2024-08-02 NOTE — ASSESSMENT & PLAN NOTE
Read the labels of the foods you buy from the market to find out how much fat is present. Under 5% of total fat on a label means it is \"low fat\". Over 20% of total fat on a label means it is \"high fat\".  Of total calories that you eat you should only be obtaining 10% or less of these calories from saturated fats.    Eat high fiber foods including green leafy vegetables, fruits, legum's and  whole grain breads. .This type of fiber helps lower cholesterol in the body. Choose oatmeal, fruits (like apples), beans, and nuts to get the most soluble fiber.    Stay away from butter, stick margarine, shortening, lard, palm and coconut oil.  Choose plant-based spreads instead.    Do not eat high-fat processed foods like hot dogs, gimenez, sausage, ham and other luncheon meats high in fat, and some frozen foods.  Routinely choose fish, chicken, turkey, and lean meats instead.    As a rule of thumb stay away from deep fried foods.

## 2024-08-13 ENCOUNTER — APPOINTMENT (OUTPATIENT)
Dept: CARDIOLOGY | Facility: CLINIC | Age: 74
End: 2024-08-13
Payer: MEDICARE

## 2024-08-13 ENCOUNTER — HOSPITAL ENCOUNTER (OUTPATIENT)
Dept: CARDIOLOGY | Facility: HOSPITAL | Age: 74
Discharge: HOME | End: 2024-08-13
Payer: MEDICARE

## 2024-08-13 VITALS
BODY MASS INDEX: 34.44 KG/M2 | DIASTOLIC BLOOD PRESSURE: 64 MMHG | SYSTOLIC BLOOD PRESSURE: 106 MMHG | HEIGHT: 71 IN | WEIGHT: 246 LBS | HEART RATE: 53 BPM

## 2024-08-13 DIAGNOSIS — I10 PRIMARY HYPERTENSION: ICD-10-CM

## 2024-08-13 DIAGNOSIS — Z95.818 STATUS POST PLACEMENT OF IMPLANTABLE LOOP RECORDER: ICD-10-CM

## 2024-08-13 DIAGNOSIS — Z95.818 STATUS POST PLACEMENT OF IMPLANTABLE LOOP RECORDER: Primary | ICD-10-CM

## 2024-08-13 PROCEDURE — 93291 INTERROG DEV EVAL SCRMS IP: CPT

## 2024-08-13 PROCEDURE — 3074F SYST BP LT 130 MM HG: CPT | Performed by: NURSE PRACTITIONER

## 2024-08-13 PROCEDURE — 1160F RVW MEDS BY RX/DR IN RCRD: CPT | Performed by: NURSE PRACTITIONER

## 2024-08-13 PROCEDURE — 4010F ACE/ARB THERAPY RXD/TAKEN: CPT | Performed by: NURSE PRACTITIONER

## 2024-08-13 PROCEDURE — 99214 OFFICE O/P EST MOD 30 MIN: CPT | Performed by: NURSE PRACTITIONER

## 2024-08-13 PROCEDURE — 3078F DIAST BP <80 MM HG: CPT | Performed by: NURSE PRACTITIONER

## 2024-08-13 PROCEDURE — 1159F MED LIST DOCD IN RCRD: CPT | Performed by: NURSE PRACTITIONER

## 2024-08-13 PROCEDURE — 93000 ELECTROCARDIOGRAM COMPLETE: CPT | Mod: DISTINCT PROCEDURAL SERVICE | Performed by: INTERNAL MEDICINE

## 2024-08-13 PROCEDURE — 93291 INTERROG DEV EVAL SCRMS IP: CPT | Performed by: INTERNAL MEDICINE

## 2024-08-13 PROCEDURE — 1036F TOBACCO NON-USER: CPT | Performed by: NURSE PRACTITIONER

## 2024-08-13 RX ORDER — IRBESARTAN 150 MG/1
150 TABLET ORAL DAILY
Qty: 90 TABLET | Refills: 3 | Status: SHIPPED | OUTPATIENT
Start: 2024-08-13 | End: 2025-08-13

## 2024-08-13 RX ORDER — HYDROCHLOROTHIAZIDE 12.5 MG/1
1 CAPSULE ORAL DAILY
COMMUNITY

## 2024-08-13 NOTE — PROGRESS NOTES
"CARDIOLOGY OFFICE VISIT      CHIEF COMPLAINT  Chief Complaint   Patient presents with    Follow-up     Pt is here today following up after 6 months with device   Chief complaint: \"My irbesartan was cut in half due to high potassium level and I was placed on hydrochlorothiazide.\"    HISTORY OF PRESENT ILLNESS  HPI  History: The patient is a 74-year-old  male who is followed for paroxysmal atrial fibrillation controlled on beta-blockade, magnesium oxide and is presently not anticoagulated due to low arrhythmic burden.  He has a history of hypertension, dyslipidemia, diabetes mellitus, and most recently hyperkalemia secondary to the use of ARB.  The patient reports his irbesartan was reduced from 300 mg to 150 mg daily.  He is scheduled for repeat blood work next week to reevaluate renal function and electrolytes.  He denies chest pain, palpitations, dizziness, lightheadedness, shortness of breath, or abdominal distention.  He has chronic left lower extremity edema due to a remote injury.  He states the swelling improved slightly with elevating the extremity.  Past Medical History  History reviewed. No pertinent past medical history.    Social History  Social History     Tobacco Use    Smoking status: Never    Smokeless tobacco: Never   Substance Use Topics    Alcohol use: Yes     Comment: occasional    Drug use: Never       Family History   No family history on file.     Allergies:  Allergies   Allergen Reactions    Pollen Extracts Unknown    Shellfish Derived Unknown    Vascepa [Icosapent Ethyl] Unknown    Iodine Rash        Outpatient Medications:  Current Outpatient Medications   Medication Instructions    aspirin 81 mg EC tablet Take 1 tablet daily    diclofenac (VOLTAREN) 50 mg, oral, 3 times daily, Do not crush, chew, or split.    docusate sodium (Colace) 100 mg capsule Take 4 capsules daily    DULoxetine (Cymbalta) 60 mg DR capsule Take 1 capsule daily. Do not crush or chew.    ezetimibe (ZETIA) 10 mg, " oral, Once Weekly, Take 1 tablet daily    hydroCHLOROthiazide (Microzide) 12.5 mg capsule 1 capsule, oral, Daily    insulin lispro (HumaLOG U-100 Insulin) 100 unit/mL injection Use as direct on sliding scale    insulin regular (NovoLIN R Regular U100 Insulin) 100 unit/mL injection Inject as directed    irbesartan (AVAPRO) 300 mg, oral, Daily    magnesium oxide (Mag-Ox) 400 mg tablet Take 1 tablet twice daily    metoprolol succinate XL (TOPROL-XL) 50 mg, oral, Daily    polyethylene glycol (Glycolax, Miralax) 17 gram packet Use as needed as directed    pregabalin (Lyrica) 150 mg capsule Take 1 capsule 3 times daily    rosuvastatin (Crestor) 20 mg tablet Take 1 tablet at bedtime          REVIEW OF SYSTEMS  Review of Systems   All other systems reviewed and are negative.        VITALS  Vitals:    08/13/24 0917   BP: 106/64   Pulse: 53       PHYSICAL EXAM  Vitals and nursing note reviewed.   Constitutional:       Appearance: Normal appearance.   HENT:      Head: Normocephalic.   Neck:      Vascular: No JVD. Carotid upstrokes II/IV.  Cardiovascular:      Rate and Rhythm: Normal rate and regular rhythm.      Pulses: Normal pulses.      Heart sounds: Normal S1 S2, no S3 S4.  No murmurs or rubs.     1+ left ankle edema, no edema on the right.  Pulmonary:      Effort: Pulmonary effort is normal. Respirations regular and nonlabored.     Breath sounds: Clear to auscultation posterior laterally.  Abdominal:      General: Bowel sounds are normal.      Palpations: Abdomen is soft.   Musculoskeletal:         General: Normal range of motion.      Cervical back: Normal range of motion.   Skin:     General: Skin is warm and dry.  Left parasternal loop recorder pocket is well-healed without redness swelling or drainage.  Neurological:      General: No focal deficit present.      Mental Status: Alert and oriented to person, place, and time.      Motor: Motor function is intact.   Psychiatric:         Attention and Perception: Attention  and perception normal.         Mood and Affect: Mood and affect normal.         Speech: Speech normal.         Behavior: Behavior normal. Behavior is cooperative.         Thought Content: Thought content normal.         Cognition and Memory: Cognition and memory normal.     Labs and testing: Twelve-lead EKG reveals sinus bradycardia at  53 bpm.  QRS durations 138 ms,  ms, QTc 410 ms.  Right bundle branch block and left anterior fascicular block is noted.  Loop recorder interrogation dated August 13, 2024 reveals sinus bradycardia with heart rates of 39 to 43 bpm between the morning hours at 3:43 AM and 5:47 AM.  Longest duration is 15 seconds.  No significant episodes of bradycardia, heart block, or pauses noted during the daytime hours.  Device interrogations dated July 12, 2024, May 31, 2024 and April 26, 2024 revealed no significant abnormalities.  Labs dated July 17, 2024 revealed a potassium of 5.2, blood sugar 126, BUN 22, creatinine 1.17, GFR 64.8.      ASSESSMENT AND PLAN       Clinical impressions:  1. Paroxysmal atrial fibrillation controlled on beta-blockade and presently not anticoagulated due to low arrhythmic burden with device interrogation today revealing a burden of 0%.  2. Spinal stenosis with history of falls.  3. Hypertension, controlled with a blood pressure today 106/64.  Repeat blood pressures were 96/60 on the right and 100/60 on the left.  4. Dyslipidemia on statin.  5. Diabetes mellitus type 1.  6. Hyperdynamic ejection fraction at 75% per 2D echocardiogram dated February 20, 2023.  7. Loop recorder implant ([employer] reveal Linq) on March 16, 2021 for evaluation of arrhythmic burden.  8. Class I obesity with a BMI of 34.31.  9. Pulmonary hypertension with right ventricular systolic pressure 47 mmHg per 2D echocardiogram dated February 20, 2023.     Recommendations:  1.  Continue current medications as prescribed.  2.  Obtain loop recorder checks as scheduled.  The patient will  undergo an in clinic check in 6 months prior to the office visit with Dr. Barillas.  3.  Follow-up in office with Dr. Barillas in 6 months or sooner if needed.  4.  Follow-up in office with Dr. Elias on February 12, 2025 at 8:30 AM as scheduled or sooner if needed.    Evaluation and note by Magnolia Gomez, CNP  **Please excuse any errors in grammar or translation related to this dictation.  Voice recognition software was utilized to prepare this document.**

## 2024-08-30 ENCOUNTER — HOSPITAL ENCOUNTER (OUTPATIENT)
Dept: CARDIOLOGY | Facility: HOSPITAL | Age: 74
Discharge: HOME | End: 2024-08-30
Payer: MEDICARE

## 2024-08-30 DIAGNOSIS — I48.92 ATRIAL FIBRILLATION AND FLUTTER (MULTI): ICD-10-CM

## 2024-08-30 DIAGNOSIS — I48.91 ATRIAL FIBRILLATION AND FLUTTER (MULTI): ICD-10-CM

## 2024-08-30 DIAGNOSIS — Z95.818 STATUS POST PLACEMENT OF IMPLANTABLE LOOP RECORDER: ICD-10-CM

## 2024-09-13 ENCOUNTER — HOSPITAL ENCOUNTER (OUTPATIENT)
Dept: CARDIOLOGY | Facility: HOSPITAL | Age: 74
Discharge: HOME | End: 2024-09-13
Payer: MEDICARE

## 2024-09-13 DIAGNOSIS — I48.91 ATRIAL FIBRILLATION AND FLUTTER (MULTI): ICD-10-CM

## 2024-09-13 DIAGNOSIS — I48.92 ATRIAL FIBRILLATION AND FLUTTER (MULTI): ICD-10-CM

## 2024-09-13 DIAGNOSIS — Z95.818 STATUS POST PLACEMENT OF IMPLANTABLE LOOP RECORDER: ICD-10-CM

## 2024-09-20 ENCOUNTER — HOSPITAL ENCOUNTER (OUTPATIENT)
Dept: CARDIOLOGY | Facility: HOSPITAL | Age: 74
Discharge: HOME | End: 2024-09-20
Payer: MEDICARE

## 2024-09-20 DIAGNOSIS — I48.92 ATRIAL FIBRILLATION AND FLUTTER: ICD-10-CM

## 2024-09-20 DIAGNOSIS — I48.91 ATRIAL FIBRILLATION AND FLUTTER: ICD-10-CM

## 2024-09-20 DIAGNOSIS — Z95.818 STATUS POST PLACEMENT OF IMPLANTABLE LOOP RECORDER: ICD-10-CM

## 2024-10-25 ENCOUNTER — HOSPITAL ENCOUNTER (OUTPATIENT)
Dept: LAB | Age: 74
Discharge: HOME | End: 2024-10-25
Payer: MEDICARE

## 2024-10-25 DIAGNOSIS — E78.2 MIXED HYPERLIPIDEMIA: Chronic | ICD-10-CM

## 2024-10-25 DIAGNOSIS — E10.40 TYPE 1 DIABETES MELLITUS WITH DIABETIC NEUROPATHY, UNSPECIFIED (HCC): ICD-10-CM

## 2024-10-25 DIAGNOSIS — E53.8 VITAMIN B12 DEFICIENCY: ICD-10-CM

## 2024-10-25 DIAGNOSIS — R97.20 ELEVATED PSA: ICD-10-CM

## 2024-10-25 LAB
ALBUMIN SERPL-MCNC: 4.1 G/DL (ref 3.5–4.6)
ALP SERPL-CCNC: 74 U/L (ref 35–104)
ALT SERPL-CCNC: 49 U/L (ref 0–41)
ANION GAP SERPL CALCULATED.3IONS-SCNC: 8 MEQ/L (ref 9–15)
AST SERPL-CCNC: 36 U/L (ref 0–40)
BASOPHILS # BLD: 0.1 K/UL (ref 0–0.2)
BASOPHILS NFR BLD: 1.7 %
BILIRUB SERPL-MCNC: 0.3 MG/DL (ref 0.2–0.7)
BUN SERPL-MCNC: 23 MG/DL (ref 8–23)
CALCIUM SERPL-MCNC: 9.1 MG/DL (ref 8.5–9.9)
CHLORIDE SERPL-SCNC: 101 MEQ/L (ref 95–107)
CHOLEST SERPL-MCNC: 121 MG/DL (ref 0–199)
CO2 SERPL-SCNC: 28 MEQ/L (ref 20–31)
CREAT SERPL-MCNC: 1.14 MG/DL (ref 0.7–1.2)
EOSINOPHIL # BLD: 0.2 K/UL (ref 0–0.7)
EOSINOPHIL NFR BLD: 5.5 %
ERYTHROCYTE [DISTWIDTH] IN BLOOD BY AUTOMATED COUNT: 12.9 % (ref 11.5–14.5)
ESTIMATED AVERAGE GLUCOSE: 157 MG/DL
GLOBULIN SER CALC-MCNC: 2.5 G/DL (ref 2.3–3.5)
GLUCOSE SERPL-MCNC: 114 MG/DL (ref 70–99)
HBA1C MFR BLD: 7.1 % (ref 4–6)
HCT VFR BLD AUTO: 39.3 % (ref 42–52)
HDLC SERPL-MCNC: 41 MG/DL (ref 40–59)
HGB BLD-MCNC: 13.3 G/DL (ref 14–18)
LDLC SERPL CALC-MCNC: 49 MG/DL (ref 0–129)
LYMPHOCYTES # BLD: 1.2 K/UL (ref 1–4.8)
LYMPHOCYTES NFR BLD: 28.7 %
MCH RBC QN AUTO: 33 PG (ref 27–31.3)
MCHC RBC AUTO-ENTMCNC: 33.8 % (ref 33–37)
MCV RBC AUTO: 97.5 FL (ref 79–92.2)
MONOCYTES # BLD: 0.4 K/UL (ref 0.2–0.8)
MONOCYTES NFR BLD: 9.5 %
NEUTROPHILS # BLD: 2.3 K/UL (ref 1.4–6.5)
NEUTS SEG NFR BLD: 54.1 %
PLATELET # BLD AUTO: 154 K/UL (ref 130–400)
POTASSIUM SERPL-SCNC: 4.7 MEQ/L (ref 3.4–4.9)
PROT SERPL-MCNC: 6.6 G/DL (ref 6.3–8)
PSA SERPL-MCNC: 4.23 NG/ML (ref 0–4)
RBC # BLD AUTO: 4.03 M/UL (ref 4.7–6.1)
SODIUM SERPL-SCNC: 137 MEQ/L (ref 135–144)
TRIGL SERPL-MCNC: 157 MG/DL (ref 0–150)
WBC # BLD AUTO: 4.2 K/UL (ref 4.8–10.8)

## 2024-10-25 PROCEDURE — 80053 COMPREHEN METABOLIC PANEL: CPT

## 2024-10-25 PROCEDURE — 83036 HEMOGLOBIN GLYCOSYLATED A1C: CPT

## 2024-10-25 PROCEDURE — 84153 ASSAY OF PSA TOTAL: CPT

## 2024-10-25 PROCEDURE — 36415 COLL VENOUS BLD VENIPUNCTURE: CPT

## 2024-10-25 PROCEDURE — 80061 LIPID PANEL: CPT

## 2024-10-25 PROCEDURE — 82607 VITAMIN B-12: CPT

## 2024-10-25 PROCEDURE — 85025 COMPLETE CBC W/AUTO DIFF WBC: CPT

## 2024-10-25 PROCEDURE — 82746 ASSAY OF FOLIC ACID SERUM: CPT

## 2024-10-26 DIAGNOSIS — E10.42 POLYNEUROPATHY DUE TO TYPE 1 DIABETES MELLITUS (HCC): ICD-10-CM

## 2024-10-26 LAB
FOLATE: 8.5 NG/ML (ref 4.8–24.2)
VITAMIN B-12: 946 PG/ML (ref 232–1245)

## 2024-10-28 RX ORDER — PREGABALIN 150 MG/1
300 CAPSULE ORAL 2 TIMES DAILY
Qty: 360 CAPSULE | Refills: 0 | Status: SHIPPED | OUTPATIENT
Start: 2024-10-28 | End: 2025-01-26

## 2024-10-28 RX ORDER — DULOXETIN HYDROCHLORIDE 60 MG/1
60 CAPSULE, DELAYED RELEASE ORAL DAILY
Qty: 90 CAPSULE | Refills: 1 | Status: SHIPPED | OUTPATIENT
Start: 2024-10-28

## 2024-10-28 NOTE — TELEPHONE ENCOUNTER
Comments:     Last Office Visit (last PCP visit):   Visit date not found    Next Visit Date:  No future appointments.    **If hasn't been seen in over a year OR hasn't followed up according to last diabetes/ADHD visit, make appointment for patient before sending refill to provider.    Rx requested:  Requested Prescriptions     Pending Prescriptions Disp Refills    DULoxetine (CYMBALTA) 60 MG extended release capsule 90 capsule 1     Sig: Take 1 capsule by mouth daily    pregabalin (LYRICA) 150 MG capsule 360 capsule 0     Sig: Take 2 capsules by mouth 2 times daily for 90 days. Max Daily Amount: 600 mg

## 2024-11-08 ENCOUNTER — HOSPITAL ENCOUNTER (OUTPATIENT)
Dept: CARDIOLOGY | Facility: HOSPITAL | Age: 74
Discharge: HOME | End: 2024-11-08
Payer: MEDICARE

## 2024-11-08 DIAGNOSIS — Z95.818 PRESENCE OF OTHER CARDIAC IMPLANTS AND GRAFTS: ICD-10-CM

## 2024-11-08 DIAGNOSIS — I48.0 PAROXYSMAL ATRIAL FIBRILLATION (MULTI): ICD-10-CM

## 2024-11-08 PROCEDURE — 93298 REM INTERROG DEV EVAL SCRMS: CPT

## 2024-11-08 PROCEDURE — 93298 REM INTERROG DEV EVAL SCRMS: CPT | Performed by: INTERNAL MEDICINE

## 2024-11-17 DIAGNOSIS — E10.9 TYPE 1 DIABETES MELLITUS ON INSULIN THERAPY (HCC): ICD-10-CM

## 2024-11-18 RX ORDER — BLOOD SUGAR DIAGNOSTIC
STRIP MISCELLANEOUS
Qty: 200 EACH | Refills: 11 | Status: SHIPPED | OUTPATIENT
Start: 2024-11-18

## 2024-11-18 NOTE — TELEPHONE ENCOUNTER
Patient is requesting medication refill. Please approve or deny this request.    Rx requested:  Requested Prescriptions     Pending Prescriptions Disp Refills    blood glucose test strips (ONETOUCH VERIO) strip 200 each 11     Sig: USE 1 STRIP TO CHECK GLUCOSE FIVE TIMES DAILY AS INSTRUCTED         Last Office Visit:   Visit date not found      Next Visit Date:  No future appointments.   normal...

## 2024-12-05 ENCOUNTER — TELEPHONE (OUTPATIENT)
Dept: CARDIOLOGY | Facility: CLINIC | Age: 74
End: 2024-12-05
Payer: MEDICARE

## 2024-12-05 NOTE — TELEPHONE ENCOUNTER
Called patient left voicemail informing them his device replaced. I did tell him he had an appointment scheduled for 02/26 with a device check but if he wanted sooner to call back.

## 2024-12-06 ENCOUNTER — HOSPITAL ENCOUNTER (OUTPATIENT)
Dept: CARDIOLOGY | Facility: HOSPITAL | Age: 74
Discharge: HOME | End: 2024-12-06
Payer: MEDICARE

## 2024-12-06 DIAGNOSIS — I48.0 PAROXYSMAL ATRIAL FIBRILLATION (MULTI): ICD-10-CM

## 2024-12-06 DIAGNOSIS — Z95.818 PRESENCE OF OTHER CARDIAC IMPLANTS AND GRAFTS: ICD-10-CM

## 2024-12-13 ENCOUNTER — HOSPITAL ENCOUNTER (OUTPATIENT)
Dept: CARDIOLOGY | Facility: HOSPITAL | Age: 74
Discharge: HOME | End: 2024-12-13
Payer: MEDICARE

## 2024-12-13 DIAGNOSIS — N52.8 OTHER MALE ERECTILE DYSFUNCTION: ICD-10-CM

## 2024-12-13 DIAGNOSIS — I48.0 PAROXYSMAL ATRIAL FIBRILLATION (MULTI): ICD-10-CM

## 2024-12-13 DIAGNOSIS — E10.40 TYPE 1 DIABETES MELLITUS WITH DIABETIC NEUROPATHY, UNSPECIFIED (HCC): Primary | ICD-10-CM

## 2024-12-13 DIAGNOSIS — Z95.818 PRESENCE OF OTHER CARDIAC IMPLANTS AND GRAFTS: ICD-10-CM

## 2024-12-13 DIAGNOSIS — E10.9 TYPE 1 DIABETES MELLITUS ON INSULIN THERAPY (HCC): ICD-10-CM

## 2024-12-13 DIAGNOSIS — N40.1 BPH ASSOCIATED WITH NOCTURIA: ICD-10-CM

## 2024-12-13 DIAGNOSIS — R35.1 BPH ASSOCIATED WITH NOCTURIA: ICD-10-CM

## 2024-12-13 PROCEDURE — 93298 REM INTERROG DEV EVAL SCRMS: CPT

## 2024-12-13 RX ORDER — BLOOD SUGAR DIAGNOSTIC
STRIP MISCELLANEOUS
Qty: 200 EACH | Refills: 11 | Status: SHIPPED | OUTPATIENT
Start: 2024-12-13

## 2024-12-13 RX ORDER — TADALAFIL 5 MG/1
5 TABLET ORAL DAILY
Qty: 90 TABLET | Refills: 3 | Status: SHIPPED | OUTPATIENT
Start: 2024-12-13

## 2024-12-13 NOTE — TELEPHONE ENCOUNTER
"    History: Nedra \"Yani\" is here for her left shoulder.  She is almost 4 years out from a left reverse shoulder arthroplasty.  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  She was seen in the injury clinic and had a closed reduction.  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  She is not having any pain today and feels like she is back to doing her normal activities.    Past medical history: Multiple  Medications: Multiple  Allergies: No known drug allergies    Please refer to the intake H&P regarding the patient's review of systems, family history and social history as was done today    HEENT: Normal  Lungs: Clear to auscultation  Heart: RRR  Abdomen: Soft, nontender  Skin: clear  Extremity: This is a pleasant 66-year-old female no apparent distress.  She can forward flex to 90 degrees.  Abduction is similar.  There is slight scapular elevation.  Her elbow and hand move well.  She denies any numbness or tingling.  Contralateral exam is normal for strength, motion, stability and neurovascular assessment.    Radiographs: Previous x-rays show successful reduction of her shoulder arthroplasty with stable appearance to the components.  There is some chronic notching.    Assessment: 4 years status post reverse shoulder arthroplasty with instability    Plan: We discussed wearing a sling and trying to minimize her activity is much as possible over the next few weeks.  She does use the arm extensively given her paraplegia and she understands the likelihood of recurrent dislocation.  We certainly want to avoid surgery if at all possible.  She has a 36+5 eccentric glenosphere and a 6 poly.  Given her poor bone quality there is certainly potential for an unstable baseplate.  It is unclear if she could have revision arthroplasty given her poor bone quality and lysis and she could require conversion to a large head hemiarthroplasty.  We are going to " Comments:     Last Office Visit (last PCP visit):   Visit date not found    Next Visit Date:  No future appointments.    **If hasn't been seen in over a year OR hasn't followed up according to last diabetes/ADHD visit, make appointment for patient before sending refill to provider.    Rx requested:  Requested Prescriptions     Pending Prescriptions Disp Refills    tadalafil (CIALIS) 5 MG tablet 90 tablet 3     Sig: Take 1 tablet by mouth daily                    see her back in 6 weeks to assess progress with 2 view left shoulder x-rays.  All questions were answered today with the patient.    This note was generated with voice recognition software and may contain grammatical errors.

## 2024-12-13 NOTE — TELEPHONE ENCOUNTER
Comments:     Last Office Visit (last PCP visit):   8/2/2024    Next Visit Date:  No future appointments.    **If hasn't been seen in over a year OR hasn't followed up according to last diabetes/ADHD visit, make appointment for patient before sending refill to provider.    Rx requested:  Requested Prescriptions     Pending Prescriptions Disp Refills    blood glucose test strips (ONETOUCH VERIO) strip 200 each 11     Sig: USE 1 STRIP TO CHECK GLUCOSE FIVE TIMES DAILY AS INSTRUCTED

## 2025-01-09 DIAGNOSIS — E10.9 TYPE 1 DIABETES MELLITUS ON INSULIN THERAPY (HCC): ICD-10-CM

## 2025-01-09 RX ORDER — BLOOD SUGAR DIAGNOSTIC
STRIP MISCELLANEOUS
Qty: 200 EACH | Refills: 11 | Status: SHIPPED | OUTPATIENT
Start: 2025-01-09

## 2025-01-16 DIAGNOSIS — E10.9 TYPE 1 DIABETES MELLITUS ON INSULIN THERAPY (HCC): ICD-10-CM

## 2025-01-17 ENCOUNTER — HOSPITAL ENCOUNTER (OUTPATIENT)
Dept: CARDIOLOGY | Facility: HOSPITAL | Age: 75
Discharge: HOME | End: 2025-01-17
Payer: MEDICARE

## 2025-01-17 DIAGNOSIS — Z95.818 PRESENCE OF OTHER CARDIAC IMPLANTS AND GRAFTS: ICD-10-CM

## 2025-01-17 DIAGNOSIS — I48.0 PAROXYSMAL ATRIAL FIBRILLATION (MULTI): ICD-10-CM

## 2025-01-17 RX ORDER — BLOOD SUGAR DIAGNOSTIC
STRIP MISCELLANEOUS
Qty: 200 EACH | Refills: 11 | OUTPATIENT
Start: 2025-01-17

## 2025-01-23 DIAGNOSIS — M15.0 PRIMARY OSTEOARTHRITIS INVOLVING MULTIPLE JOINTS: ICD-10-CM

## 2025-01-23 NOTE — TELEPHONE ENCOUNTER
Comments:     Last Office Visit (last PCP visit):   8/2/2024    Next Visit Date:  No future appointments.    **If hasn't been seen in over a year OR hasn't followed up according to last diabetes/ADHD visit, make appointment for patient before sending refill to provider.    Rx requested:  Requested Prescriptions     Pending Prescriptions Disp Refills    diclofenac (VOLTAREN) 50 MG EC tablet [Pharmacy Med Name: Diclofenac Sodium 50 MG Oral Tablet Delayed Release] 270 tablet 0     Sig: TAKE 1 TABLET BY MOUTH THREE TIMES DAILY

## 2025-01-28 ENCOUNTER — TELEPHONE (OUTPATIENT)
Dept: FAMILY MEDICINE CLINIC | Age: 75
End: 2025-01-28

## 2025-01-28 NOTE — TELEPHONE ENCOUNTER
Pharmacy calling requesting a Prior authorization per patients insurance for  blood glucose test strips (ONETOUCH VERIO) strip

## 2025-02-11 DIAGNOSIS — I10 PRIMARY HYPERTENSION: ICD-10-CM

## 2025-02-11 DIAGNOSIS — E78.2 MIXED HYPERLIPIDEMIA: ICD-10-CM

## 2025-02-12 ENCOUNTER — APPOINTMENT (OUTPATIENT)
Dept: CARDIOLOGY | Facility: CLINIC | Age: 75
End: 2025-02-12
Payer: MEDICARE

## 2025-02-12 VITALS
WEIGHT: 247.2 LBS | HEIGHT: 71 IN | BODY MASS INDEX: 34.61 KG/M2 | SYSTOLIC BLOOD PRESSURE: 130 MMHG | DIASTOLIC BLOOD PRESSURE: 70 MMHG | HEART RATE: 76 BPM

## 2025-02-12 DIAGNOSIS — Z79.4 TYPE 2 DIABETES MELLITUS WITHOUT COMPLICATION, WITH LONG-TERM CURRENT USE OF INSULIN (MULTI): ICD-10-CM

## 2025-02-12 DIAGNOSIS — E11.9 INSULIN DEPENDENT TYPE 2 DIABETES MELLITUS (MULTI): ICD-10-CM

## 2025-02-12 DIAGNOSIS — E11.9 TYPE 2 DIABETES MELLITUS WITHOUT COMPLICATION, WITH LONG-TERM CURRENT USE OF INSULIN (MULTI): ICD-10-CM

## 2025-02-12 DIAGNOSIS — I10 PRIMARY HYPERTENSION: ICD-10-CM

## 2025-02-12 DIAGNOSIS — Z79.4 INSULIN DEPENDENT TYPE 2 DIABETES MELLITUS (MULTI): ICD-10-CM

## 2025-02-12 DIAGNOSIS — G60.8 PERIPHERAL SENSORY NEUROPATHY: ICD-10-CM

## 2025-02-12 DIAGNOSIS — R29.6 FREQUENT FALLS: ICD-10-CM

## 2025-02-12 DIAGNOSIS — Z86.79 H/O ATRIAL FIBRILLATION WITHOUT CURRENT MEDICATION: ICD-10-CM

## 2025-02-12 DIAGNOSIS — Z45.09 ENCOUNTER FOR LOOP RECORDER AT END OF BATTERY LIFE: ICD-10-CM

## 2025-02-12 DIAGNOSIS — E78.2 MIXED HYPERLIPIDEMIA: ICD-10-CM

## 2025-02-12 PROCEDURE — 3078F DIAST BP <80 MM HG: CPT | Performed by: INTERNAL MEDICINE

## 2025-02-12 PROCEDURE — G2211 COMPLEX E/M VISIT ADD ON: HCPCS | Performed by: INTERNAL MEDICINE

## 2025-02-12 PROCEDURE — 3075F SYST BP GE 130 - 139MM HG: CPT | Performed by: INTERNAL MEDICINE

## 2025-02-12 PROCEDURE — 1160F RVW MEDS BY RX/DR IN RCRD: CPT | Performed by: INTERNAL MEDICINE

## 2025-02-12 PROCEDURE — 99214 OFFICE O/P EST MOD 30 MIN: CPT | Performed by: INTERNAL MEDICINE

## 2025-02-12 PROCEDURE — 1159F MED LIST DOCD IN RCRD: CPT | Performed by: INTERNAL MEDICINE

## 2025-02-12 RX ORDER — METOPROLOL SUCCINATE 50 MG/1
50 TABLET, EXTENDED RELEASE ORAL DAILY
Qty: 90 TABLET | Refills: 3 | Status: SHIPPED | OUTPATIENT
Start: 2025-02-12

## 2025-02-12 RX ORDER — ROSUVASTATIN CALCIUM 20 MG/1
TABLET, COATED ORAL
Qty: 90 TABLET | Refills: 3 | Status: SHIPPED | OUTPATIENT
Start: 2025-02-12

## 2025-02-12 RX ORDER — ROSUVASTATIN CALCIUM 20 MG/1
TABLET, COATED ORAL
Qty: 90 TABLET | Refills: 0 | Status: SHIPPED | OUTPATIENT
Start: 2025-02-12

## 2025-02-12 RX ORDER — METOPROLOL SUCCINATE 50 MG/1
50 TABLET, EXTENDED RELEASE ORAL DAILY
Qty: 90 TABLET | Refills: 0 | Status: SHIPPED | OUTPATIENT
Start: 2025-02-12

## 2025-02-12 RX ORDER — IRBESARTAN 150 MG/1
150 TABLET ORAL DAILY
Qty: 90 TABLET | Refills: 3 | Status: SHIPPED | OUTPATIENT
Start: 2025-02-12 | End: 2026-02-12

## 2025-02-12 NOTE — PATIENT INSTRUCTIONS
Continue same medications and treatments.     Please bring any lab results from other providers / physicians to your next appointment.     Please bring all medicines, vitamins, and herbal supplements with you when you come to the office.     Prescriptions will not be filled unless you are compliant with your follow up appointments or have a follow up appointment scheduled as per instruction of your physician. Refills should be requested at the time of your visit.      DID YOU KNOW:  We have a pharmacy here in the [facility].  They can fill all prescriptions, not just cardiac medications.  Prescriptions from other pharmacies can easily be transferred to the  pharmacy by the  pharmacist on site.   pharmacies offer FREE HOME DELIVERY on medications to anywhere in [place]. They can sync your medications. Typically prescriptions can be ready in 10 - 15 minutes. If pharmacy is unable to fill your  prescription or if cost is more than your paying now the Pharmacist can easily transfer back to your Pharmacy of choice. Pharmacy phone # [phone].     Scribe Attestation  By signing my name below, IHemant LPN , Joan   attest that this documentation has been prepared under the direction and in the presence of Tricia Elias MD.

## 2025-02-12 NOTE — PROGRESS NOTES
1 year follow-up visit.  Subjective :   Interval review of systems is negative for chest discomfort pressure tightness heaviness palpitations lightheadedness orthopnea paroxysmal nocturnal dyspnea dependent edema or claudication TIA or CVA type symptoms or bleeding diathesis    Very infrequent palpitations    Loop recorder at end-of-life, patient says it is quite expensive for him to get the monthly device interrogations etc., and he does not want to have another loop recorder in place.  He will discuss further with Dr. Barillas.  Continues to have multiple falls, partly due to neuropathy from back surgery, partly because his leg gives way.  At least 1 fall per week.  Has learned to fall so that he does not injure himself.    12 point ROS is negative or non contributory except as noted.   History so Far :    1. Hypertension-at target  2. Diabetes mellitus-reasonable control no symptoms of hypoglycemia, probable diabetic neuropathy  3. High risk of fall, no major injuries  4. Loop recorder in place, had some atrial fibrillation few months ago, lately no atrial fibrillation, followed closely by EP service  5. Hyperlipidemia-characterized now by hypertriglyceridemia, which is mostly related to his diabetes  6. LDL profile is at target  7. Asymmetric right lower extremity edema related to right ankle injury.  8. Shortness of breath , resolved   First 9. Echocardiogram 2020-LVEF 60% left atrial diameter 3.6 cm trace tricuspid regurgitation RVSP 29 mmHg, this is unchanged compared to a report from 2014  10. Cardiac catheterization 2004-20 to 30% proximal LAD disease otherwise tortuous vessels without disease, LVEF preserved LVEDP 8 to 10 mmHg no systolic gradient across the aortic valve, renal arteries widely patent   11. Chronic right bundle branch block and left anterior fascicular block-bifascicular block with normal VA interval. Get another differential diagnosis for shortness of breath is chronotropic blunting. Patient  "is on metoprolol succinate 50 mg daily.  12.  Hyperkalemia-resolved, potassium was 4.7 in October 2024.  13.  Iodine allergy  Objective   Wt Readings from Last 3 Encounters:   02/12/25 112 kg (247 lb 3.2 oz)   08/13/24 112 kg (246 lb)   02/14/24 113 kg (250 lb)            Vitals:    02/12/25 0844 02/12/25 0845   BP: 138/64 130/70   BP Location: Left arm Left arm   Patient Position: Sitting Standing   Pulse: 76    Weight: 112 kg (247 lb 3.2 oz)    Height: 1.803 m (5' 11\")                 Physical Exam:    GENERAL APPEARANCE: in no acute distress.  CHEST: Symmetric and non-tender.  INTEGUMENT: Skin warm and dry  HEENT: No gross abnormalities identified.No pallor or scleral icterus.  NECK: Supple, no JVD, no bruit.   NEURO/PSHCY: Alert and oriented x3; appropriate behavior and responses and responses  LUNGS: Clear to auscultation bilaterally; normal respiratory effort.  HEART: Rate and rhythm regular with no evident murmur; no gallop appreciated.   ABDOMEN: Soft, non tender.  MUSCULOSKELETAL: No gross deformities.  EXTREMITIES: Warm  There is asymmetric right lower extremity edema which is chronic    Meds:  Current Outpatient Medications   Medication Instructions    aspirin 81 mg EC tablet Take 1 tablet daily    diclofenac (VOLTAREN) 50 mg, 3 times daily    docusate sodium (Colace) 100 mg capsule Take 4 capsules daily    DULoxetine (Cymbalta) 60 mg DR capsule Take 1 capsule daily. Do not crush or chew.    ezetimibe (ZETIA) 10 mg, Once Weekly    hydroCHLOROthiazide (Microzide) 12.5 mg capsule 1 capsule, Daily    insulin lispro (HumaLOG U-100 Insulin) 100 unit/mL injection Use as direct on sliding scale    insulin regular (NovoLIN R Regular U100 Insulin) 100 unit/mL injection Inject as directed    irbesartan (AVAPRO) 150 mg, oral, Daily    magnesium oxide (Mag-Ox) 400 mg tablet Take 1 tablet twice daily    metoprolol succinate XL (TOPROL-XL) 50 mg, oral, Daily    polyethylene glycol (Glycolax, Miralax) 17 gram packet Use " as needed as directed    pregabalin (LYRICA) 150 mg, 4 times daily    rosuvastatin (Crestor) 20 mg tablet Take 1 tablet at bedtime          Allergies   Allergen Reactions    Pollen Extracts Unknown    Shellfish Derived Unknown    Vascepa [Icosapent Ethyl] Unknown    Iodine Rash             LABS:    Lab Results   Component Value Date    WBC 4.1 (L) 03/16/2021    HGB 14.6 03/16/2021    HCT 43.8 03/16/2021     (L) 03/16/2021     02/01/2023    K 5.1 02/01/2023     02/01/2023    CREATININE 1.15 02/01/2023    BUN 30 (H) 02/01/2023    CO2 26 02/01/2023    INR 0.9 03/16/2021    HGBA1C 7.1 (H) 10/25/2024                   Lipid profile October 2024 notable for LDL of 49, total cholesterol 121, triglycerides 157    Patient Active Problem List    Diagnosis Date Noted    Encounter for loop recorder at end of battery life 02/12/2025    H/O atrial fibrillation without current medication 02/12/2025    BMI 34.0-34.9,adult 02/14/2024    Never smoked cigarettes 02/14/2024    Chronic kidney disease, stage 3a (Multi) 02/07/2024    Abnormal EKG 11/17/2023    CKD (chronic kidney disease) 11/17/2023    Abnormal echocardiogram 11/17/2023    Edema, lower extremity 11/17/2023    Frequent falls 11/17/2023    High risk medication use 11/17/2023    Hyperkalemia 11/17/2023    Hyperlipemia 11/17/2023    Hypertension 11/17/2023    PAF (paroxysmal atrial fibrillation) (Multi) 11/17/2023    Peripheral sensory neuropathy 11/17/2023    Paroxysmal supraventricular tachycardia (CMS-HCC) 11/17/2023    Right bundle branch block with left anterior fascicular block 11/17/2023    Shortness of breath 11/17/2023    Insulin dependent type 2 diabetes mellitus (Multi) 11/17/2023                 Assessment:    1. Primary hypertension  Follow Up In Cardiology    metoprolol succinate XL (Toprol-XL) 50 mg 24 hr tablet    Follow Up In Cardiology    irbesartan (Avapro) 150 mg tablet      2. Type 2 diabetes mellitus without complication, with  long-term current use of insulin (Multi)  Follow Up In Cardiology    Follow Up In Cardiology      3. Mixed hyperlipidemia  Follow Up In Cardiology    rosuvastatin (Crestor) 20 mg tablet    Follow Up In Cardiology      4. H/O atrial fibrillation without current medication        5. Frequent falls  Follow Up In Cardiology    Follow Up In Cardiology      6. Encounter for loop recorder at end of battery life        7. Insulin dependent type 2 diabetes mellitus (Multi)  Follow Up In Cardiology      8. BMI 34.0-34.9,adult  Follow Up In Cardiology      9. Peripheral sensory neuropathy  Follow Up In Cardiology         Clinical decision making:  From cardiology perspective, patient's blood pressure is at target, he has mild elevation of triglycerides but LDL is excellent, he is not having any angina pectoris, and his falls are not related to orthostatic hypotension.  Orthostatics were checked today and he is not orthostatic.  Falls are mechanical and related to neuropathy from back surgery.  Also has some sensory neuropathy from diabetes.  Kidney disease stage IIIa has now resolved.  Hyperkalemia has resolved  Loop recorder is nearing end-of-life for is at end-of-life.  Has upcoming appointment with EP.  Patient is finding it expensive with monthly loop recorder interrogations.  The most recent loop recorder interrogation from 12/14/2024 to 1/17/2025 reported no tachycardia or bradycardia, no atrial fibrillation.   Patient is aware of Watchman device in the event of recurrence of atrial fibrillation   I have asked him to discuss further with Dr. Barillas at upcoming appointment.      Follow up : 1 year        Provider Attestation - Scribe documentation    All medical record entries made by the Scribe were at my direction and personally dictated by me. I have reviewed the chart and agree that the record accurately reflects my personal performance of the history, physical exam, discussion and plan.

## 2025-02-26 ENCOUNTER — APPOINTMENT (OUTPATIENT)
Dept: CARDIOLOGY | Facility: CLINIC | Age: 75
End: 2025-02-26
Payer: MEDICARE

## 2025-02-26 ENCOUNTER — HOSPITAL ENCOUNTER (OUTPATIENT)
Dept: CARDIOLOGY | Facility: HOSPITAL | Age: 75
Discharge: HOME | End: 2025-02-26
Payer: MEDICARE

## 2025-02-26 VITALS
HEART RATE: 60 BPM | HEIGHT: 70 IN | SYSTOLIC BLOOD PRESSURE: 132 MMHG | BODY MASS INDEX: 34.65 KG/M2 | WEIGHT: 242 LBS | DIASTOLIC BLOOD PRESSURE: 64 MMHG

## 2025-02-26 DIAGNOSIS — Z79.899 HIGH RISK MEDICATION USE: ICD-10-CM

## 2025-02-26 DIAGNOSIS — Z79.01 ANTICOAGULATION MANAGEMENT ENCOUNTER: ICD-10-CM

## 2025-02-26 DIAGNOSIS — Z95.818 STATUS POST PLACEMENT OF IMPLANTABLE LOOP RECORDER: ICD-10-CM

## 2025-02-26 DIAGNOSIS — Z78.9 NEVER SMOKED CIGARETTES: ICD-10-CM

## 2025-02-26 DIAGNOSIS — R94.31 ABNORMAL EKG: ICD-10-CM

## 2025-02-26 DIAGNOSIS — Z51.81 ANTICOAGULATION MANAGEMENT ENCOUNTER: ICD-10-CM

## 2025-02-26 DIAGNOSIS — I48.0 PAF (PAROXYSMAL ATRIAL FIBRILLATION) (MULTI): ICD-10-CM

## 2025-02-26 DIAGNOSIS — Z95.818 STATUS POST PLACEMENT OF IMPLANTABLE LOOP RECORDER: Primary | ICD-10-CM

## 2025-02-26 PROCEDURE — 93291 INTERROG DEV EVAL SCRMS IP: CPT

## 2025-02-26 PROCEDURE — 3075F SYST BP GE 130 - 139MM HG: CPT | Performed by: INTERNAL MEDICINE

## 2025-02-26 PROCEDURE — 99214 OFFICE O/P EST MOD 30 MIN: CPT | Performed by: INTERNAL MEDICINE

## 2025-02-26 PROCEDURE — 1036F TOBACCO NON-USER: CPT | Performed by: INTERNAL MEDICINE

## 2025-02-26 PROCEDURE — 3078F DIAST BP <80 MM HG: CPT | Performed by: INTERNAL MEDICINE

## 2025-02-26 PROCEDURE — 93291 INTERROG DEV EVAL SCRMS IP: CPT | Performed by: INTERNAL MEDICINE

## 2025-02-26 PROCEDURE — 93000 ELECTROCARDIOGRAM COMPLETE: CPT | Mod: DISTINCT PROCEDURAL SERVICE | Performed by: INTERNAL MEDICINE

## 2025-02-26 RX ORDER — BLOOD-GLUCOSE SENSOR
EACH MISCELLANEOUS
COMMUNITY
Start: 2025-01-25

## 2025-02-26 RX ORDER — PEN NEEDLE, DIABETIC 29 G X1/2"
NEEDLE, DISPOSABLE MISCELLANEOUS
COMMUNITY
Start: 2024-11-20

## 2025-02-26 RX ORDER — TADALAFIL 5 MG/1
1 TABLET ORAL
COMMUNITY
Start: 2024-12-13

## 2025-02-26 RX ORDER — HUMAN INSULIN 100 [IU]/ML
INJECTION, SUSPENSION SUBCUTANEOUS
COMMUNITY
Start: 2025-01-28

## 2025-02-26 ASSESSMENT — ENCOUNTER SYMPTOMS
PALPITATIONS: 0
DYSPNEA ON EXERTION: 0

## 2025-02-26 NOTE — PROGRESS NOTES
"CARDIOLOGY OFFICE VISIT      CHIEF COMPLAINT  Chief Complaint   Patient presents with    Follow-up     6 month with device check- [employer]       HISTORY OF PRESENT ILLNESS  HPI  75-year-old  male who is followed for paroxysmal atrial fibrillation controlled on beta-blockade, magnesium oxide and is presently not anticoagulated due to low arrhythmic burden. He has a history of hypertension, dyslipidemia, diabetes mellitus, and most recently hyperkalemia secondary to the use of ARB. The patient reports his irbesartan was reduced from 300 mg to 150 mg daily.     Since the last office visit, he has been doing well.  He denies any symptoms of chest pain or shortness of breath or palpitations.    Loop recorder interrogations have not seen any episodes of atrial fibrillation or bradycardia arrhythmias.  Also device is at [employer].    EKG performed today shows sinus rhythm right bundle branch block at a rate of 60 bpm QRS duration 130 ms QT corrected 434 ms.  Rhythm strip shows the same pattern.      Past Medical History  History reviewed. No pertinent past medical history.    Social History  Social History     Tobacco Use    Smoking status: Never    Smokeless tobacco: Never   Substance Use Topics    Alcohol use: Yes     Comment: occasional    Drug use: Never       Family History   No family history on file.     Allergies:  Allergies   Allergen Reactions    Pollen Extracts Unknown    Shellfish Derived Unknown    Vascepa [Icosapent Ethyl] Unknown    Iodine Rash        Outpatient Medications:  Current Outpatient Medications   Medication Instructions    aspirin 81 mg EC tablet Take 1 tablet daily    BD Insulin Syringe Ultra-Fine 0.5 mL 31 gauge x 5/16\" syringe     diclofenac (VOLTAREN) 50 mg, 3 times daily    docusate sodium (Colace) 100 mg capsule Take 4 capsules daily    DULoxetine (Cymbalta) 60 mg DR capsule Take 1 capsule daily. Do not crush or chew.    ezetimibe (ZETIA) 10 mg, Once Weekly    FreeStyle Eyal 3 Sensor device  "    hydroCHLOROthiazide (Microzide) 12.5 mg capsule 1 capsule, Daily    insulin lispro (HumaLOG U-100 Insulin) 100 unit/mL injection Use as direct on sliding scale    insulin regular (NovoLIN R Regular U100 Insulin) 100 unit/mL injection Inject as directed    irbesartan (AVAPRO) 150 mg, oral, Daily    magnesium oxide (Mag-Ox) 400 mg tablet Take 1 tablet twice daily    metoprolol succinate XL (TOPROL-XL) 50 mg, oral, Daily    metoprolol succinate XL (TOPROL-XL) 50 mg, oral, Daily    NovoLIN N NPH U-100 Insulin 100 unit/mL injection     polyethylene glycol (Glycolax, Miralax) 17 gram packet Use as needed as directed    pregabalin (LYRICA) 150 mg, 4 times daily    rosuvastatin (Crestor) 20 mg tablet TAKE 1 TABLET BY MOUTH AT BEDTIME    rosuvastatin (Crestor) 20 mg tablet Take 1 tablet at bedtime    tadalafil (Cialis) 5 mg tablet 1 tablet, Daily (0630)          REVIEW OF SYSTEMS  Review of Systems   Cardiovascular:  Negative for chest pain, dyspnea on exertion and palpitations.   All other systems reviewed and are negative.        VITALS  Vitals:    02/26/25 0908   BP: 132/64   Pulse: 60       PHYSICAL EXAM  Constitutional:       General: Awake.      Appearance: Normal and healthy appearance. Well-developed and not in distress. Obese.   Neck:      Vascular: No JVR. JVD normal.   Pulmonary:      Effort: Pulmonary effort is normal.      Breath sounds: Normal breath sounds. No wheezing. No rhonchi. No rales.   Chest:      Chest wall: Not tender to palpatation.      Comments: Loop recorder in place   Cardiovascular:      PMI at left midclavicular line. Normal rate. Regular rhythm. Normal S1. Normal S2.       Murmurs: There is no murmur.      No gallop.  No click. No rub.   Pulses:     Intact distal pulses.   Edema:     Peripheral edema absent.   Abdominal:      Tenderness: There is no abdominal tenderness.   Musculoskeletal: Normal range of motion.         General: No tenderness. Skin:     General: Skin is warm and dry.    Neurological:      General: No focal deficit present.      Mental Status: Alert and oriented to person, place and time.           ASSESSMENT AND PLAN    Clinical impressions:  1. Paroxysmal atrial fibrillation controlled on beta-blockade and presently not anticoagulated due to low arrhythmic burden with device interrogation today revealing a burden of 0%.  2. Spinal stenosis with history of falls.  3. Hypertension, controlled  4. Dyslipidemia on statin.  5. Diabetes mellitus type 1.  6. Hyperdynamic ejection fraction at 75% per 2D echocardiogram dated February 20, 2023.  7. Loop recorder implant ([employer]tr[employer] reveal Linq) on March 16, 2021 for evaluation of arrhythmic burden.  8. Class I obesity with a BMI of 34.31.  9. Pulmonary hypertension with right ventricular systolic pressure 47 mmHg per 2D echocardiogram dated February 20, 2023.    Plan recommendation    Patient is doing well from the electrophysiology standpoint.  Will continue with beta-blocker therapy.    Loop recorder interrogations so far has not seen any episodes of atrial fibrillation.  Now loop recorder with battery at [employer].  We discussed the option of removal of loop recorder and implantation of a new 1.  So far his symptoms are almost 0.  Will continue with observation.    Follow my office every 6 months or sooner if needed.    Continue with aspirin therapy for now.    Continue beta-blocker therapy.    Risk factor modification and lifestyle modification discussed with patient. Diet , exercise and hydration discussed with patient.    I have personally review with patient during this office visit, laboratory data, echocardiogram results, stress test results, Holter-event monitor results prior and after the last electrophysiology visit. All questions has been answered.    Please excuse any errors in grammar or translation related to this dictation.  Voice recognition software was utilized to prepare this document.

## 2025-03-28 ENCOUNTER — HOSPITAL ENCOUNTER (OUTPATIENT)
Dept: CARDIOLOGY | Facility: HOSPITAL | Age: 75
Discharge: HOME | End: 2025-03-28
Payer: MEDICARE

## 2025-03-28 DIAGNOSIS — Z95.818 STATUS POST PLACEMENT OF IMPLANTABLE LOOP RECORDER: ICD-10-CM

## 2025-04-17 ENCOUNTER — RESULTS FOLLOW-UP (OUTPATIENT)
Dept: FAMILY MEDICINE CLINIC | Age: 75
End: 2025-04-17

## 2025-04-17 ENCOUNTER — HOSPITAL ENCOUNTER (OUTPATIENT)
Dept: LAB | Age: 75
Discharge: HOME | End: 2025-04-17
Payer: MEDICARE

## 2025-04-17 DIAGNOSIS — R97.20 ELEVATED PSA: ICD-10-CM

## 2025-04-17 DIAGNOSIS — E10.40 TYPE 1 DIABETES MELLITUS WITH DIABETIC NEUROPATHY, UNSPECIFIED: ICD-10-CM

## 2025-04-17 LAB
ALBUMIN SERPL-MCNC: 4 G/DL (ref 3.5–4.6)
ALP SERPL-CCNC: 84 U/L (ref 35–104)
ALT SERPL-CCNC: 47 U/L (ref 0–41)
ANION GAP SERPL CALCULATED.3IONS-SCNC: 8 MEQ/L (ref 9–15)
AST SERPL-CCNC: 48 U/L (ref 0–40)
BILIRUB SERPL-MCNC: 0.3 MG/DL (ref 0.2–0.7)
BUN SERPL-MCNC: 18 MG/DL (ref 8–23)
CALCIUM SERPL-MCNC: 9.8 MG/DL (ref 8.5–9.9)
CHLORIDE SERPL-SCNC: 103 MEQ/L (ref 95–107)
CO2 SERPL-SCNC: 28 MEQ/L (ref 20–31)
CREAT SERPL-MCNC: 1.02 MG/DL (ref 0.7–1.2)
CREAT UR-MCNC: 81.1 MG/DL
ESTIMATED AVERAGE GLUCOSE: 166 MG/DL
GLOBULIN SER CALC-MCNC: 2.6 G/DL (ref 2.3–3.5)
GLUCOSE FASTING: 167 MG/DL (ref 70–99)
HBA1C MFR BLD: 7.4 % (ref 4–6)
MICROALBUMIN UR-MCNC: 7.6 MG/DL
MICROALBUMIN/CREAT UR-RTO: 93.7 MG/G (ref 0–30)
POTASSIUM SERPL-SCNC: 4.9 MEQ/L (ref 3.4–4.9)
PROT SERPL-MCNC: 6.6 G/DL (ref 6.3–8)
PSA SERPL-MCNC: 3.63 NG/ML (ref 0–4)
SODIUM SERPL-SCNC: 139 MEQ/L (ref 135–144)

## 2025-04-17 PROCEDURE — 82570 ASSAY OF URINE CREATININE: CPT

## 2025-04-17 PROCEDURE — 82043 UR ALBUMIN QUANTITATIVE: CPT

## 2025-04-17 PROCEDURE — 83036 HEMOGLOBIN GLYCOSYLATED A1C: CPT

## 2025-04-17 PROCEDURE — 84153 ASSAY OF PSA TOTAL: CPT

## 2025-04-17 PROCEDURE — 80053 COMPREHEN METABOLIC PANEL: CPT

## 2025-04-17 PROCEDURE — 36415 COLL VENOUS BLD VENIPUNCTURE: CPT

## 2025-04-21 RX ORDER — DULOXETIN HYDROCHLORIDE 60 MG/1
60 CAPSULE, DELAYED RELEASE ORAL DAILY
Qty: 90 CAPSULE | Refills: 0 | Status: SHIPPED | OUTPATIENT
Start: 2025-04-21

## 2025-04-21 NOTE — TELEPHONE ENCOUNTER
Comments:     Last Office Visit (last PCP visit):   8/2/2024    Next Visit Date:  No future appointments.    **If hasn't been seen in over a year OR hasn't followed up according to last diabetes/ADHD visit, make appointment for patient before sending refill to provider.    Rx requested:  Requested Prescriptions     Pending Prescriptions Disp Refills    DULoxetine (CYMBALTA) 60 MG extended release capsule [Pharmacy Med Name: DULoxetine HCl 60 MG Oral Capsule Delayed Release Particles] 90 capsule 0     Sig: Take 1 capsule by mouth once daily

## 2025-04-27 DIAGNOSIS — M15.0 PRIMARY OSTEOARTHRITIS INVOLVING MULTIPLE JOINTS: ICD-10-CM

## 2025-04-29 ENCOUNTER — HOSPITAL ENCOUNTER (OUTPATIENT)
Age: 75
Discharge: HOME | End: 2025-05-01
Payer: MEDICARE

## 2025-04-29 ENCOUNTER — HOSPITAL ENCOUNTER (OUTPATIENT)
Dept: GENERAL RADIOLOGY | Age: 75
Discharge: HOME | End: 2025-05-01
Attending: INTERNAL MEDICINE
Payer: MEDICARE

## 2025-04-29 ENCOUNTER — OFFICE VISIT (OUTPATIENT)
Dept: FAMILY MEDICINE CLINIC | Age: 75
End: 2025-04-29

## 2025-04-29 VITALS
DIASTOLIC BLOOD PRESSURE: 60 MMHG | HEIGHT: 72 IN | BODY MASS INDEX: 33.08 KG/M2 | SYSTOLIC BLOOD PRESSURE: 126 MMHG | TEMPERATURE: 97.4 F | HEART RATE: 76 BPM | WEIGHT: 244.2 LBS | OXYGEN SATURATION: 96 %

## 2025-04-29 DIAGNOSIS — E78.5 HYPERLIPIDEMIA ASSOCIATED WITH TYPE 2 DIABETES MELLITUS (HCC): ICD-10-CM

## 2025-04-29 DIAGNOSIS — E11.9 INSULIN DEPENDENT TYPE 2 DIABETES MELLITUS (HCC): Primary | ICD-10-CM

## 2025-04-29 DIAGNOSIS — D51.8 OTHER VITAMIN B12 DEFICIENCY ANEMIA: ICD-10-CM

## 2025-04-29 DIAGNOSIS — M21.961 ANKLE DEFORMITY, RIGHT: ICD-10-CM

## 2025-04-29 DIAGNOSIS — B35.1 FUNGAL NAIL INFECTION: ICD-10-CM

## 2025-04-29 DIAGNOSIS — G82.20 CHRONIC PROGRESSIVE PARAPARESIS (HCC): ICD-10-CM

## 2025-04-29 DIAGNOSIS — I87.8 CHRONIC VENOUS STASIS: ICD-10-CM

## 2025-04-29 DIAGNOSIS — Z79.4 INSULIN DEPENDENT TYPE 2 DIABETES MELLITUS (HCC): Primary | ICD-10-CM

## 2025-04-29 DIAGNOSIS — I48.0 PAROXYSMAL ATRIAL FIBRILLATION (HCC): ICD-10-CM

## 2025-04-29 DIAGNOSIS — E11.69 HYPERLIPIDEMIA ASSOCIATED WITH TYPE 2 DIABETES MELLITUS (HCC): ICD-10-CM

## 2025-04-29 PROBLEM — E87.5 SERUM POTASSIUM ELEVATED: Status: RESOLVED | Noted: 2023-06-09 | Resolved: 2025-04-29

## 2025-04-29 PROBLEM — I12.9 TYPE 2 DM WITH CKD STAGE 1 AND HYPERTENSION (HCC): Status: RESOLVED | Noted: 2024-08-02 | Resolved: 2025-04-29

## 2025-04-29 PROBLEM — E10.42 POLYNEUROPATHY DUE TO TYPE 1 DIABETES MELLITUS (HCC): Status: RESOLVED | Noted: 2020-07-07 | Resolved: 2025-04-29

## 2025-04-29 PROBLEM — E10.40 TYPE 1 DIABETES MELLITUS WITH DIABETIC NEUROPATHY, UNSPECIFIED (HCC): Status: RESOLVED | Noted: 2022-07-29 | Resolved: 2025-04-29

## 2025-04-29 PROBLEM — R97.20 ELEVATED PSA: Status: RESOLVED | Noted: 2023-01-30 | Resolved: 2025-04-29

## 2025-04-29 PROBLEM — R60.0 BILATERAL LOWER EXTREMITY EDEMA: Status: RESOLVED | Noted: 2021-01-29 | Resolved: 2025-04-29

## 2025-04-29 PROBLEM — N18.1 TYPE 2 DM WITH CKD STAGE 1 AND HYPERTENSION (HCC): Status: RESOLVED | Noted: 2024-08-02 | Resolved: 2025-04-29

## 2025-04-29 PROBLEM — E11.22 TYPE 2 DM WITH CKD STAGE 1 AND HYPERTENSION (HCC): Status: RESOLVED | Noted: 2024-08-02 | Resolved: 2025-04-29

## 2025-04-29 PROCEDURE — 73610 X-RAY EXAM OF ANKLE: CPT

## 2025-04-29 RX ORDER — IRBESARTAN 150 MG/1
150 TABLET ORAL DAILY
COMMUNITY
Start: 2025-02-12

## 2025-04-29 RX ORDER — PREGABALIN 150 MG/1
300 CAPSULE ORAL 2 TIMES DAILY
Qty: 360 CAPSULE | Refills: 0 | Status: SHIPPED | OUTPATIENT
Start: 2025-04-29 | End: 2025-07-28

## 2025-04-29 SDOH — ECONOMIC STABILITY: FOOD INSECURITY: WITHIN THE PAST 12 MONTHS, THE FOOD YOU BOUGHT JUST DIDN'T LAST AND YOU DIDN'T HAVE MONEY TO GET MORE.: NEVER TRUE

## 2025-04-29 SDOH — ECONOMIC STABILITY: FOOD INSECURITY: WITHIN THE PAST 12 MONTHS, YOU WORRIED THAT YOUR FOOD WOULD RUN OUT BEFORE YOU GOT MONEY TO BUY MORE.: NEVER TRUE

## 2025-04-29 SDOH — ECONOMIC STABILITY: INCOME INSECURITY: IN THE LAST 12 MONTHS, WAS THERE A TIME WHEN YOU WERE NOT ABLE TO PAY THE MORTGAGE OR RENT ON TIME?: NO

## 2025-04-29 ASSESSMENT — ENCOUNTER SYMPTOMS
VOICE CHANGE: 0
EYE PAIN: 0
COLOR CHANGE: 0
CONSTIPATION: 0
BLOOD IN STOOL: 0
PHOTOPHOBIA: 0
ABDOMINAL PAIN: 0
NAUSEA: 0

## 2025-04-29 ASSESSMENT — PATIENT HEALTH QUESTIONNAIRE - PHQ9
SUM OF ALL RESPONSES TO PHQ QUESTIONS 1-9: 2
1. LITTLE INTEREST OR PLEASURE IN DOING THINGS: SEVERAL DAYS
2. FEELING DOWN, DEPRESSED OR HOPELESS: SEVERAL DAYS
SUM OF ALL RESPONSES TO PHQ9 QUESTIONS 1 & 2: 2
2. FEELING DOWN, DEPRESSED OR HOPELESS: SEVERAL DAYS
1. LITTLE INTEREST OR PLEASURE IN DOING THINGS: SEVERAL DAYS
SUM OF ALL RESPONSES TO PHQ QUESTIONS 1-9: 2

## 2025-04-29 NOTE — PROGRESS NOTES
[facility]  [address]  [address]  [place] [address]  Dept: [phone]  Dept Fax: [phone]  Loc: [phone]     2025    Visit type: Office visit    The patient (or guardian, if applicable) and other individuals in attendance with the patient were advised that Artificial Intelligence will be utilized during this visit to record, process the conversation to generate a clinical note, and support improvement of the AI technology. The patient (or guardian, if applicable) and other individuals in attendance at the appointment consented to the use of AI, including the recording.      Reason for Visit: Results       ASSESSMENT/PLAN     Assessment & Plan  1. Right ankle arthritis.  - Presents with a deformity in the right ankle, likely due to a previous sprain or arthritis. There is also evidence of blood pooling in the leg, contributing to redness and dryness.  - Physical exam reveals a swollen ankle with signs of arthritis. No recent x-rays available.  - Discussed the importance of foot hygiene due to diabetic condition to prevent infections. Recommended wearing compression stockings during the day and removing them at night.  - Ordered an x-ray of the right ankle for further evaluation. Referred to a podiatrist for specialized care. Advised to use diabetic shoes for additional support.    2. Diabetes mellitus.  - A1c levels have increased from 7.1 to 7.4, and blood glucose levels have risen from 114 to 167.  - Blood work shows stable kidney function and improved PSA levels.  - Reviewed the need to adjust insulin dosage to 32 units at night and 32 units in the morning. Suggested increasing mealtime insulin dosage based on dietary intake.  - Encouraged to maintain a healthy diet and ensure adequate hydration.    3. Loop recorder removal.  - Has an  loop recorder that needs to be removed.  - Advised to consult with cardiologist Dr. May regarding

## 2025-04-30 ENCOUNTER — RESULTS FOLLOW-UP (OUTPATIENT)
Dept: FAMILY MEDICINE CLINIC | Age: 75
End: 2025-04-30

## 2025-06-25 DIAGNOSIS — E10.40 TYPE 1 DIABETES MELLITUS WITH DIABETIC NEUROPATHY, UNSPECIFIED (HCC): ICD-10-CM

## 2025-06-25 DIAGNOSIS — E10.9 TYPE 1 DIABETES MELLITUS ON INSULIN THERAPY (HCC): ICD-10-CM

## 2025-06-25 RX ORDER — ACYCLOVIR 800 MG/1
1 TABLET ORAL
Qty: 2 EACH | Refills: 5 | Status: SHIPPED | OUTPATIENT
Start: 2025-06-25

## 2025-06-25 NOTE — TELEPHONE ENCOUNTER
Comments:     Last Office Visit (last PCP visit):   2025    Next Visit Date:  Future Appointments   Date Time Provider Department Center   2025 10:30 AM Jd Valle, ELIOTM [facility]       **If hasn't been seen in over a year OR hasn't followed up according to last diabetes/ADHD visit, make appointment for patient before sending refill to provider.    Rx requested:  Requested Prescriptions     Pending Prescriptions Disp Refills    Continuous Glucose Sensor (FREESTYLE NUBIA 3 SENSOR) MISC 2 each 5     Si each by Does not apply route every 14 days

## 2025-06-26 ENCOUNTER — TELEPHONE (OUTPATIENT)
Dept: FAMILY MEDICINE CLINIC | Age: 75
End: 2025-06-26

## 2025-06-26 NOTE — TELEPHONE ENCOUNTER
Irma Saldana calling requesting a call back on medication for   insulin NPH (NOVOLIN N) 100 UNIT/ML injection vial     Is this prescription for vial or ML .    Please advise .

## 2025-06-30 ENCOUNTER — TELEPHONE (OUTPATIENT)
Dept: PHARMACY | Facility: CLINIC | Age: 75
End: 2025-06-30

## 2025-06-30 NOTE — TELEPHONE ENCOUNTER
Attempting to reach patient to review - unable to leave message., MyChart message sent to patient., and Letter sent to patient.    Patient OVERDUE refilling Irbesartan and active on home medication list.   RX # [id number] has refills available and can be available by 7/1/25 4pm       Vinod AbramsD., [facility]  Population Health Pharmacist  [facility] Clinical Pharmacy  Department, toll free: [phone], option 1

## 2025-06-30 NOTE — TELEPHONE ENCOUNTER
[facility] CLINICAL PHARMACY: ADHERENCE REVIEW  Identified care gap per [employer]B [facility]    Patient also appears to be prescribed: Statin        ASSESSMENT    ACE/ARB ADHERENCE:    Insurance Records claims through  25 (Prior Year PDC = N/A;  YTD PDC = 68.18%; Potential Fail Date: 2025):     Irbesartan 150mg last filled on 2025 for 90 day supply. Anticipated next refill due: 2025  Fill History:   300m24, 24, 24 (90ds each)  150mg: 10/20/24, 25 (90ds each) - per Med Rec Dispense Report and payer data  Prescribed sig:  Take 1 (one) (whole) tablet by mouth once daily  Last ordered on: historical  Last Prescriber on Rx: Karla Asif    Per 25 cardiology note: \"The patient reports his irbesartan was reduced from 300 mg to 150 mg daily.\"  Per 2024 PCP Note: \"Will reduce Avapro to 150mg daily start HCTZ 12.5 recheck labs in 3 weeks pt with FU with new PCP at that time.\"    Per Care Everywhere 2025, 3/28/2025 Encounter:  irbesartan (Avapro) 150 mg tablet   Indications: Primary hypertension Take 1 tablet (150 mg) by mouth once daily. 90 tablet  3   2025           STATIN ADHERENCE:   (not currently a targeted med, but active in EMR)    Rosuvastatin 20mg last filled on 2025 for 90 day supply. Next refill due: 2025  Fill History: 24, 5/10/24, 24, 24, 25, 25 (90ds each) - per Med Rec Dispense Report and payer data  Prescribed sig:  Take 1 (one) (whole) tablet by mouth once every evening  Last ordered on: historical  Last Prescriber on Rx: Karla Asif          PLAN  The following are interventions that have been identified:   Per insurer report, LIS-0 - co-pays are based on tiers and patient is subject to coverage gap.  Patient OVERDUE refilling Irbesartan and active on home medication list.   RX # [id number] has refills available and can be available by 25 4pm        Last Visit:

## 2025-07-16 ENCOUNTER — INITIAL CONSULT (OUTPATIENT)
Age: 75
End: 2025-07-16
Payer: MEDICARE

## 2025-07-16 VITALS — WEIGHT: 242 LBS | HEIGHT: 72 IN | TEMPERATURE: 97.7 F | BODY MASS INDEX: 32.78 KG/M2

## 2025-07-16 DIAGNOSIS — M21.622 TAILOR'S BUNIONETTE, BILATERAL: ICD-10-CM

## 2025-07-16 DIAGNOSIS — M21.071 ACQUIRED VALGUS DEFORMITY OF RIGHT ANKLE: ICD-10-CM

## 2025-07-16 DIAGNOSIS — B35.1 DERMATOPHYTOSIS OF NAIL: ICD-10-CM

## 2025-07-16 DIAGNOSIS — M79.671 BILATERAL FOOT PAIN: Primary | ICD-10-CM

## 2025-07-16 DIAGNOSIS — M21.621 TAILOR'S BUNIONETTE, BILATERAL: ICD-10-CM

## 2025-07-16 DIAGNOSIS — G89.29 CHRONIC PAIN OF RIGHT ANKLE: ICD-10-CM

## 2025-07-16 DIAGNOSIS — M79.672 BILATERAL FOOT PAIN: Primary | ICD-10-CM

## 2025-07-16 DIAGNOSIS — E11.42 DIABETIC POLYNEUROPATHY ASSOCIATED WITH TYPE 2 DIABETES MELLITUS (HCC): ICD-10-CM

## 2025-07-16 DIAGNOSIS — M21.372 FOOT DROP, LEFT: ICD-10-CM

## 2025-07-16 DIAGNOSIS — M12.571 TRAUMATIC ARTHRITIS OF RIGHT ANKLE: ICD-10-CM

## 2025-07-16 DIAGNOSIS — M25.571 CHRONIC PAIN OF RIGHT ANKLE: ICD-10-CM

## 2025-07-16 PROCEDURE — 1159F MED LIST DOCD IN RCRD: CPT | Performed by: PODIATRIST

## 2025-07-16 PROCEDURE — 1160F RVW MEDS BY RX/DR IN RCRD: CPT | Performed by: PODIATRIST

## 2025-07-16 PROCEDURE — 3051F HG A1C>EQUAL 7.0%<8.0%: CPT | Performed by: PODIATRIST

## 2025-07-16 PROCEDURE — 1123F ACP DISCUSS/DSCN MKR DOCD: CPT | Performed by: PODIATRIST

## 2025-07-16 PROCEDURE — 99204 OFFICE O/P NEW MOD 45 MIN: CPT | Performed by: PODIATRIST

## 2025-07-16 PROCEDURE — 1126F AMNT PAIN NOTED NONE PRSNT: CPT | Performed by: PODIATRIST

## 2025-07-16 ASSESSMENT — ENCOUNTER SYMPTOMS
SHORTNESS OF BREATH: 0
BACK PAIN: 1
NAUSEA: 0
VOMITING: 0

## 2025-07-16 NOTE — PROGRESS NOTES
[facility] [place] [facility], [facility] PODIATRY  [address] [id number]  Dept: [phone]  Loc: [phone]       Rubin Jefferson  (1950)    7/16/25    Subjective     Rubin Jefferson is 75 y.o. male who complains today of:    Chief Complaint   Patient presents with    Diabetes    Ankle Pain     Right       Diabetes  Pertinent negatives for diabetes include no chest pain.   Ankle Pain   Associated symptoms include numbness.       History of Present Illness  The patient presents for evaluation of chronic ankle pain.  Patient was referred by Fiona Pearce MD.    He sustained an injury to his ankle approximately 50 years ago, which has since been weakened by subsequent injuries. Despite the injury, he retains some mobility in the ankle. He has not used a custom-made brace or diabetic shoes before.  The pain is manageable, and he continues to walk on it, although with caution to avoid stepping on uneven surfaces like nino or tree roots. He is unable to move his foot in a circular motion due to nerve and spine damage. He has been living with this condition for 50 years and is less concerned about it than his family doctor. He has never heard of Charcot foot before. He maintains good footwear hygiene and trims his own nails. He does not typically use a cane for mobility.  Patient has had weakness of the left lower extremity since surgery.  He has not had a carbon fiber brace for foot drop but was provided with a plastic device which sounds like a [place] AFO by his description during therapy 5 years ago, which he found uncomfortable.    He has a history of frequent cellulitis in his leg, often resulting from minor injuries sustained at work as a . He has been managing this with compression socks and regular use of chlorhexidine surgical soap during showers. He has been free from cellulitis for the past 2

## 2025-07-21 RX ORDER — DULOXETIN HYDROCHLORIDE 60 MG/1
60 CAPSULE, DELAYED RELEASE ORAL DAILY
Qty: 90 CAPSULE | Refills: 0 | Status: SHIPPED | OUTPATIENT
Start: 2025-07-21

## 2025-07-27 DIAGNOSIS — M15.0 PRIMARY OSTEOARTHRITIS INVOLVING MULTIPLE JOINTS: ICD-10-CM

## 2025-07-29 NOTE — TELEPHONE ENCOUNTER
Comments:     Last Office Visit (last PCP visit):   4/29/2025    Next Visit Date:  Future Appointments   Date Time Provider Department Center   10/17/2025  9:45 AM Jd Valle DPM [facility]       **If hasn't been seen in over a year OR hasn't followed up according to last diabetes/ADHD visit, make appointment for patient before sending refill to provider.    Rx requested:  Requested Prescriptions     Pending Prescriptions Disp Refills    diclofenac (VOLTAREN) 50 MG EC tablet [Pharmacy Med Name: Diclofenac Sodium 50 MG Oral Tablet Delayed Release] 270 tablet 0     Sig: TAKE 1 TABLET BY MOUTH THREE TIMES DAILY

## 2025-08-22 DIAGNOSIS — G82.20 CHRONIC PROGRESSIVE PARAPARESIS (HCC): ICD-10-CM

## 2025-08-22 RX ORDER — PREGABALIN 150 MG/1
300 CAPSULE ORAL 2 TIMES DAILY
Qty: 360 CAPSULE | Refills: 0 | Status: SHIPPED | OUTPATIENT
Start: 2025-08-22 | End: 2025-11-20

## 2025-08-27 ENCOUNTER — HOSPITAL ENCOUNTER (OUTPATIENT)
Dept: CARDIOLOGY | Facility: HOSPITAL | Age: 75
Discharge: HOME | End: 2025-08-27
Payer: MEDICARE

## 2025-08-27 ENCOUNTER — APPOINTMENT (OUTPATIENT)
Dept: CARDIOLOGY | Facility: CLINIC | Age: 75
End: 2025-08-27
Payer: MEDICARE

## 2025-08-27 VITALS
SYSTOLIC BLOOD PRESSURE: 120 MMHG | HEIGHT: 70 IN | DIASTOLIC BLOOD PRESSURE: 62 MMHG | BODY MASS INDEX: 33.36 KG/M2 | WEIGHT: 233 LBS | HEART RATE: 57 BPM

## 2025-08-27 DIAGNOSIS — Z45.09 ENCOUNTER FOR LOOP RECORDER AT END OF BATTERY LIFE: Primary | ICD-10-CM

## 2025-08-27 DIAGNOSIS — I48.0 PAF (PAROXYSMAL ATRIAL FIBRILLATION) (MULTI): ICD-10-CM

## 2025-08-27 DIAGNOSIS — Z95.818 STATUS POST PLACEMENT OF IMPLANTABLE LOOP RECORDER: ICD-10-CM

## 2025-08-27 DIAGNOSIS — I45.2 BIFASCICULAR BLOCK: ICD-10-CM

## 2025-08-27 DIAGNOSIS — I25.10 MILD CAD: ICD-10-CM

## 2025-08-27 PROCEDURE — 99213 OFFICE O/P EST LOW 20 MIN: CPT | Performed by: PHYSICIAN ASSISTANT

## 2025-08-27 PROCEDURE — 3074F SYST BP LT 130 MM HG: CPT | Performed by: PHYSICIAN ASSISTANT

## 2025-08-27 PROCEDURE — 93000 ELECTROCARDIOGRAM COMPLETE: CPT | Performed by: INTERNAL MEDICINE

## 2025-08-27 PROCEDURE — 1036F TOBACCO NON-USER: CPT | Performed by: PHYSICIAN ASSISTANT

## 2025-08-27 PROCEDURE — 3078F DIAST BP <80 MM HG: CPT | Performed by: PHYSICIAN ASSISTANT

## 2026-02-04 ENCOUNTER — APPOINTMENT (OUTPATIENT)
Dept: CARDIOLOGY | Facility: CLINIC | Age: 76
End: 2026-02-04
Payer: MEDICARE

## (undated) DEVICE — Z DISCONTINUED APPLICATOR SURG PREP 0.35OZ 2% CHG 70% ISO ALC W/ HI LT

## (undated) DEVICE — 3.0MM PRECISION NEURO (MATCH HEAD)

## (undated) DEVICE — SHEET,DRAPE,53X77,STERILE: Brand: MEDLINE

## (undated) DEVICE — DRAPE, C-ARM, BANDS, 41X120: Brand: MEDLINE

## (undated) DEVICE — INTENDED FOR TISSUE SEPARATION, AND OTHER PROCEDURES THAT REQUIRE A SHARP SURGICAL BLADE TO PUNCTURE OR CUT.: Brand: BARD-PARKER ® CARBON RIB-BACK BLADES

## (undated) DEVICE — COUNTER NDL 40 COUNT HLD 70 FOAM BLK ADH W/ MAG

## (undated) DEVICE — CODMAN® SURGICAL PATTIES 1/2" X 3" (1.27CM X 7.62CM): Brand: CODMAN®

## (undated) DEVICE — DRAPE EQUIP TRNSPRT CONTAINMENT FOR BK TAB

## (undated) DEVICE — TUBING, SUCTION, 9/32" X 20', STRAIGHT: Brand: MEDLINE INDUSTRIES, INC.

## (undated) DEVICE — SUTURE VCRL SZ 2-0 L27IN ABSRB UD L36MM CP-1 1/2 CIR REV J266H

## (undated) DEVICE — PENCIL SMK EVAC 10 FT BLADE ELECTRD ROCKER FOR TELSCP

## (undated) DEVICE — 1010 S-DRAPE TOWEL DRAPE 10/BX: Brand: STERI-DRAPE™

## (undated) DEVICE — DRAIN SURG 10FR 100% SIL RND END PERF W/ TRCR

## (undated) DEVICE — PACK,LAPAROTOMY,NO GOWNS: Brand: MEDLINE

## (undated) DEVICE — 3M™ STERI-DRAPE™ INCISE DRAPE 1050 (60CM X 45CM): Brand: STERI-DRAPE™

## (undated) DEVICE — CORD,CAUTERY,BIPOLAR,STERILE: Brand: MEDLINE

## (undated) DEVICE — CHLORAPREP 26ML ORANGE

## (undated) DEVICE — GAUZE,SPONGE,4"X4",16PLY,XRAY,STRL,LF: Brand: MEDLINE

## (undated) DEVICE — SYRINGE MED 30ML STD CLR PLAS LUERLOCK TIP N CTRL DISP

## (undated) DEVICE — GAUZE,SPONGE,2"X2",8PLY,STERILE,LF,2'S: Brand: MEDLINE

## (undated) DEVICE — CODMAN® SURGICAL PATTIES 1/2" X 1/2" (1.27CM X 1.27CM): Brand: CODMAN®

## (undated) DEVICE — SUTURE VCRL SZ 0 L27IN ABSRB UD L26MM CP-2 1/2 CIR SGL J870H

## (undated) DEVICE — TOWEL,OR,DSP,ST,BLUE,STD,4/PK,20PK/CS: Brand: MEDLINE

## (undated) DEVICE — SUTURE VCRL SZ 4-0 L18IN ABSRB UD L19MM PS-2 3/8 CIR PRIM J496H

## (undated) DEVICE — ALCON SURGICAL BLADE 64: Brand: ALCON

## (undated) DEVICE — KIT EVAC 400CC PVC RADPQ Y CONN

## (undated) DEVICE — 3M™ TEGADERM™ TRANSPARENT FILM DRESSING FRAME STYLE, 1624W, 2-3/8 IN X 2-3/4 IN (6 CM X 7 CM), 100/CT 4CT/CASE: Brand: 3M™ TEGADERM™

## (undated) DEVICE — ELECTRODE PT RET AD L9FT HI MOIST COND ADH HYDRGEL CORDED

## (undated) DEVICE — 3M™ STERI-DRAPE™ INSTRUMENT POUCH 1018: Brand: STERI-DRAPE™

## (undated) DEVICE — GOWN,AURORA,NONREINFORCED,LARGE: Brand: MEDLINE

## (undated) DEVICE — GLOVE ORANGE PI 8   MSG9080

## (undated) DEVICE — GLOVE ORANGE PI 7 1/2   MSG9075

## (undated) DEVICE — SYRINGE IRRIG 60ML SFT PLIABLE BLB EZ TO GRP 1 HND USE W/

## (undated) DEVICE — COVER MICSCP W46XL120IN 4 BINOC GLS LENS LEICA

## (undated) DEVICE — SYRINGE MED 10ML LUERLOCK TIP W/O SFTY DISP

## (undated) DEVICE — CATHETER IV 16 GAX2 IN STR INTROCAN SAFETY

## (undated) DEVICE — HYPODERMIC SAFETY NEEDLE: Brand: MAGELLAN

## (undated) DEVICE — WAX SURG 2.5GM HEMSTAT BNE BEESWAX PARAFFIN ISO PALMITATE

## (undated) DEVICE — GLOVE ORANGE PI 8 1/2   MSG9085

## (undated) DEVICE — SUTURE VCRL + SZ 3-0 L18IN ABSRB UD PS-2 3/8 CIR REV CUT VCP497H

## (undated) DEVICE — NEEDLE SPINAL 22GA L3.5IN SPINOCAN

## (undated) DEVICE — MARKER SURG SKIN GENTIAN VLT REG TIP W/ 6IN RUL

## (undated) DEVICE — LABEL MED MINI W/ MARKER

## (undated) DEVICE — 4-PORT MANIFOLD: Brand: NEPTUNE 2